# Patient Record
Sex: FEMALE | Race: BLACK OR AFRICAN AMERICAN | NOT HISPANIC OR LATINO | Employment: FULL TIME | URBAN - METROPOLITAN AREA
[De-identification: names, ages, dates, MRNs, and addresses within clinical notes are randomized per-mention and may not be internally consistent; named-entity substitution may affect disease eponyms.]

---

## 2017-09-11 ENCOUNTER — OFFICE VISIT (OUTPATIENT)
Dept: MEDICAL GROUP | Facility: PHYSICIAN GROUP | Age: 27
End: 2017-09-11
Payer: OTHER GOVERNMENT

## 2017-09-11 VITALS
BODY MASS INDEX: 21.71 KG/M2 | HEIGHT: 62 IN | HEART RATE: 74 BPM | SYSTOLIC BLOOD PRESSURE: 100 MMHG | DIASTOLIC BLOOD PRESSURE: 62 MMHG | OXYGEN SATURATION: 100 % | TEMPERATURE: 97.7 F | WEIGHT: 118 LBS

## 2017-09-11 DIAGNOSIS — G43.009 MIGRAINE WITHOUT AURA AND WITHOUT STATUS MIGRAINOSUS, NOT INTRACTABLE: ICD-10-CM

## 2017-09-11 DIAGNOSIS — G47.00 INSOMNIA, UNSPECIFIED TYPE: ICD-10-CM

## 2017-09-11 DIAGNOSIS — R11.0 NAUSEA: ICD-10-CM

## 2017-09-11 DIAGNOSIS — N64.4 PAIN OF LEFT BREAST: ICD-10-CM

## 2017-09-11 DIAGNOSIS — Z00.00 HEALTHCARE MAINTENANCE: ICD-10-CM

## 2017-09-11 DIAGNOSIS — F41.9 ANXIETY AND DEPRESSION: ICD-10-CM

## 2017-09-11 DIAGNOSIS — F32.A ANXIETY AND DEPRESSION: ICD-10-CM

## 2017-09-11 LAB
INT CON NEG: NEGATIVE
INT CON POS: POSITIVE
POC URINE PREGNANCY TEST: NEGATIVE

## 2017-09-11 PROCEDURE — 99204 OFFICE O/P NEW MOD 45 MIN: CPT | Performed by: FAMILY MEDICINE

## 2017-09-11 PROCEDURE — 81025 URINE PREGNANCY TEST: CPT | Performed by: FAMILY MEDICINE

## 2017-09-11 RX ORDER — TRAZODONE HYDROCHLORIDE 50 MG/1
50 TABLET ORAL NIGHTLY
COMMUNITY
End: 2018-01-03

## 2017-09-11 RX ORDER — LOTEPREDNOL ETABONATE 5 MG/ML
SUSPENSION/ DROPS OPHTHALMIC 4 TIMES DAILY
COMMUNITY
End: 2018-06-13

## 2017-09-11 RX ORDER — M-VIT,TX,IRON,MINS/CALC/FOLIC 27MG-0.4MG
1 TABLET ORAL DAILY
COMMUNITY
End: 2017-10-30

## 2017-09-11 RX ORDER — HYDROXYZINE 50 MG/1
50 TABLET, FILM COATED ORAL 3 TIMES DAILY PRN
Qty: 30 TAB | Refills: 0 | Status: SHIPPED | OUTPATIENT
Start: 2017-09-11 | End: 2018-06-13

## 2017-09-11 RX ORDER — ETONOGESTREL AND ETHINYL ESTRADIOL VAGINAL RING .015; .12 MG/D; MG/D
1 RING VAGINAL
COMMUNITY
End: 2017-11-29 | Stop reason: SDUPTHER

## 2017-09-11 RX ORDER — IBUPROFEN 600 MG/1
600 TABLET ORAL EVERY 6 HOURS PRN
COMMUNITY
End: 2017-09-11

## 2017-09-11 RX ORDER — SERTRALINE HYDROCHLORIDE 100 MG/1
100 TABLET, FILM COATED ORAL DAILY
COMMUNITY
End: 2018-01-03

## 2017-09-11 ASSESSMENT — PAIN SCALES - GENERAL: PAINLEVEL: NO PAIN

## 2017-09-11 ASSESSMENT — PATIENT HEALTH QUESTIONNAIRE - PHQ9: CLINICAL INTERPRETATION OF PHQ2 SCORE: 0

## 2017-09-12 NOTE — ASSESSMENT & PLAN NOTE
Patient has hx of migraine headaches generally well controlled.Not taking any medication.Infrequent headaches managed by ibuprofen or tylenol.

## 2017-09-12 NOTE — ASSESSMENT & PLAN NOTE
Patient has been experiencing nausea for the last few weeks.Deneis vomiting.She has been on Nuvaring, however missed about 4 weeks as it was not delivered on time.She did have a pregnancy test 1 week back which was negative.

## 2017-09-12 NOTE — ASSESSMENT & PLAN NOTE
Was diagnosed with anxiety and depression.Takes Zoloft 100 mg daily which seems to help somewhat.She still gets anxiety attacks where the first thing happens she starts breathing heavily.She tries to manage it by trying to relax herself but does not take any prn medication.Often times she has resorted to alcohol as well to calm herself down.

## 2017-09-12 NOTE — ASSESSMENT & PLAN NOTE
She is experiencing left breast pain for the last 6 months.She was seen by previous PCP and was reassured and advised to return for follow up breast exam but she never went back.Pain is sharp, comes in episodes but happening often.No lumps.She does not drink coffee.Has also changed bras but has not helped.Taken ibuprofen which seems to help temporarily.Deneis chest tightness, SOB.

## 2017-09-12 NOTE — ASSESSMENT & PLAN NOTE
Patient states she was diagnosed with chronic insomnia.She takes trazodone as needed to help with sleep.She tries to limit it's use.

## 2017-09-12 NOTE — PROGRESS NOTES
Abby LLANES is a 26 y.o. female here for establishing care, complaining of left breast pain.  Patient recently moved to the area from [place].    HPI:      Migraine without aura and without status migrainosus, not intractable  Patient has hx of migraine headaches generally well controlled.Not taking any medication.Infrequent headaches managed by ibuprofen or tylenol.    Anxiety and depression  Was diagnosed with anxiety and depression.Takes Zoloft 100 mg daily which seems to help somewhat.She still gets anxiety attacks where the first thing happens she starts breathing heavily.She tries to manage it by trying to relax herself but does not take any prn medication.Often times she has resorted to alcohol as well to calm herself down.    Insomnia disorder  Patient states she was diagnosed with chronic insomnia.She takes trazodone as needed to help with sleep.She tries to limit it's use.    Nausea  Patient has been experiencing nausea for the last few weeks.Deneis vomiting.She has been on Nuvaring, however missed about 4 weeks as it was not delivered on time.She did have a pregnancy test 1 week back which was negative.    Pain of left breast  She is experiencing left breast pain for the last 6 months.She was seen by previous PCP and was reassured and advised to return for follow up breast exam but she never went back.Pain is sharp, comes in episodes but happening often.No lumps.She does not drink coffee.Has also changed bras but has not helped.Taken ibuprofen which seems to help temporarily.Deneis chest tightness, SOB.    Current medicines (including changes today)  Current Outpatient Prescriptions   Medication Sig Dispense Refill   • sertraline (ZOLOFT) 100 MG Tab Take 100 mg by mouth every day.     • loteprednol etabonate (LOTEMAX) 0.5 % ophthalmic suspension Place  in both eyes 4 times a day.     • trazodone (DESYREL) 50 MG Tab Take 50 mg by mouth every evening.     • ethinyl estradiol-etonogestrel (NUVARING)  0.12-0.015 MG/24HR vaginal ring Insert 1 Each in vagina.     • therapeutic multivitamin-minerals (THERAGRAN-M) Tab Take 1 Tab by mouth every day.     • hydrOXYzine (ATARAX) 50 MG Tab Take 1 Tab by mouth 3 times a day as needed for Itching or Anxiety. 30 Tab 0     No current facility-administered medications for this visit.      She  has no past medical history on file.  She  has no past surgical history on file.  Social History   Substance Use Topics   • Smoking status: Never Smoker   • Smokeless tobacco: Never Used   • Alcohol use Yes      Comment: on the weekends     Social History     Social History Narrative   • No narrative on file     Family History   Problem Relation Age of Onset   • Cancer Maternal Aunt      Breast cancer   • Cancer Maternal Grandmother      Breast cancer     Family Status   Relation Status   • Mother Alive   • Father Alive   • Maternal Aunt Alive   • Maternal Grandmother Alive         ROS  Denies fever, chills, sweats, recent weight change  Skin: negative for rash, scaling, itching, pigmentation, hair or nail changes.  Eyes: negative for visual blurring, double vision, eye pain, floaters and discharge from eyes  Ears/Nose/Throat: negative for tinnitus, vertigo, bleeding gums, dental problem and hoarseness, frequent URI's, sinus trouble, persistent sore throat  Respiratory: negative for persistent cough, hemoptysis, dyspnea  Cardiovascular: negative for palpitations, irregular heart beat, chest pain, or peripheral edema.  Gastrointestinal: negative for poor appetite, dysphagia, nausea, heartburn or reflux, abdominal pain, constipation or diarrhea  Genitourinary: negative for nocturia, dysuria, frequency, hesitancy, incontinence  Musculoskeletal: negative for joint pain/swelling and muscle pain/ soreness  Neurologic: negative for headaches, involuntary movements or tremor,muscle weakness  Hematologic/Lymphatic/Immunologic: negative for anemia, unusual bruising, swollen glands  Endocrine:  "negative for temperature intolerance, polydipsia, polyuria, unintentional weight changes.          Objective:     Blood pressure 100/62, pulse 74, temperature 36.5 °C (97.7 °F), height 1.575 m (5' 2\"), weight 53.5 kg (118 lb), SpO2 100 %. Body mass index is 21.58 kg/m².  Physical Exam:    GEN: Alert and oriented,well appearing, no acute distress  SKIN: warm, dry to touch, no rashes or lesions in visible areas  PSYCH: mood and affect normal, judgement normal  EYES: Conjunctiva clear, lids normal,pupils equal round and reactive  ENMT: Normal external nose and ears,EACs normal appearing, TM pearly gray with normal light reflex bilaterally,nasal mucosa and turbinates normal appearing without erythema or edema, lips without lesions,good dentition,oropharynx clear  Neck : Trachea midline, no masses or swelling, no thyromegaly  LYMPHATIC : No cervical or supraclavicular lymphadenopathy  RESPIRATORY : Unlabored respiratory effort, no distress noted, clear to auscultation bilaterally, no wheeze, rhonchi, crackles  CARDIOVASCULAR: RRR, S1 S2 normal, no murmurs   Breast exam : Lumpy glandular tissue bilateral, no well defined lumps palpated, no nipple discharge, no induration, no tenderness  MUSCULOSKELETAL : Normal gait and stance, no obvious abnormalities   NEURO: No overt focal neurologic deficits,sensation intact      Assessment and Plan:   The following treatment plan was discussed    1. Migraine without aura and without status migrainosus, not intractable  Controlled.COntinue to monitor.    2. Anxiety and depression  Uncontrolled.Continue Zoloft.  Prescribed atarax to use prn for anxiety attacks  - hydrOXYzine (ATARAX) 50 MG Tab; Take 1 Tab by mouth 3 times a day as needed for Itching or Anxiety.  Dispense: 30 Tab; Refill: 0    3. Insomnia, unspecified type  Controlled.Continue Trazodone prn.    4. Nausea  - POCT PREGNANCY  negative    5. Pain of left breast  New problem.Most likely fibrocystic breast disease.Breast exam " normal.However given duration of symptoms, ultrasound ordered.  - US-BREAST LIMITED-LEFT; Future    6. Healthcare maintenance  - CBC WITH DIFFERENTIAL; Future  - COMP METABOLIC PANEL; Future  - TSH WITH REFLEX TO FT4; Future  - VITAMIN D,25 HYDROXY; Future      Followup: Return in about 3 months (around 12/11/2017).         Please note that this dictation was created using voice recognition software. I have made every reasonable attempt to correct obvious errors, but I expect that there are errors of grammar and possibly content that I did not discover before finalizing the note.

## 2017-09-29 ENCOUNTER — HOSPITAL ENCOUNTER (OUTPATIENT)
Dept: LAB | Facility: MEDICAL CENTER | Age: 27
End: 2017-09-29
Attending: FAMILY MEDICINE
Payer: OTHER GOVERNMENT

## 2017-09-29 ENCOUNTER — HOSPITAL ENCOUNTER (OUTPATIENT)
Dept: RADIOLOGY | Facility: MEDICAL CENTER | Age: 27
End: 2017-09-29
Attending: FAMILY MEDICINE
Payer: OTHER GOVERNMENT

## 2017-09-29 DIAGNOSIS — N64.4 BREAST PAIN: ICD-10-CM

## 2017-09-29 DIAGNOSIS — Z00.00 HEALTHCARE MAINTENANCE: ICD-10-CM

## 2017-09-29 DIAGNOSIS — N64.4 PAIN OF LEFT BREAST: ICD-10-CM

## 2017-09-29 LAB
25(OH)D3 SERPL-MCNC: 13 NG/ML (ref 30–100)
ALBUMIN SERPL BCP-MCNC: 3.9 G/DL (ref 3.2–4.9)
ALBUMIN/GLOB SERPL: 1.1 G/DL
ALP SERPL-CCNC: 35 U/L (ref 30–99)
ALT SERPL-CCNC: 13 U/L (ref 2–50)
ANION GAP SERPL CALC-SCNC: 9 MMOL/L (ref 0–11.9)
AST SERPL-CCNC: 15 U/L (ref 12–45)
BASOPHILS # BLD AUTO: 0.2 % (ref 0–1.8)
BASOPHILS # BLD: 0.01 K/UL (ref 0–0.12)
BILIRUB SERPL-MCNC: 0.6 MG/DL (ref 0.1–1.5)
BUN SERPL-MCNC: 8 MG/DL (ref 8–22)
CALCIUM SERPL-MCNC: 9.4 MG/DL (ref 8.5–10.5)
CHLORIDE SERPL-SCNC: 106 MMOL/L (ref 96–112)
CO2 SERPL-SCNC: 24 MMOL/L (ref 20–33)
CREAT SERPL-MCNC: 0.69 MG/DL (ref 0.5–1.4)
EOSINOPHIL # BLD AUTO: 0.03 K/UL (ref 0–0.51)
EOSINOPHIL NFR BLD: 0.6 % (ref 0–6.9)
ERYTHROCYTE [DISTWIDTH] IN BLOOD BY AUTOMATED COUNT: 43.5 FL (ref 35.9–50)
GFR SERPL CREATININE-BSD FRML MDRD: >60 ML/MIN/1.73 M 2
GLOBULIN SER CALC-MCNC: 3.4 G/DL (ref 1.9–3.5)
GLUCOSE SERPL-MCNC: 71 MG/DL (ref 65–99)
HCT VFR BLD AUTO: 40.1 % (ref 37–47)
HGB BLD-MCNC: 13.5 G/DL (ref 12–16)
IMM GRANULOCYTES # BLD AUTO: 0.01 K/UL (ref 0–0.11)
IMM GRANULOCYTES NFR BLD AUTO: 0.2 % (ref 0–0.9)
LYMPHOCYTES # BLD AUTO: 3.21 K/UL (ref 1–4.8)
LYMPHOCYTES NFR BLD: 66.7 % (ref 22–41)
MCH RBC QN AUTO: 30.5 PG (ref 27–33)
MCHC RBC AUTO-ENTMCNC: 33.7 G/DL (ref 33.6–35)
MCV RBC AUTO: 90.7 FL (ref 81.4–97.8)
MONOCYTES # BLD AUTO: 0.41 K/UL (ref 0–0.85)
MONOCYTES NFR BLD AUTO: 8.5 % (ref 0–13.4)
NEUTROPHILS # BLD AUTO: 1.14 K/UL (ref 2–7.15)
NEUTROPHILS NFR BLD: 23.8 % (ref 44–72)
NRBC # BLD AUTO: 0 K/UL
NRBC BLD AUTO-RTO: 0 /100 WBC
PLATELET # BLD AUTO: 248 K/UL (ref 164–446)
PMV BLD AUTO: 10.3 FL (ref 9–12.9)
POTASSIUM SERPL-SCNC: 3.9 MMOL/L (ref 3.6–5.5)
PROT SERPL-MCNC: 7.3 G/DL (ref 6–8.2)
RBC # BLD AUTO: 4.42 M/UL (ref 4.2–5.4)
SODIUM SERPL-SCNC: 139 MMOL/L (ref 135–145)
TSH SERPL DL<=0.005 MIU/L-ACNC: 1.14 UIU/ML (ref 0.3–3.7)
WBC # BLD AUTO: 4.8 K/UL (ref 4.8–10.8)

## 2017-09-29 PROCEDURE — 82306 VITAMIN D 25 HYDROXY: CPT

## 2017-09-29 PROCEDURE — 85025 COMPLETE CBC W/AUTO DIFF WBC: CPT

## 2017-09-29 PROCEDURE — 36415 COLL VENOUS BLD VENIPUNCTURE: CPT

## 2017-09-29 PROCEDURE — 76642 ULTRASOUND BREAST LIMITED: CPT | Mod: LT

## 2017-09-29 PROCEDURE — 84443 ASSAY THYROID STIM HORMONE: CPT

## 2017-09-29 PROCEDURE — 80053 COMPREHEN METABOLIC PANEL: CPT

## 2017-10-03 DIAGNOSIS — D70.9 NEUTROPENIA, UNSPECIFIED TYPE (HCC): ICD-10-CM

## 2017-10-05 ENCOUNTER — TELEPHONE (OUTPATIENT)
Dept: MEDICAL GROUP | Facility: PHYSICIAN GROUP | Age: 27
End: 2017-10-05

## 2017-10-06 NOTE — TELEPHONE ENCOUNTER
----- Message from Albert Garcia M.D. sent at 10/3/2017  4:46 PM PDT -----  Please notify patient of her lab results :    No major abnormalities.    Vitamin D is low.Needs to take Vitamin D 5000 units daily.    One of her blood cell counts (neutrophils - that fight infections in our body) is mildly low.This may be transient and does not point to any specific abnormality or disease.We will however need to monitor it. There will recheck her blood counts in 4 weeks. I will place order and it should be done when she is in general good health (not during a illness like common cold, flu, fever etc.)    Albert Garcia M.D.

## 2017-10-30 ENCOUNTER — HOSPITAL ENCOUNTER (OUTPATIENT)
Facility: MEDICAL CENTER | Age: 27
End: 2017-10-30
Attending: FAMILY MEDICINE
Payer: OTHER GOVERNMENT

## 2017-10-30 ENCOUNTER — OFFICE VISIT (OUTPATIENT)
Dept: MEDICAL GROUP | Facility: PHYSICIAN GROUP | Age: 27
End: 2017-10-30
Payer: OTHER GOVERNMENT

## 2017-10-30 VITALS
BODY MASS INDEX: 22.26 KG/M2 | DIASTOLIC BLOOD PRESSURE: 68 MMHG | WEIGHT: 121 LBS | SYSTOLIC BLOOD PRESSURE: 96 MMHG | HEIGHT: 62 IN | TEMPERATURE: 97 F | HEART RATE: 72 BPM | OXYGEN SATURATION: 97 %

## 2017-10-30 DIAGNOSIS — R35.0 INCREASED URINARY FREQUENCY: ICD-10-CM

## 2017-10-30 DIAGNOSIS — Z23 NEED FOR VACCINATION: ICD-10-CM

## 2017-10-30 PROBLEM — N34.3 DYSURIA-FREQUENCY SYNDROME: Status: ACTIVE | Noted: 2017-10-30

## 2017-10-30 LAB
APPEARANCE UR: CLEAR
BILIRUB UR STRIP-MCNC: NORMAL MG/DL
CANDIDA DNA VAG QL PROBE+SIG AMP: NEGATIVE
COLOR UR AUTO: YELLOW
G VAGINALIS DNA VAG QL PROBE+SIG AMP: POSITIVE
GLUCOSE UR STRIP.AUTO-MCNC: NORMAL MG/DL
KETONES UR STRIP.AUTO-MCNC: NORMAL MG/DL
LEUKOCYTE ESTERASE UR QL STRIP.AUTO: NORMAL
NITRITE UR QL STRIP.AUTO: NORMAL
PH UR STRIP.AUTO: 7 [PH] (ref 5–8)
PROT UR QL STRIP: NORMAL MG/DL
RBC UR QL AUTO: NORMAL
SP GR UR STRIP.AUTO: 1.03
T VAGINALIS DNA VAG QL PROBE+SIG AMP: NEGATIVE
UROBILINOGEN UR STRIP-MCNC: NORMAL MG/DL

## 2017-10-30 PROCEDURE — 87591 N.GONORRHOEAE DNA AMP PROB: CPT

## 2017-10-30 PROCEDURE — 87660 TRICHOMONAS VAGIN DIR PROBE: CPT

## 2017-10-30 PROCEDURE — 81002 URINALYSIS NONAUTO W/O SCOPE: CPT | Performed by: FAMILY MEDICINE

## 2017-10-30 PROCEDURE — 87510 GARDNER VAG DNA DIR PROBE: CPT

## 2017-10-30 PROCEDURE — 87086 URINE CULTURE/COLONY COUNT: CPT

## 2017-10-30 PROCEDURE — 90471 IMMUNIZATION ADMIN: CPT | Performed by: FAMILY MEDICINE

## 2017-10-30 PROCEDURE — 90686 IIV4 VACC NO PRSV 0.5 ML IM: CPT | Performed by: FAMILY MEDICINE

## 2017-10-30 PROCEDURE — 99214 OFFICE O/P EST MOD 30 MIN: CPT | Performed by: FAMILY MEDICINE

## 2017-10-30 PROCEDURE — 87491 CHLMYD TRACH DNA AMP PROBE: CPT

## 2017-10-30 PROCEDURE — 87480 CANDIDA DNA DIR PROBE: CPT

## 2017-10-30 ASSESSMENT — PAIN SCALES - GENERAL: PAINLEVEL: 2=MINIMAL-SLIGHT

## 2017-10-30 NOTE — PROGRESS NOTES
"Subjective:   Abby LLANES is a 26 y.o. female here today for Urinary frequency     HPI :    New complaint  Urinary frequency X 1 week  She does have good urine flow, however there are times when she feels like she needs to urinate but does not have much urine volume production. During this times it also burns when she urinates. She also reports some lower abdominal pain. Denies nausea, vomiting, back pain, fever, chills. She also admits to not drinking water because she does not like the taste of it. Reports some increase in vaginal discharge, whitish in color, no itching. She is currently sexually active. Denies history of sexually-transmitted infections. She reports having had a Pap done about a year back when she was at [facility] which is normal. We do not have records. Not taking any over-the-counter medications.      Current medicines (including changes today)  Current Outpatient Prescriptions   Medication Sig Dispense Refill   • loteprednol etabonate (LOTEMAX) 0.5 % ophthalmic suspension Place  in both eyes 4 times a day.     • ethinyl estradiol-etonogestrel (NUVARING) 0.12-0.015 MG/24HR vaginal ring Insert 1 Each in vagina.     • hydrOXYzine (ATARAX) 50 MG Tab Take 1 Tab by mouth 3 times a day as needed for Itching or Anxiety. 30 Tab 0   • sertraline (ZOLOFT) 100 MG Tab Take 100 mg by mouth every day.     • trazodone (DESYREL) 50 MG Tab Take 50 mg by mouth every evening.       No current facility-administered medications for this visit.      She  has a past medical history of Anxiety and depression and Migraine.    ROS   No chest pain, no shortness of breath, no abdominal pain  See history of present illness     Objective:     Blood pressure (!) 96/68, pulse 72, temperature 36.1 °C (97 °F), height 1.575 m (5' 2\"), weight 54.9 kg (121 lb), SpO2 97 %. Body mass index is 22.13 kg/m².     PHYSICAL EXAM     GEN: Alert and oriented,well appearing, no acute distress  SKIN: warm, dry to touch, no rashes or " lesions in visible areas  PSYCH: mood and affect normal, judgement normal  RESPIRATORY : Unlabored respiratory effort, no distress noted, clear to auscultation bilaterally, no wheeze, rhonchi, crackles  CARDIOVASCULAR: RRR, S1 S2 normal, no murmurs, no edema of the extremities  GI: Soft, Non tender, No rebound or guarding, no hepatosplenomegaly,suprapubic tenderness +, no CVA tenderness   MUSCULOSKELETAL : Normal gait and stance, no obvious abnormalities   NEURO: No overt focal neurologic deficits,sensation intact        Assessment and Plan:   The following treatment plan was discussed    1. Increased urinary frequency  New problem  UA in clinic : leuks, nitrite, blood neg  Will send for Urine cx.Also collected wet prep to R/O vaginitis and GC/chlamydia  Advised to drink 8-10 glasses of water daily.  Will follow-up results.  - POCT Urinalysis  - URINE CULTURE(NEW); Future  - VAGINAL PATHOGENS DNA PANEL; Future  - CHLAMYDIA/GC PCR URINE OR SWAB; Future      Followup: Return if symptoms worsen or fail to improve.         Please note that this dictation was created using voice recognition software. I have made every reasonable attempt to correct obvious errors, but I expect that there are errors of grammar and possibly content that I did not discover before finalizing the note.

## 2017-10-31 LAB
C TRACH DNA SPEC QL NAA+PROBE: NEGATIVE
N GONORRHOEA DNA SPEC QL NAA+PROBE: NEGATIVE
SPECIMEN SOURCE: NORMAL

## 2017-11-01 LAB
BACTERIA UR CULT: ABNORMAL
SIGNIFICANT IND 70042: ABNORMAL
SITE SITE: ABNORMAL
SOURCE SOURCE: ABNORMAL

## 2017-11-03 ENCOUNTER — TELEPHONE (OUTPATIENT)
Dept: MEDICAL GROUP | Facility: PHYSICIAN GROUP | Age: 27
End: 2017-11-03

## 2017-11-05 RX ORDER — METRONIDAZOLE 500 MG/1
500 TABLET ORAL 2 TIMES DAILY
Qty: 14 TAB | Refills: 0 | Status: SHIPPED | OUTPATIENT
Start: 2017-11-05 | End: 2017-11-12

## 2017-11-29 ENCOUNTER — HOSPITAL ENCOUNTER (OUTPATIENT)
Dept: LAB | Facility: MEDICAL CENTER | Age: 27
End: 2017-11-29
Attending: NURSE PRACTITIONER
Payer: OTHER GOVERNMENT

## 2017-11-29 ENCOUNTER — OFFICE VISIT (OUTPATIENT)
Dept: MEDICAL GROUP | Facility: PHYSICIAN GROUP | Age: 27
End: 2017-11-29
Payer: OTHER GOVERNMENT

## 2017-11-29 VITALS
TEMPERATURE: 97.4 F | HEIGHT: 62 IN | BODY MASS INDEX: 22.08 KG/M2 | SYSTOLIC BLOOD PRESSURE: 108 MMHG | WEIGHT: 120 LBS | HEART RATE: 74 BPM | OXYGEN SATURATION: 96 % | DIASTOLIC BLOOD PRESSURE: 64 MMHG | RESPIRATION RATE: 12 BRPM

## 2017-11-29 DIAGNOSIS — R20.0 BILATERAL NUMBNESS AND TINGLING OF ARMS AND LEGS: ICD-10-CM

## 2017-11-29 DIAGNOSIS — R20.2 BILATERAL NUMBNESS AND TINGLING OF ARMS AND LEGS: ICD-10-CM

## 2017-11-29 DIAGNOSIS — M79.18 INTERCOSTAL MUSCLE PAIN: ICD-10-CM

## 2017-11-29 LAB
ERYTHROCYTE [SEDIMENTATION RATE] IN BLOOD BY WESTERGREN METHOD: 7 MM/HOUR (ref 0–20)
VIT B12 SERPL-MCNC: 333 PG/ML (ref 211–911)

## 2017-11-29 PROCEDURE — 82607 VITAMIN B-12: CPT

## 2017-11-29 PROCEDURE — 84425 ASSAY OF VITAMIN B-1: CPT

## 2017-11-29 PROCEDURE — 86038 ANTINUCLEAR ANTIBODIES: CPT

## 2017-11-29 PROCEDURE — 36415 COLL VENOUS BLD VENIPUNCTURE: CPT

## 2017-11-29 PROCEDURE — 85652 RBC SED RATE AUTOMATED: CPT

## 2017-11-29 PROCEDURE — 99214 OFFICE O/P EST MOD 30 MIN: CPT | Performed by: NURSE PRACTITIONER

## 2017-11-29 PROCEDURE — 86235 NUCLEAR ANTIGEN ANTIBODY: CPT

## 2017-11-29 PROCEDURE — 86225 DNA ANTIBODY NATIVE: CPT

## 2017-11-29 RX ORDER — NAPROXEN 375 MG/1
TABLET ORAL
Refills: 0 | COMMUNITY
Start: 2017-09-02 | End: 2017-11-29

## 2017-11-29 RX ORDER — ETONOGESTREL AND ETHINYL ESTRADIOL VAGINAL RING .015; .12 MG/D; MG/D
RING VAGINAL
Qty: 3 EACH | Refills: 3 | Status: SHIPPED | OUTPATIENT
Start: 2017-11-29 | End: 2018-06-13

## 2017-11-29 RX ORDER — TIZANIDINE 4 MG/1
4 TABLET ORAL EVERY 12 HOURS PRN
Qty: 30 TAB | Refills: 0 | Status: SHIPPED | OUTPATIENT
Start: 2017-11-29 | End: 2017-11-29 | Stop reason: SDUPTHER

## 2017-11-29 RX ORDER — ONDANSETRON 4 MG/1
TABLET, ORALLY DISINTEGRATING ORAL
Refills: 0 | COMMUNITY
Start: 2017-09-02 | End: 2017-11-29

## 2017-11-29 RX ORDER — METHOCARBAMOL 500 MG/1
500 TABLET, FILM COATED ORAL
COMMUNITY
Start: 2016-06-20 | End: 2017-11-29

## 2017-11-29 RX ORDER — NITROFURANTOIN 25; 75 MG/1; MG/1
CAPSULE ORAL
Refills: 0 | COMMUNITY
Start: 2017-09-02 | End: 2017-11-29

## 2017-11-29 RX ORDER — IBUPROFEN 600 MG/1
600 TABLET ORAL
COMMUNITY
Start: 2016-06-20 | End: 2017-11-29

## 2017-11-29 RX ORDER — FAMOTIDINE 20 MG/1
20 TABLET, FILM COATED ORAL 2 TIMES DAILY
Qty: 10 TAB | Refills: 0 | Status: SHIPPED | OUTPATIENT
Start: 2017-11-29 | End: 2018-06-13

## 2017-11-29 RX ORDER — TRAMADOL HYDROCHLORIDE 50 MG/1
50 TABLET ORAL
COMMUNITY
Start: 2016-06-20 | End: 2018-01-03

## 2017-11-29 RX ORDER — NAPROXEN 500 MG/1
500 TABLET ORAL 2 TIMES DAILY WITH MEALS
Qty: 10 TAB | Refills: 0 | Status: SHIPPED | OUTPATIENT
Start: 2017-11-29 | End: 2018-06-13

## 2017-11-29 NOTE — PROGRESS NOTES
Subjective:     Chief Complaint   Patient presents with   • Tingling     bilateral hands and feet x 2 weeks   • LUQ Pain     x 2 weeks   • Medication Refill     birth control to be sent to mail order pharmacy       HPI  Abby LLANES is a 26 y.o. Female, patient of ENRIQUE Lora, here today for LUQ pain and paresthesias. To note, she does have history of anxiety depression currently managed with sertraline    LUQ pain  New problem over the past two weeks. Reports pain in LUQ of abdomen just under breast. Breathing in deeply or pushing or pulling can aggravate the pain. No treatments tried.    Tingling of hands and feet  This is a new problem with tingling of all extremities. Started two weeks ago. It comes and goes occasionally throughout the day, occurring sporadically lasting only about a minute each time. If she is holding something, such as talking on the phone, it can bring it on. Laying on her side can bring it on as well. No weakness of legs or arms. The hands are aching, worsened with making fist. No vision changes, dizziness, or headache. Denies any joint pain. +fatigue over the past few weeks. No recent viral illness. This has never happened in the past. Denies any known history of autoimmune disorder or multiple sclerosis. She does not use a computer at work, but does a lot of lifting, grabbing, and pushing.  Just had labs drawn in September that revealed normal blood count. Vitamin D was low at 13.       Diagnoses of Bilateral numbness and tingling of arms and legs and Intercostal muscle pain were pertinent to this visit.    Allergies: Patient has no known allergies.  Current medicines (including changes today)  Current Outpatient Prescriptions   Medication Sig Dispense Refill   • tramadol (ULTRAM) 50 MG Tab Take 50 mg by mouth.     • ethinyl estradiol-etonogestrel (NUVARING) 0.12-0.015 MG/24HR vaginal ring Insert into vagina and leave in place for 3 weeks. Remove on week 4. 3 Each 3   •  "naproxen (NAPROSYN) 500 MG Tab Take 1 Tab by mouth 2 times a day, with meals. 10 Tab 0   • famotidine (PEPCID) 20 MG Tab Take 1 Tab by mouth 2 times a day. 10 Tab 0   • sertraline (ZOLOFT) 100 MG Tab Take 100 mg by mouth every day.     • trazodone (DESYREL) 50 MG Tab Take 50 mg by mouth every evening.     • tizanidine (ZANAFLEX) 4 MG Tab TAKE 1 TABLET BY MOUTH EVERY 12 HOURS AS NEEDED FOR MUSCLE STIFFNESS 60 Tab 0   • loteprednol etabonate (LOTEMAX) 0.5 % ophthalmic suspension Place  in both eyes 4 times a day.     • hydrOXYzine (ATARAX) 50 MG Tab Take 1 Tab by mouth 3 times a day as needed for Itching or Anxiety. 30 Tab 0     No current facility-administered medications for this visit.        She  has a past medical history of Anxiety and depression and Migraine.      ROS  As stated in HPI and additionally  Constitutional: +fatigue. +insomnia. No F/C, night sweats, fatigue, weight gain or loss  Head/Neck: No headache, dizziness, neck pain, or carmen enlargement  Eyes: No change in vision  Lungs: No cough, sob, dyspnea  Cardiac: No chest pain, palpitations, syncope   Neuro: No change in gait, dizziness, or weakness       Objective:     Blood pressure 108/64, pulse 74, temperature 36.3 °C (97.4 °F), resp. rate 12, height 1.575 m (5' 2\"), weight 54.4 kg (120 lb), SpO2 96 %. Body mass index is 21.95 kg/m².  Physical Exam:  General: Alert, oriented, in no acute distress.  Eye contact is good, speech goal directed, affect calm  Neuro: CNs grossly intact. Gait steady.  equal and strong. Strength all 4 extremities 5/5. Light touch sensation decreased left forearm, pin prick increased RUE   HEENT: conjunctiva non-injected, sclera non-icteric, EOMs intact. PERRL. No lid edema or eye drainage.   Gross hearing intact.  Neck with no visible deformity, edema, or erythema. No cervical lymphadenopathy. +TTP of bilateral upper trapezius and rear deltoid muscles.  No tenderness or spasm of paraspinous muscles. No spinous process " tenderness. No trigger point at occiput. FAROM without pain through forward flexion, extension, left and right rotation, or ear to shoulder. Right ear to shoulder aggravates tingling in LLE, and shoulder abduction aggravates tingling in bilateral hands.  equal and strong bilat. FAROM of bilat shoulder and elbow. BUE strength 5/5. CN XI intact.   Lungs: unlabored. clear to auscultation bilaterally with good excursion.  CV: regular rate and rhythm. No murmurs  Ext: no edema, normal color and temperature.   Skin: No rashes or lesions in visible areas      Assessment and Plan:   Assessment/Plan:  1. Bilateral numbness and tingling of arms and legs  New problem. Acute. No red flag symptoms of any family history that would indicate need for further workup beyond initial screening for autoimmune study, inflammatory marker, and vitamin deficiency. Monitor for results. Discussed that symptoms could be secondary to muscle tension and stiffness as range of motion of shoulders and neck aggravate symptoms. Discussed that 5 days of naproxen and a muscle relaxer may help to alleviate her symptoms as well. Encouraged light stretching, warm Epsom salt baths, and massage to upper trapezius and neck muscles.  - VITAMIN B12; Future  - VITAMIN B1; Future  - WESTERGREN SED RATE; Future  - MARYAM ANTIBODY WITH REFLEX; Future    2. Intercostal muscle pain  Recommended naproxen 500 mg twice daily with food for 5 days. Also prescribed Pepcid to take twice daily with food as ibuprofen has caused her stomach upset in the past.       Follow up:  Return in about 2 weeks (around 12/13/2017), or if not improving .    Educated in proper administration of medication(s) ordered today including safety, possible SE, risks, benefits, rationale and alternatives to therapy.   Supportive care, differential diagnoses, and indications for immediate follow-up discussed with patient.    Pathogenesis of diagnosis discussed including typical length and natural  progression.    Instructed to return to clinic or nearest emergency department for any change in condition, further concerns, or worsening of symptoms.  Patient states understanding of the plan of care and discharge instructions.      Please note that this dictation was created using voice recognition software. I have made every reasonable attempt to correct obvious errors, but I expect that there are errors of grammar and possibly content that I did not discover before finalizing the note.    Followup: Return in about 2 weeks (around 12/13/2017), or if not improving . sooner should new symptoms or problems arise.

## 2017-12-01 LAB — NUCLEAR IGG SER QL IA: NORMAL

## 2017-12-02 LAB — VIT B1 BLD-MCNC: 116 NMOL/L (ref 70–180)

## 2018-01-03 ENCOUNTER — OFFICE VISIT (OUTPATIENT)
Dept: MEDICAL GROUP | Facility: PHYSICIAN GROUP | Age: 28
End: 2018-01-03
Payer: OTHER GOVERNMENT

## 2018-01-03 VITALS
HEART RATE: 75 BPM | SYSTOLIC BLOOD PRESSURE: 104 MMHG | BODY MASS INDEX: 22.08 KG/M2 | WEIGHT: 120 LBS | OXYGEN SATURATION: 97 % | DIASTOLIC BLOOD PRESSURE: 62 MMHG | HEIGHT: 62 IN | TEMPERATURE: 97.5 F

## 2018-01-03 DIAGNOSIS — J02.9 ACUTE VIRAL PHARYNGITIS: ICD-10-CM

## 2018-01-03 PROCEDURE — 99213 OFFICE O/P EST LOW 20 MIN: CPT | Performed by: FAMILY MEDICINE

## 2018-01-03 RX ORDER — FLUTICASONE PROPIONATE 50 MCG
1 SPRAY, SUSPENSION (ML) NASAL DAILY
Qty: 16 G | Refills: 1 | Status: SHIPPED | OUTPATIENT
Start: 2018-01-03 | End: 2018-06-13

## 2018-01-03 ASSESSMENT — PAIN SCALES - GENERAL: PAINLEVEL: 7=MODERATE-SEVERE PAIN

## 2018-01-04 NOTE — PROGRESS NOTES
"Subjective:   Abby LLANES is a 27 y.o. female here today for sore throat    HPI :     Sore throat for 2 days associated with right ear ache and nasal stuffiness.No fever, chills, SOB.  associated with mild dry cough.  Not taking any OTC medications.      Current medicines (including changes today)  Current Outpatient Prescriptions   Medication Sig Dispense Refill   • fluticasone (FLONASE) 50 MCG/ACT nasal spray Spray 1 Spray in nose every day. 16 g 1   • ethinyl estradiol-etonogestrel (NUVARING) 0.12-0.015 MG/24HR vaginal ring Insert into vagina and leave in place for 3 weeks. Remove on week 4. 3 Each 3   • famotidine (PEPCID) 20 MG Tab Take 1 Tab by mouth 2 times a day. 10 Tab 0   • loteprednol etabonate (LOTEMAX) 0.5 % ophthalmic suspension Place  in both eyes 4 times a day.     • hydrOXYzine (ATARAX) 50 MG Tab Take 1 Tab by mouth 3 times a day as needed for Itching or Anxiety. 30 Tab 0   • naproxen (NAPROSYN) 500 MG Tab Take 1 Tab by mouth 2 times a day, with meals. 10 Tab 0   • tizanidine (ZANAFLEX) 4 MG Tab TAKE 1 TABLET BY MOUTH EVERY 12 HOURS AS NEEDED FOR MUSCLE STIFFNESS 60 Tab 0     No current facility-administered medications for this visit.      She  has a past medical history of Anxiety and depression and Migraine.    ROS   No chest pain, no shortness of breath, no abdominal pain       Objective:     Blood pressure 104/62, pulse 75, temperature 36.4 °C (97.5 °F), height 1.575 m (5' 2\"), weight 54.4 kg (120 lb), SpO2 97 %. Body mass index is 21.95 kg/m².     PHYSICAL EXAM     GEN: Alert and oriented,well appearing, no acute distress  SKIN: warm, dry to touch, no rashes or lesions in visible areas  PSYCH: mood and affect normal, judgement normal  EYES: Conjunctiva clear, lids normal,pupils equal round and reactive  ENMT: Normal external nose and ears,EACs normal appearing, TM pearly gray with normal light reflex bilaterally,nasal mucosa and turbinates normal appearing without erythema or edema, lips " without lesions,good dentition,oropharynx mild erythematous, left tonsil slightly prominent, no exudates  Neck : Trachea midline, no masses or swelling, no thyromegaly  LYMPHATIC : No cervical or supraclavicular lymphadenopathy  RESPIRATORY : Unlabored respiratory effort, no distress noted, clear to auscultation bilaterally, no wheeze, rhonchi, crackles  CARDIOVASCULAR: RRR, S1 S2 normal, no murmurs  MUSCULOSKELETAL : Normal gait and stance, no obvious abnormalities   NEURO: No overt focal neurologic deficits,sensation intact        Assessment and Plan:   The following treatment plan was discussed    1. Acute viral pharyngitis  New problem.centor score 0.  Viral etiology.No need for abx.  Supportive t/t, saline gargle, tylenol prn, cepacol  Flonase nasal spray      Followup: Return if symptoms worsen or fail to improve.         Please note that this dictation was created using voice recognition software. I have made every reasonable attempt to correct obvious errors, but I expect that there are errors of grammar and possibly content that I did not discover before finalizing the note.

## 2018-02-11 ENCOUNTER — HOSPITAL ENCOUNTER (OUTPATIENT)
Facility: MEDICAL CENTER | Age: 28
End: 2018-02-11
Attending: PHYSICIAN ASSISTANT
Payer: OTHER GOVERNMENT

## 2018-02-11 ENCOUNTER — OFFICE VISIT (OUTPATIENT)
Dept: URGENT CARE | Facility: PHYSICIAN GROUP | Age: 28
End: 2018-02-11
Payer: OTHER GOVERNMENT

## 2018-02-11 VITALS
RESPIRATION RATE: 14 BRPM | HEART RATE: 67 BPM | WEIGHT: 122.58 LBS | BODY MASS INDEX: 22.42 KG/M2 | OXYGEN SATURATION: 100 % | SYSTOLIC BLOOD PRESSURE: 126 MMHG | TEMPERATURE: 98.5 F | DIASTOLIC BLOOD PRESSURE: 66 MMHG

## 2018-02-11 DIAGNOSIS — R10.30 LOWER ABDOMINAL PAIN: ICD-10-CM

## 2018-02-11 DIAGNOSIS — R11.0 NAUSEA: ICD-10-CM

## 2018-02-11 LAB
APPEARANCE UR: CLEAR
BILIRUB UR STRIP-MCNC: NEGATIVE MG/DL
COLOR UR AUTO: YELLOW
GLUCOSE UR STRIP.AUTO-MCNC: NEGATIVE MG/DL
INT CON NEG: NORMAL
INT CON POS: NORMAL
KETONES UR STRIP.AUTO-MCNC: NEGATIVE MG/DL
LEUKOCYTE ESTERASE UR QL STRIP.AUTO: NORMAL
NITRITE UR QL STRIP.AUTO: NEGATIVE
PH UR STRIP.AUTO: 6 [PH] (ref 5–8)
POC URINE PREGNANCY TEST: NEGATIVE
PROT UR QL STRIP: NEGATIVE MG/DL
RBC UR QL AUTO: NEGATIVE
SP GR UR STRIP.AUTO: 1.01
UROBILINOGEN UR STRIP-MCNC: NEGATIVE MG/DL

## 2018-02-11 PROCEDURE — 87077 CULTURE AEROBIC IDENTIFY: CPT

## 2018-02-11 PROCEDURE — 87086 URINE CULTURE/COLONY COUNT: CPT

## 2018-02-11 PROCEDURE — 81025 URINE PREGNANCY TEST: CPT | Performed by: PHYSICIAN ASSISTANT

## 2018-02-11 PROCEDURE — 99214 OFFICE O/P EST MOD 30 MIN: CPT | Performed by: PHYSICIAN ASSISTANT

## 2018-02-11 PROCEDURE — 81002 URINALYSIS NONAUTO W/O SCOPE: CPT | Performed by: PHYSICIAN ASSISTANT

## 2018-02-11 RX ORDER — ONDANSETRON 4 MG/1
4 TABLET, ORALLY DISINTEGRATING ORAL EVERY 6 HOURS PRN
Qty: 10 TAB | Refills: 0 | Status: SHIPPED | OUTPATIENT
Start: 2018-02-11 | End: 2018-06-13

## 2018-02-11 RX ORDER — ONDANSETRON 4 MG/1
4 TABLET, ORALLY DISINTEGRATING ORAL ONCE
Status: COMPLETED | OUTPATIENT
Start: 2018-02-11 | End: 2018-02-11

## 2018-02-11 RX ADMIN — ONDANSETRON 4 MG: 4 TABLET, ORALLY DISINTEGRATING ORAL at 15:41

## 2018-02-11 ASSESSMENT — ENCOUNTER SYMPTOMS: ABDOMINAL PAIN: 1

## 2018-02-11 NOTE — PROGRESS NOTES
Subjective:      Abby LLANES is a 27 y.o. female who presents with No chief complaint on file.            HPI  No chief complaint on file.      HPI:  Abby LLANES is a 27 y.o. F who presents with male  with abd pain lower x 1 day.  Started yesterday and progressivley worsen.  Worsening nausea.  Also with HA.  No sore throat.  Low back pain midline. Has not tried any medications.  Patient denies HA, SOB, chest pain, palpitations, fever, chills, or diarrhea.      Past Medical History:   Diagnosis Date   • Anxiety and depression    • Migraine        No past surgical history on file.    Family History   Problem Relation Age of Onset   • Cancer Maternal Aunt      Breast cancer   • Cancer Maternal Grandmother      Breast cancer   • Arthritis Maternal Grandmother      No pertinent family history.    Social History     Social History   • Marital status:      Spouse name: N/A   • Number of children: N/A   • Years of education: N/A     Occupational History   • Not on file.     Social History Main Topics   • Smoking status: Never Smoker   • Smokeless tobacco: Never Used   • Alcohol use Yes      Comment: on the weekends   • Drug use: No   • Sexual activity: Yes     Partners: Male     Other Topics Concern   • Not on file     Social History Narrative   • No narrative on file         Current Outpatient Prescriptions:   •  fluticasone, 1 Spray, Nasal, DAILY  •  ethinyl estradiol-etonogestrel, Insert into vagina and leave in place for 3 weeks. Remove on week 4., Taking  •  naproxen, 500 mg, Oral, BID WITH MEALS, PRN  •  famotidine, 20 mg, Oral, BID, Taking  •  tizanidine, TAKE 1 TABLET BY MOUTH EVERY 12 HOURS AS NEEDED FOR MUSCLE STIFFNESS, PRN  •  loteprednol etabonate, Place  in both eyes 4 times a day., Taking  •  hydrOXYzine HCl, 50 mg, Oral, TID PRN, Taking    No Known Allergies     Review of Systems   Gastrointestinal: Positive for abdominal pain.   Genitourinary: Negative.           Objective:     BP  126/66   Pulse 67   Temp 36.9 °C (98.5 °F)   Resp 14   Wt 55.6 kg (122 lb 9.2 oz)   SpO2 100%   BMI 22.42 kg/m²      Physical Exam       Nursing note and vitals reviewed.    Constitutional:  Appropriately groomed, pleasant affect, and in no acute distress.    Head: normocephalic and atraumatic.    Eye:   PERRLA, EOM's full, sclera white, no scleral icterus, conjunctiva not erythematous, and medial canthus without exudate bilaterally.    Ears:  Hearing grossly intact to voice.    Throat:  Oropharynx not erythematous, with no enlargement of the palatine tonsils bilaterally with no exudates.    Posterior oropharynx with no post nasal drainage present.  Soft palate rises symmetrically bilaterally and uvula midline.  Neck supple, with mild proximal anterior cervical chain lymphadenopathy that is soft and mobile to palpation.  Thyroid non-palpable.  No supraclavicular lymphadenopathy.    Lungs:  Lungs with normal respiratory excursion and effort.    Heart:  RRR, without murmurs, rubs, or gallops.  Carotid arteries without bruits bilaterally.  Radial and dorsalis pedis pulses 2+ bilaterally    Abdomen:  Flat.without striations, ecchymosis, or lesions.  Bowel sounds present all 4Qs.  Normotympanic to percussion all 4Qs, hyperactive.  Generalized lower abdominal TTP.  No masses to palpation.  No hepatosplenomegaly, no TTP at McBurney's point, negative Francis's sign, no rebound tenderness, and no guarding.  No CVA tenderness bilaterally.    MSK:  Gait and station wnl, non antalgic.    Derm:  No rashes or lesions with good turgor pressure.     Psychiatric:  Normal judgement, mood and affect.      Assessment/Plan:     1. Abdominal pain, unspecified abdominal location  POCT Urinalysis    POCT PREGNANCY    ondansetron (ZOFRAN ODT) dispertab 4 mg   2. Nausea  ondansetron (ZOFRAN ODT) dispertab 4 mg      Patient presents with abdominal pain since yesterday with unclear etiology.  Lower abdominal pain with no guarding.  UA  positive for leuk esterase, no blood.  Sent urine for culture.  Post zofran patient had improvement in nausea. Low suspicion for influenza.  Suspect viral syndrome.  Advised pushing fluids.  Bowel reset diet. Will call patient with urine culture results. Did advise ER if symptoms worsen. Again no evidence of right lower quadrant tenderness to palpation, appendicitis. Low suspicion for ovarian cysts as patient is on estrogen birth control. Evidently, she did have a history of ovarian cysts on the right side when she was 17 and has not had a recurrence of this.    Patient was in agreement with this treatment plan and seemed to understand without barriers. All questions were encouraged and answered.  Reviewed signs and symptoms of when to seek emergency medical care.     Please note that this dictation was created using voice recognition software.  I have made every reasonable attempt to correct obvious errors, but I expect there are errors of roxanna and possibly content that I did not discover before finalizing the note.

## 2018-02-11 NOTE — PATIENT INSTRUCTIONS
Try a bowel reset diet:  Clear liquids for 24 hours  Full liquids for next 24 hrs  BRAT diet after that for 2-3 days  B Banana  R Rice  A Applesauce  T Toast

## 2018-02-12 DIAGNOSIS — R11.0 NAUSEA: ICD-10-CM

## 2018-02-12 DIAGNOSIS — R10.30 LOWER ABDOMINAL PAIN: ICD-10-CM

## 2018-02-12 LAB
AMBIGUOUS DTTM AMBI4: NORMAL
SIGNIFICANT IND 70042: NORMAL
SITE SITE: NORMAL
SOURCE SOURCE: NORMAL

## 2018-02-15 ENCOUNTER — TELEPHONE (OUTPATIENT)
Dept: URGENT CARE | Facility: PHYSICIAN GROUP | Age: 28
End: 2018-02-15

## 2018-02-15 DIAGNOSIS — N30.01 ACUTE CYSTITIS WITH HEMATURIA: ICD-10-CM

## 2018-02-15 LAB
BACTERIA UR CULT: ABNORMAL
BACTERIA UR CULT: ABNORMAL
SIGNIFICANT IND 70042: ABNORMAL
SITE SITE: ABNORMAL
SOURCE SOURCE: ABNORMAL

## 2018-02-15 RX ORDER — CEPHALEXIN 500 MG/1
500 CAPSULE ORAL 2 TIMES DAILY
Qty: 14 CAP | Refills: 0 | Status: SHIPPED | OUTPATIENT
Start: 2018-02-15 | End: 2018-02-22

## 2018-02-15 NOTE — TELEPHONE ENCOUNTER
Call patient left message. Patient did not self identify voicemail. Informed her of positive lab that she requires antibiotics. Like to inform her that she has urinary tract infection and antibody except as sent to the pharmacy.

## 2018-02-15 NOTE — TELEPHONE ENCOUNTER
Patient return phone call. Still having lower abdominal pain but improved. Still having slight nausea. No increased urinary frequency, urgency, or dysuria. Patient states she normally has his symptoms with urinary tract infection. Discussed urine culture results and advised monitored over the next 24-48 hours. Start metabolic is urinary tract symptoms develop.  All questions encouraged and answered.

## 2018-06-13 ENCOUNTER — OFFICE VISIT (OUTPATIENT)
Dept: MEDICAL GROUP | Facility: PHYSICIAN GROUP | Age: 28
End: 2018-06-13
Payer: OTHER GOVERNMENT

## 2018-06-13 VITALS
TEMPERATURE: 97.9 F | OXYGEN SATURATION: 100 % | DIASTOLIC BLOOD PRESSURE: 68 MMHG | HEART RATE: 85 BPM | HEIGHT: 62 IN | SYSTOLIC BLOOD PRESSURE: 108 MMHG | BODY MASS INDEX: 23 KG/M2 | WEIGHT: 125 LBS

## 2018-06-13 DIAGNOSIS — Z32.01 POSITIVE PREGNANCY TEST: ICD-10-CM

## 2018-06-13 DIAGNOSIS — N92.6 MISSED PERIOD: ICD-10-CM

## 2018-06-13 LAB
INT CON NEG: NEGATIVE
INT CON POS: POSITIVE
POC URINE PREGNANCY TEST: POSITIVE

## 2018-06-13 PROCEDURE — 99213 OFFICE O/P EST LOW 20 MIN: CPT | Performed by: FAMILY MEDICINE

## 2018-06-13 PROCEDURE — 81025 URINE PREGNANCY TEST: CPT | Performed by: FAMILY MEDICINE

## 2018-06-13 RX ORDER — FAMOTIDINE 20 MG
1 TABLET ORAL DAILY
Qty: 90 TAB | Refills: 2 | Status: SHIPPED | OUTPATIENT
Start: 2018-06-13 | End: 2020-03-13

## 2018-06-13 ASSESSMENT — PAIN SCALES - GENERAL: PAINLEVEL: NO PAIN

## 2018-06-14 NOTE — PROGRESS NOTES
"Subjective:   Abby LLANES is a 27 y.o. female here today for positive pregnancy test    HPI :     Home pregnancy test 1 week ago was positive.Patient was on Nuvaring for contraception but she did not replace the ring after April.Her last period was on 5/2/18.States that her cycles are always regular and she missed a period and that is when she did the pregnancy test.Denies morning sickness, abdominal pain, vaginal bleeding, discharge.She takes Benadryl for allergies.      Current medicines (including changes today)  Current Outpatient Prescriptions   Medication Sig Dispense Refill   • Prenatal Vit-Fe Fumarate-FA (PRENATAL COMPLETE) 14-0.4 MG Tab Take 1 Tab by mouth every day. 90 Tab 2     No current facility-administered medications for this visit.      She  has a past medical history of Anxiety and depression and Migraine.    ROS   No chest pain, no shortness of breath, no abdominal pain       Objective:     Blood pressure 108/68, pulse 85, temperature 36.6 °C (97.9 °F), height 1.575 m (5' 2\"), weight 56.7 kg (125 lb), last menstrual period 05/02/2018, SpO2 100 %, not currently breastfeeding. Body mass index is 22.86 kg/m².     PHYSICAL EXAM     GEN: Alert and oriented,well appearing, no acute distress  SKIN: warm, dry to touch, no rashes or lesions in visible areas  PSYCH: mood and affect normal, judgement normal  EYES: Conjunctiva clear, lids normal,pupils equal round and reactive  ENMT: Normal external nose and ears,EACs normal appearing, TM pearly gray with normal light reflex bilaterally,nasal mucosa and turbinates normal appearing without erythema or edema, lips without lesions,good dentition,oropharynx clear  Neck : Trachea midline, no masses or swelling, no thyromegaly  LYMPHATIC : No cervical or supraclavicular lymphadenopathy  RESPIRATORY : Unlabored respiratory effort, no distress noted, clear to auscultation bilaterally, no wheeze, rhonchi, crackles  CARDIOVASCULAR: RRR, S1 S2 normal, no murmurs , " gallop , no carotid bruit, no edema of the extremities, pedal pulses B/L 2+  GI: Soft, Non tender, No rebound or guarding, no hepatosplenomegaly, no masses  MUSCULOSKELETAL : Normal gait and stance, no obvious abnormalities   NEURO: No overt focal neurologic deficits,sensation intact        Assessment and Plan:   The following treatment plan was discussed    1. Missed period  2. Positive pregnancy test  Pregnancy test in clinic today positive  Ordered 1st trimester OB ultrasound  Referral placed to OB/Gyn for management of pregnancy  Advised to start prenatal vitamins daily.Discussed pregnancy precautions.Advised not to take any medications except Tylenol and Benadryl as needed.No smoking or drinking alcohol.  - POCT Pregnancy      Followup: Return if symptoms worsen or fail to improve.         Please note that this dictation was created using voice recognition software. I have made every reasonable attempt to correct obvious errors, but I expect that there are errors of grammar and possibly content that I did not discover before finalizing the note.

## 2018-06-29 ENCOUNTER — HOSPITAL ENCOUNTER (OUTPATIENT)
Dept: RADIOLOGY | Facility: MEDICAL CENTER | Age: 28
End: 2018-06-29
Attending: FAMILY MEDICINE
Payer: OTHER GOVERNMENT

## 2018-06-29 DIAGNOSIS — Z32.01 POSITIVE PREGNANCY TEST: ICD-10-CM

## 2018-06-29 PROCEDURE — 76801 OB US < 14 WKS SINGLE FETUS: CPT

## 2018-07-01 DIAGNOSIS — Z3A.09 9 WEEKS GESTATION OF PREGNANCY: ICD-10-CM

## 2018-07-01 DIAGNOSIS — N83.12 CORPUS LUTEUM CYST OF LEFT OVARY: ICD-10-CM

## 2018-07-20 ENCOUNTER — HOSPITAL ENCOUNTER (OUTPATIENT)
Dept: RADIOLOGY | Facility: MEDICAL CENTER | Age: 28
End: 2018-07-20
Attending: FAMILY MEDICINE
Payer: OTHER GOVERNMENT

## 2018-07-20 DIAGNOSIS — Z3A.09 9 WEEKS GESTATION OF PREGNANCY: ICD-10-CM

## 2018-07-20 DIAGNOSIS — N83.12 CORPUS LUTEUM CYST OF LEFT OVARY: ICD-10-CM

## 2018-07-20 PROCEDURE — 76801 OB US < 14 WKS SINGLE FETUS: CPT

## 2018-12-06 ENCOUNTER — APPOINTMENT (OUTPATIENT)
Dept: PEDIATRICS | Facility: CLINIC | Age: 28
End: 2018-12-06
Payer: MEDICAID

## 2018-12-13 ENCOUNTER — APPOINTMENT (OUTPATIENT)
Dept: PEDIATRICS | Facility: CLINIC | Age: 28
End: 2018-12-13
Payer: MEDICAID

## 2019-01-19 ENCOUNTER — NON-PROVIDER VISIT (OUTPATIENT)
Dept: URGENT CARE | Facility: PHYSICIAN GROUP | Age: 29
End: 2019-01-19

## 2019-01-19 DIAGNOSIS — Z02.1 PRE-EMPLOYMENT DRUG SCREENING: ICD-10-CM

## 2019-01-19 LAB
AMP AMPHETAMINE: NORMAL
COC COCAINE: NORMAL
INT CON NEG: NEGATIVE
INT CON POS: POSITIVE
MET METHAMPHETAMINES: NORMAL
OPI OPIATES: NORMAL
PCP PHENCYCLIDINE: NORMAL
POC DRUG COMMENT 753798-OCCUPATIONAL HEALTH: NORMAL
THC: NORMAL

## 2019-01-19 PROCEDURE — 80305 DRUG TEST PRSMV DIR OPT OBS: CPT | Performed by: PHYSICIAN ASSISTANT

## 2019-04-09 ENCOUNTER — OFFICE VISIT (OUTPATIENT)
Dept: URGENT CARE | Facility: CLINIC | Age: 29
End: 2019-04-09
Payer: COMMERCIAL

## 2019-04-09 ENCOUNTER — HOSPITAL ENCOUNTER (OUTPATIENT)
Facility: MEDICAL CENTER | Age: 29
End: 2019-04-09
Attending: PHYSICIAN ASSISTANT
Payer: COMMERCIAL

## 2019-04-09 VITALS
HEART RATE: 82 BPM | BODY MASS INDEX: 23 KG/M2 | SYSTOLIC BLOOD PRESSURE: 122 MMHG | DIASTOLIC BLOOD PRESSURE: 60 MMHG | TEMPERATURE: 97.4 F | OXYGEN SATURATION: 95 % | HEIGHT: 62 IN | WEIGHT: 125 LBS | RESPIRATION RATE: 14 BRPM

## 2019-04-09 DIAGNOSIS — L03.319 CELLULITIS AND ABSCESS OF TRUNK: ICD-10-CM

## 2019-04-09 DIAGNOSIS — L02.219 CELLULITIS AND ABSCESS OF TRUNK: ICD-10-CM

## 2019-04-09 PROCEDURE — 10061 I&D ABSCESS COMP/MULTIPLE: CPT | Performed by: PHYSICIAN ASSISTANT

## 2019-04-09 PROCEDURE — 87186 SC STD MICRODIL/AGAR DIL: CPT

## 2019-04-09 PROCEDURE — 87070 CULTURE OTHR SPECIMN AEROBIC: CPT

## 2019-04-09 PROCEDURE — 87077 CULTURE AEROBIC IDENTIFY: CPT

## 2019-04-09 PROCEDURE — 99214 OFFICE O/P EST MOD 30 MIN: CPT | Mod: 25 | Performed by: PHYSICIAN ASSISTANT

## 2019-04-09 RX ORDER — SULFAMETHOXAZOLE AND TRIMETHOPRIM 800; 160 MG/1; MG/1
1 TABLET ORAL 2 TIMES DAILY
Qty: 14 TAB | Refills: 0 | Status: SHIPPED | OUTPATIENT
Start: 2019-04-09 | End: 2019-05-08

## 2019-04-09 ASSESSMENT — ENCOUNTER SYMPTOMS
VOMITING: 0
CHILLS: 0
ROS SKIN COMMENTS: ABSCESS RIGHT AXILLA
NAUSEA: 0
ABDOMINAL PAIN: 0
COUGH: 0
FEVER: 0
DIARRHEA: 0
SWOLLEN GLANDS: 0
RESPIRATORY NEGATIVE: 1
CARDIOVASCULAR NEGATIVE: 1

## 2019-04-09 ASSESSMENT — PAIN SCALES - GENERAL: PAINLEVEL: 6=MODERATE PAIN

## 2019-04-09 NOTE — PROGRESS NOTES
"Subjective:      Abby Call is a 28 y.o. female who presents with Other (right armpit has a three bumps, swollen, and very painful x 2 days )            Cyst   This is a new problem. The current episode started in the past 7 days. The problem occurs constantly. The problem has been gradually worsening. Pertinent negatives include no abdominal pain, chest pain, chills, coughing, fever, nausea, swollen glands or vomiting. Nothing aggravates the symptoms. She has tried acetaminophen for the symptoms. The treatment provided no relief.       PMH:  has a past medical history of Anxiety and depression and Migraine.  MEDS:   Current Outpatient Prescriptions:   •  sulfamethoxazole-trimethoprim (BACTRIM DS) 800-160 MG tablet, Take 1 Tab by mouth 2 times a day., Disp: 14 Tab, Rfl: 0  •  Prenatal Vit-Fe Fumarate-FA (PRENATAL COMPLETE) 14-0.4 MG Tab, Take 1 Tab by mouth every day., Disp: 90 Tab, Rfl: 2  ALLERGIES: No Known Allergies  SURGHX: No past surgical history on file.  SOCHX:  reports that she has never smoked. She has never used smokeless tobacco. She reports that she drinks alcohol. She reports that she does not use drugs.  FH: family history includes Arthritis in her maternal grandmother; Cancer in her maternal aunt and maternal grandmother.      Review of Systems   Constitutional: Negative for chills and fever.   HENT: Negative.    Respiratory: Negative.  Negative for cough.    Cardiovascular: Negative.  Negative for chest pain.   Gastrointestinal: Negative for abdominal pain, diarrhea, nausea and vomiting.   Skin:        Abscess right axilla       Medications, Allergies, and current problem list reviewed today in Epic     Objective:     /60   Pulse 82   Temp 36.3 °C (97.4 °F)   Resp 14   Ht 1.575 m (5' 2\")   Wt 56.7 kg (125 lb)   SpO2 95%   BMI 22.86 kg/m²      Physical Exam   Constitutional: She is oriented to person, place, and time. She appears well-developed and well-nourished. No distress. "   HENT:   Head: Normocephalic and atraumatic.   Eyes: Conjunctivae and EOM are normal.   Neck: Normal range of motion. Neck supple.   Cardiovascular: Normal rate, regular rhythm and normal heart sounds.    Pulmonary/Chest: Effort normal and breath sounds normal. No respiratory distress. She has no wheezes.   Lymphadenopathy:     She has axillary adenopathy.   Neurological: She is alert and oriented to person, place, and time.   Skin: Skin is warm and dry. She is not diaphoretic.   Grape sized erythematous, fluctuant, painful abscess in the right axilla.  Surrounding erythema and tenderness.  Regional lymphadenopathy noted.   Psychiatric: She has a normal mood and affect. Her behavior is normal. Judgment and thought content normal.   Nursing note and vitals reviewed.              Assessment/Plan:     1. Cellulitis and abscess of trunk  sulfamethoxazole-trimethoprim (BACTRIM DS) 800-160 MG tablet    CULTURE WOUND W/O GRAM STAIN     Procedure: Incision and Drainage  -Risks, benefits, and alternatives discussed. Risks including infection, bleeding, nerve damage, and poor cosmetic outcome  -Sterile technique throughout  -Local anesthesia with 2% lidocaine   -Incision with #11 blade into fluctuant area with purulent material expressed  -Culture obtained and packaged for lab  -Cavity probed and several loculations bluntly taken down with hemostat  -Irrigated copiously with NS  -Minimal bleeding with good hemostasis achieved  -The patient tolerated the procedure well    Wound check 2 days  OTC meds and conservative measures as discussed    Return to clinic or go to ED if symptoms worsen or persist. Indications for ED discussed at length. Patient voices understanding. Follow-up with your primary care provider in 3-5 days. Red flags discussed. All side effects of medication discussed including allergic response, GI upset, tendon injury, etc.    Please note that this dictation was created using voice recognition software. I  have made every reasonable attempt to correct obvious errors, but I expect that there are errors of grammar and possibly content that I did not discover before finalizing the note.

## 2019-04-11 LAB
BACTERIA WND AEROBE CULT: ABNORMAL
BACTERIA WND AEROBE CULT: ABNORMAL
SIGNIFICANT IND 70042: ABNORMAL
SITE SITE: ABNORMAL
SOURCE SOURCE: ABNORMAL

## 2019-05-08 ENCOUNTER — OFFICE VISIT (OUTPATIENT)
Dept: INTERNAL MEDICINE | Facility: MEDICAL CENTER | Age: 29
End: 2019-05-08
Payer: COMMERCIAL

## 2019-05-08 VITALS
HEART RATE: 82 BPM | SYSTOLIC BLOOD PRESSURE: 124 MMHG | BODY MASS INDEX: 25.73 KG/M2 | HEIGHT: 62 IN | OXYGEN SATURATION: 96 % | TEMPERATURE: 96.8 F | RESPIRATION RATE: 20 BRPM | WEIGHT: 139.8 LBS | DIASTOLIC BLOOD PRESSURE: 74 MMHG

## 2019-05-08 DIAGNOSIS — F32.A ANXIETY AND DEPRESSION: ICD-10-CM

## 2019-05-08 DIAGNOSIS — F41.9 ANXIETY AND DEPRESSION: ICD-10-CM

## 2019-05-08 DIAGNOSIS — M25.552 HIP PAIN, LEFT: ICD-10-CM

## 2019-05-08 DIAGNOSIS — D70.9 NEUTROPENIA, UNSPECIFIED TYPE (HCC): ICD-10-CM

## 2019-05-08 DIAGNOSIS — Z00.00 HEALTHCARE MAINTENANCE: ICD-10-CM

## 2019-05-08 DIAGNOSIS — Z91.09 ENVIRONMENTAL ALLERGIES: ICD-10-CM

## 2019-05-08 PROBLEM — N64.4 PAIN OF LEFT BREAST: Status: RESOLVED | Noted: 2017-09-11 | Resolved: 2019-05-08

## 2019-05-08 PROBLEM — N34.3 DYSURIA-FREQUENCY SYNDROME: Status: RESOLVED | Noted: 2017-10-30 | Resolved: 2019-05-08

## 2019-05-08 PROBLEM — Z32.01 POSITIVE PREGNANCY TEST: Status: RESOLVED | Noted: 2018-06-13 | Resolved: 2019-05-08

## 2019-05-08 PROBLEM — R11.0 NAUSEA: Status: RESOLVED | Noted: 2017-09-11 | Resolved: 2019-05-08

## 2019-05-08 PROBLEM — R35.0 INCREASED URINARY FREQUENCY: Status: RESOLVED | Noted: 2017-10-30 | Resolved: 2019-05-08

## 2019-05-08 PROCEDURE — 99204 OFFICE O/P NEW MOD 45 MIN: CPT | Mod: GC | Performed by: INTERNAL MEDICINE

## 2019-05-08 ASSESSMENT — ENCOUNTER SYMPTOMS
DEPRESSION: 0
SINUS PAIN: 0
SORE THROAT: 1
VOMITING: 0
POLYDIPSIA: 0
SHORTNESS OF BREATH: 0
DIZZINESS: 0
MYALGIAS: 1
COUGH: 0
CHILLS: 0
FEVER: 0
DOUBLE VISION: 0
PALPITATIONS: 0
HEADACHES: 1
FALLS: 0
BLURRED VISION: 0
INSOMNIA: 1
NAUSEA: 1

## 2019-05-08 ASSESSMENT — LIFESTYLE VARIABLES: SUBSTANCE_ABUSE: 0

## 2019-05-08 ASSESSMENT — PAIN SCALES - GENERAL: PAINLEVEL: 2=MINIMAL-SLIGHT

## 2019-05-08 NOTE — PROGRESS NOTES
New Patient to Establish    Reason to establish: Acute Illness    CC: Hip pain    HPI: The patient is a 28 year old female presenting with hip pain.     Hip pain, left: Fractured hip 2016 when she was in the . She stayed off of it for about a year. She had a baby 3 months ago and throughout pregnancy she noticed thather hip was still in pain. The patient states that after pregnancy it has been getting worse. Going up stairs and running/walking fast exacerbates the pain. Tylenol make the pain better. The only medications she is on right now is OTC tylenol and pre-zina.     Anxiety and depression: The patient has a history of anxiety and depression and was taking antidepressants up until January of last year (sertraline). She stopped taking them when she found out she was pregnant.     Environmental allergies: She has been waking up feeling congested and gets headaches in the middle of the day due to congestion. She has been having a clear discharge when blowing her nose. No fevers/chills.     Healthcare maintenance:   Tdap: Had it when she went to the  in .   Pap smear: Last year 2018.     Patient Active Problem List    Diagnosis Date Noted   • Hip pain, left 2019   • Environmental allergies 2019   • Neutropenia (HCC) 10/03/2017   • Migraine without aura and without status migrainosus, not intractable 2017   • Anxiety and depression 2017   • Insomnia disorder 2017       Past Medical History:   Diagnosis Date   • Anxiety and depression    • Migraine        Current Outpatient Prescriptions   Medication Sig Dispense Refill   • Prenatal Vit-Fe Fumarate-FA (PRENATAL COMPLETE) 14-0.4 MG Tab Take 1 Tab by mouth every day. 90 Tab 2     No current facility-administered medications for this visit.        Allergies as of 2019   • (No Known Allergies)       Social History     Social History   • Marital status:      Spouse name: N/A   • Number of children:  N/A   • Years of education: N/A     Occupational History   • Not on file.     Social History Main Topics   • Smoking status: Never Smoker   • Smokeless tobacco: Never Used   • Alcohol use Yes      Comment: on the weekends   • Drug use: No   • Sexual activity: Yes     Partners: Male     Other Topics Concern   • Not on file     Social History Narrative   • No narrative on file       Family History   Problem Relation Age of Onset   • Cancer Maternal Aunt         Breast cancer   • Cancer Maternal Grandmother         Breast cancer   • Arthritis Maternal Grandmother        Past Surgical History:   Procedure Laterality Date   • PRIMARY C SECTION  01/31/2019       ROS: As per HPI. Additional pertinent symptoms as noted below.  Review of Systems   Constitutional: Negative for chills and fever.   HENT: Positive for sore throat (Wakes up with sore throat in morning along with some congestion from allergies). Negative for congestion and sinus pain.    Eyes: Negative for blurred vision and double vision.   Respiratory: Negative for cough and shortness of breath.    Cardiovascular: Negative for chest pain and palpitations.   Gastrointestinal: Positive for nausea (Sometimes feels nauseous when she doesn't drink enough water). Negative for vomiting.   Genitourinary: Negative for dysuria, frequency and urgency.   Musculoskeletal: Positive for myalgias (Right shoulder pain from reast feeding/holding baby). Negative for falls.   Skin: Negative for itching and rash.   Neurological: Positive for headaches (Tylenol for headaches. ). Negative for dizziness.   Endo/Heme/Allergies: Positive for environmental allergies. Negative for polydipsia.   Psychiatric/Behavioral: Negative for depression (Denies post-partum depression), substance abuse and suicidal ideas. The patient has insomnia.      /74 (BP Location: Left arm, Patient Position: Sitting, BP Cuff Size: Adult)   Pulse 82   Temp 36 °C (96.8 °F) (Temporal)   Resp 20   Ht 1.571  "m (5' 1.85\")   Wt 63.4 kg (139 lb 12.8 oz)   LMP 2018 (Exact Date)   SpO2 96%   Breastfeeding? Yes   BMI 25.69 kg/m²     Physical Exam  General:  Alert and oriented, No apparent distress.    Eyes: Pupils equal and reactive. No scleral icterus.    Throat: Clear no erythema or exudates noted.    Neck: Supple. No lymphadenopathy noted. Thyroid not enlarged.    Lungs: Clear to auscultation and percussion bilaterally.    Cardiovascular: Regular rate and rhythm. No murmurs, rubs or gallops.    Abdomen:  Benign. No rebound or guarding noted. There is a well-healing  scar.     Extremities: No clubbing, cyanosis, edema.    Hip: Pain on flexion/extension of left hip. Pain on external rotation of left hip. Pain on palpation of the anterior left hip. There was no tenderness to palpation over right hip, flexion/extension, or external rotation. Gait was normal.       Skin: Clear. No rash or suspicious skin lesions noted.    Assessment and Plan    Hip pain, left  History of hip fracture  -New, worsening  -Pain is likely musculoskeletal given physical exam findings.  However given history of hip fracture and recent pregnancy, will like to check to ensure there is no repeat fracture and that old fracture had healed properly.   -Follow-up hip x-ray  -Follow-up vitamin D  - continue Tylenol as needed for pain  -Refer to physical therapy  -Patient to follow-up in 10 weeks to see if pain is improved    Anxiety and depression  -Chronic, stable  -Patient is no longer on antidepressants.  She does not want to see mental health at this time.  -Denies any postpartum depression.  Denies any suicidal/homicidal thoughts.  -Follow-up vitamin D levels  -Follow-up TSH with reflex to free T4    Environmental allergies  -New  -Patient has been waking up with congestion rhinorrhea that is clear and exacerbated by outdoors.  -Patient is breast-feeding at this time  -Instructed patient that over-the-counter Claritin without Sudafed " would be best suited for her at this time given that she is breast-feeding.     Neutropenia  -Chronic  -The patient on previous labs from 2017 showed that she is neutropenic. Patient has not had follow-up labs since.  She denies any recent illnesses.  -Follow-up CBC with differential    Healthcare maintenance:   Tdap: Had it when she went to the  in 2015. Asked patient to bring in records  Pap smear: Last year 9/2018. Records pending  - Follow-up CBC, CMP, TSH with reflex to free T4, Vitamin D    Followup: Return in about 10 weeks (around 7/17/2019) for Long, Hip pain follow-up.    Signed by: Timothy Baptiste M.D.

## 2019-05-08 NOTE — PATIENT INSTRUCTIONS
Hip Exercises  Ask your health care provider which exercises are safe for you. Do exercises exactly as told by your health care provider and adjust them as directed. It is normal to feel mild stretching, pulling, tightness, or discomfort as you do these exercises, but you should stop right away if you feel sudden pain or your pain gets worse. Do not begin these exercises until told by your health care provider.  STRETCHING AND RANGE OF MOTION EXERCISES   These exercises warm up your muscles and joints and improve the movement and flexibility of your hip. These exercises also help to relieve pain, numbness, and tingling.  Exercise A: Hamstrings, Supine   1. Lie on your back.  2. Loop a belt or towel over the ball of your left / right foot. The ball of your foot is on the walking surface, right under your toes.  3. Straighten your left / right knee and slowly pull on the belt to raise your leg.  ¨ Do not let your left / right knee bend while you do this.  ¨ Keep your other leg flat on the floor.  ¨ Raise the left / right leg until you feel a gentle stretch behind your left / right knee or thigh.  4. Hold this position for __________ seconds.  5. Slowly return your leg to the starting position.  Repeat __________ times. Complete this stretch __________ times a day.  Exercise B: Hip Rotators   1. Lie on your back on a firm surface.  2. Hold your left / right knee with your left / right hand. Hold your ankle with your other hand.  3. Gently pull your left / right knee and rotate your lower leg toward your other shoulder.  ¨ Pull until you feel a stretch in your buttocks.  ¨ Keep your hips and shoulders firmly planted while you do this stretch.  4. Hold this position for __________ seconds.  Repeat __________ times. Complete this stretch __________ times a day.  Exercise C: V-Sit (Hamstrings and Adductors)   1. Sit on the floor with your legs extended in a large “V” shape. Keep your knees straight during this  exercise.  2. Start with your head and chest upright, then bend at your waist to reach for your left foot (position A). You should feel a stretch in your right inner thigh.  3. Hold this position for __________ seconds. Then slowly return to the upright position.  4. Bend at your waist to reach forward (position B). You should feel a stretch behind both of your thighs and knees.  5. Hold this position for __________ seconds. Then slowly return to the upright position.  6. Bend at your waist to reach for your right foot (position C). You should feel a stretch in your left inner thigh.  7. Hold this position for __________ seconds. Then slowly return to the upright position.  Repeat __________ times. Complete this stretch __________ times a day.  Exercise D: Lunge (Hip Flexors)   1. Place your left / right knee on the floor and bend your other knee so that is directly over your ankle. You should be half-kneeling.  2. Keep good posture with your head over your shoulders.  3. Tighten your buttocks to point your tailbone downward. This helps your back to keep from arching too much.  4. You should feel a gentle stretch in the front of your left / right thigh and hip. If you do not feel any resistance, slightly slide your other foot forward and then slowly lunge forward so your knee once again lines up over your ankle.  5. Make sure your tailbone continues to point downward.  6. Hold this position for __________ seconds.  Repeat __________ times. Complete this stretch __________ times a day.  STRENGTHENING EXERCISES   These exercises build strength and endurance in your hip. Endurance is the ability to use your muscles for a long time, even after they get tired.  Exercise E: Bridge (Hip Extensors)   1. Lie on your back on a firm surface with your knees bent and your feet flat on the floor.  2. Tighten your buttocks muscles and lift your bottom off the floor until the trunk of your body is level with your thighs.  ¨ Do not  arch your back.  ¨ You should feel the muscles working in your buttocks and the back of your thighs. If you do not feel these muscles, slide your feet 1-2 inches (2.5-5 cm) farther away from your buttocks.  3. Hold this position for __________ seconds.  4. Slowly lower your hips to the starting position.  5. Let your muscles relax completely between repetitions.  6. If this exercise is too easy, try doing it with your arms crossed over your chest.  Repeat __________ times. Complete this exercise __________ times a day.  Exercise F: Straight Leg Raises - Hip Abductors   1. Lie on your side with your left / right leg in the top position. Lie so your head, shoulder, knee, and hip line up with each other. You may bend your bottom knee to help you balance.  2. Roll your hips slightly forward, so your hips are stacked directly over each other and your left / right knee is facing forward.  3. Leading with your heel, lift your top leg 4-6 inches (10-15 cm). You should feel the muscles in your outer hip lifting.  ¨ Do not let your foot drift forward.  ¨ Do not let your knee roll toward the ceiling.  4. Hold this position for __________ seconds.  5. Slowly return to the starting position.  6. Let your muscles relax completely between repetitions.  Repeat __________ times. Complete this exercise __________ times a day.  Exercise G: Straight Leg Raises - Hip Adductors   1. Lie on your side with your left / right leg in the bottom position. Lie so your head, shoulder, knee, and hip line up. You may place your upper foot in front to help you balance.  2. Roll your hips slightly forward, so your hips are stacked directly over each other and your left / right knee is facing forward.  3. Tense the muscles in your inner thigh and lift your bottom leg 4-6 inches (10-15 cm).  4. Hold this position for __________ seconds.  5. Slowly return to the starting position.  6. Let your muscles relax completely between repetitions.  Repeat  "__________ times. Complete this exercise __________ times a day.  Exercise H: Straight Leg Raises - Quadriceps   1. Lie on your back with your left / right leg extended and your other knee bent.  2. Tense the muscles in the front of your left / right thigh. When you do this, you should see your kneecap slide up or see increased dimpling just above your knee.  3. Tighten these muscles even more and raise your leg 4-6 inches (10-15 cm) off the floor.  4. Hold this position for __________ seconds.  5. Keep these muscles tense as you lower your leg.  6. Relax the muscles slowly and completely between repetitions.  Repeat __________ times. Complete this exercise __________ times a day.  Exercise I: Hip Abductors, Standing  1. Tie one end of a rubber exercise band or tubing to a secure surface, such as a table or pole.  2. Loop the other end of the band or tubing around your left / right ankle.  3. Keeping your ankle with the band or tubing directly opposite of the secured end, step away until there is tension in the tubing or band. Hold onto a chair as needed for balance.  4. Lift your left / right leg out to your side. While you do this:  ¨ Keep your back upright.  ¨ Keep your shoulders over your hips.  ¨ Keep your toes pointing forward.  ¨ Make sure to use your hip muscles to lift your leg. Do not \"throw\" your leg or tip your body to lift your leg.  5. Hold this position for __________ seconds.  6. Slowly return to the starting position.  Repeat __________ times. Complete this exercise __________ times a day.  Exercise J: Squats (Quadriceps)  1.  a door frame so your feet and knees are in line with the frame. You may place your hands on the frame for balance.  2. Slowly bend your knees and lower your hips like you are going to sit in a chair.  ¨ Keep your lower legs in a straight-up-and-down position.  ¨ Do not let your hips go lower than your knees.  ¨ Do not bend your knees lower than told by your health care " provider.  ¨ If your hip pain increases, do not bend as low.  3. Hold this position for ___________ seconds.  4. Slowly push with your legs to return to standing. Do not use your hands to pull yourself to standing.  Repeat __________ times. Complete this exercise __________ times a day.  This information is not intended to replace advice given to you by your health care provider. Make sure you discuss any questions you have with your health care provider.  Document Released: 01/05/2007 Document Revised: 09/11/2017 Document Reviewed: 12/12/2016  Elsevier Interactive Patient Education © 2017 [employer]

## 2019-05-13 ENCOUNTER — HOSPITAL ENCOUNTER (OUTPATIENT)
Dept: RADIOLOGY | Facility: MEDICAL CENTER | Age: 29
End: 2019-05-13
Attending: STUDENT IN AN ORGANIZED HEALTH CARE EDUCATION/TRAINING PROGRAM
Payer: COMMERCIAL

## 2019-05-13 DIAGNOSIS — M25.552 HIP PAIN, LEFT: ICD-10-CM

## 2019-05-13 PROCEDURE — 73521 X-RAY EXAM HIPS BI 2 VIEWS: CPT

## 2019-05-14 ENCOUNTER — HOSPITAL ENCOUNTER (OUTPATIENT)
Dept: LAB | Facility: MEDICAL CENTER | Age: 29
End: 2019-05-14
Attending: STUDENT IN AN ORGANIZED HEALTH CARE EDUCATION/TRAINING PROGRAM
Payer: COMMERCIAL

## 2019-05-14 DIAGNOSIS — D70.9 NEUTROPENIA, UNSPECIFIED TYPE (HCC): ICD-10-CM

## 2019-05-14 DIAGNOSIS — F32.A ANXIETY AND DEPRESSION: ICD-10-CM

## 2019-05-14 DIAGNOSIS — F41.9 ANXIETY AND DEPRESSION: ICD-10-CM

## 2019-05-14 DIAGNOSIS — Z00.00 HEALTHCARE MAINTENANCE: ICD-10-CM

## 2019-05-14 LAB
25(OH)D3 SERPL-MCNC: 18 NG/ML (ref 30–100)
ALBUMIN SERPL BCP-MCNC: 4.5 G/DL (ref 3.2–4.9)
ALBUMIN/GLOB SERPL: 1.4 G/DL
ALP SERPL-CCNC: 78 U/L (ref 30–99)
ALT SERPL-CCNC: 15 U/L (ref 2–50)
ANION GAP SERPL CALC-SCNC: 9 MMOL/L (ref 0–11.9)
AST SERPL-CCNC: 17 U/L (ref 12–45)
BASOPHILS # BLD AUTO: 0.3 % (ref 0–1.8)
BASOPHILS # BLD: 0.02 K/UL (ref 0–0.12)
BILIRUB SERPL-MCNC: 0.5 MG/DL (ref 0.1–1.5)
BUN SERPL-MCNC: 12 MG/DL (ref 8–22)
CALCIUM SERPL-MCNC: 9.7 MG/DL (ref 8.5–10.5)
CHLORIDE SERPL-SCNC: 106 MMOL/L (ref 96–112)
CO2 SERPL-SCNC: 24 MMOL/L (ref 20–33)
CREAT SERPL-MCNC: 0.89 MG/DL (ref 0.5–1.4)
EOSINOPHIL # BLD AUTO: 0.1 K/UL (ref 0–0.51)
EOSINOPHIL NFR BLD: 1.7 % (ref 0–6.9)
ERYTHROCYTE [DISTWIDTH] IN BLOOD BY AUTOMATED COUNT: 43.8 FL (ref 35.9–50)
GLOBULIN SER CALC-MCNC: 3.2 G/DL (ref 1.9–3.5)
GLUCOSE SERPL-MCNC: 74 MG/DL (ref 65–99)
HCT VFR BLD AUTO: 44.1 % (ref 37–47)
HGB BLD-MCNC: 14.7 G/DL (ref 12–16)
IMM GRANULOCYTES # BLD AUTO: 0.01 K/UL (ref 0–0.11)
IMM GRANULOCYTES NFR BLD AUTO: 0.2 % (ref 0–0.9)
LYMPHOCYTES # BLD AUTO: 2.98 K/UL (ref 1–4.8)
LYMPHOCYTES NFR BLD: 51.7 % (ref 22–41)
MCH RBC QN AUTO: 30.6 PG (ref 27–33)
MCHC RBC AUTO-ENTMCNC: 33.3 G/DL (ref 33.6–35)
MCV RBC AUTO: 91.7 FL (ref 81.4–97.8)
MONOCYTES # BLD AUTO: 0.44 K/UL (ref 0–0.85)
MONOCYTES NFR BLD AUTO: 7.6 % (ref 0–13.4)
NEUTROPHILS # BLD AUTO: 2.21 K/UL (ref 2–7.15)
NEUTROPHILS NFR BLD: 38.5 % (ref 44–72)
NRBC # BLD AUTO: 0 K/UL
NRBC BLD-RTO: 0 /100 WBC
PLATELET # BLD AUTO: 249 K/UL (ref 164–446)
PMV BLD AUTO: 10.6 FL (ref 9–12.9)
POTASSIUM SERPL-SCNC: 4 MMOL/L (ref 3.6–5.5)
PROT SERPL-MCNC: 7.7 G/DL (ref 6–8.2)
RBC # BLD AUTO: 4.81 M/UL (ref 4.2–5.4)
SODIUM SERPL-SCNC: 139 MMOL/L (ref 135–145)
TSH SERPL DL<=0.005 MIU/L-ACNC: 1.01 UIU/ML (ref 0.38–5.33)
WBC # BLD AUTO: 5.8 K/UL (ref 4.8–10.8)

## 2019-05-14 PROCEDURE — 84443 ASSAY THYROID STIM HORMONE: CPT

## 2019-05-14 PROCEDURE — 36415 COLL VENOUS BLD VENIPUNCTURE: CPT

## 2019-05-14 PROCEDURE — 80053 COMPREHEN METABOLIC PANEL: CPT

## 2019-05-14 PROCEDURE — 82306 VITAMIN D 25 HYDROXY: CPT

## 2019-05-14 PROCEDURE — 85025 COMPLETE CBC W/AUTO DIFF WBC: CPT

## 2019-07-22 ENCOUNTER — OFFICE VISIT (OUTPATIENT)
Dept: URGENT CARE | Facility: CLINIC | Age: 29
End: 2019-07-22
Payer: COMMERCIAL

## 2019-07-22 ENCOUNTER — HOSPITAL ENCOUNTER (OUTPATIENT)
Facility: MEDICAL CENTER | Age: 29
End: 2019-07-22
Attending: PHYSICIAN ASSISTANT
Payer: COMMERCIAL

## 2019-07-22 VITALS
WEIGHT: 133.8 LBS | BODY MASS INDEX: 25.26 KG/M2 | HEIGHT: 61 IN | TEMPERATURE: 97 F | OXYGEN SATURATION: 96 % | RESPIRATION RATE: 14 BRPM | SYSTOLIC BLOOD PRESSURE: 100 MMHG | DIASTOLIC BLOOD PRESSURE: 72 MMHG | HEART RATE: 85 BPM

## 2019-07-22 DIAGNOSIS — L08.9 SKIN INFECTION: ICD-10-CM

## 2019-07-22 PROCEDURE — 99214 OFFICE O/P EST MOD 30 MIN: CPT | Performed by: PHYSICIAN ASSISTANT

## 2019-07-22 PROCEDURE — 87077 CULTURE AEROBIC IDENTIFY: CPT

## 2019-07-22 PROCEDURE — 87070 CULTURE OTHR SPECIMN AEROBIC: CPT

## 2019-07-22 PROCEDURE — 87186 SC STD MICRODIL/AGAR DIL: CPT

## 2019-07-22 RX ORDER — SULFAMETHOXAZOLE AND TRIMETHOPRIM 800; 160 MG/1; MG/1
1 TABLET ORAL 2 TIMES DAILY
Qty: 14 TAB | Refills: 0 | Status: SHIPPED | OUTPATIENT
Start: 2019-07-22 | End: 2019-07-29

## 2019-07-22 ASSESSMENT — ENCOUNTER SYMPTOMS
EYE REDNESS: 0
NAUSEA: 0
EYE DISCHARGE: 0
FEVER: 0
VOMITING: 0
SHORTNESS OF BREATH: 0
SORE THROAT: 0
HEADACHES: 0
COUGH: 0
ABDOMINAL PAIN: 0

## 2019-07-25 ENCOUNTER — TELEPHONE (OUTPATIENT)
Dept: URGENT CARE | Facility: CLINIC | Age: 29
End: 2019-07-25

## 2019-07-26 ENCOUNTER — PATIENT MESSAGE (OUTPATIENT)
Dept: URGENT CARE | Facility: CLINIC | Age: 29
End: 2019-07-26

## 2019-08-21 ENCOUNTER — NON-PROVIDER VISIT (OUTPATIENT)
Dept: CARDIOLOGY | Facility: MEDICAL CENTER | Age: 29
End: 2019-08-21
Payer: COMMERCIAL

## 2019-08-21 DIAGNOSIS — I49.3 PVC'S (PREMATURE VENTRICULAR CONTRACTIONS): ICD-10-CM

## 2019-08-21 DIAGNOSIS — R00.2 PALPITATIONS: ICD-10-CM

## 2019-08-26 ENCOUNTER — TELEPHONE (OUTPATIENT)
Dept: CARDIOLOGY | Facility: MEDICAL CENTER | Age: 29
End: 2019-08-26

## 2019-08-26 DIAGNOSIS — R00.2 PALPITATIONS: ICD-10-CM

## 2019-08-26 PROCEDURE — 93224 XTRNL ECG REC UP TO 48 HRS: CPT | Performed by: INTERNAL MEDICINE

## 2019-08-26 NOTE — TELEPHONE ENCOUNTER
Need holter order   Received: Today   Message Contents   Juliette Morales, Med Ass't  JOSE Mcmillan!     When you get a chance I need orders for this outside referral for a holter. 24 hours for palpitations coming from [facility] going to [facility].     Thanks     holter ordered

## 2019-11-17 ENCOUNTER — OFFICE VISIT (OUTPATIENT)
Dept: URGENT CARE | Facility: CLINIC | Age: 29
End: 2019-11-17
Payer: COMMERCIAL

## 2019-11-17 ENCOUNTER — APPOINTMENT (OUTPATIENT)
Dept: RADIOLOGY | Facility: IMAGING CENTER | Age: 29
End: 2019-11-17
Attending: PHYSICIAN ASSISTANT
Payer: COMMERCIAL

## 2019-11-17 VITALS
BODY MASS INDEX: 24.4 KG/M2 | HEART RATE: 76 BPM | OXYGEN SATURATION: 98 % | WEIGHT: 132.6 LBS | RESPIRATION RATE: 14 BRPM | TEMPERATURE: 99.6 F | DIASTOLIC BLOOD PRESSURE: 62 MMHG | HEIGHT: 62 IN | SYSTOLIC BLOOD PRESSURE: 122 MMHG

## 2019-11-17 DIAGNOSIS — J06.9 URI WITH COUGH AND CONGESTION: Primary | ICD-10-CM

## 2019-11-17 DIAGNOSIS — J06.9 URI WITH COUGH AND CONGESTION: ICD-10-CM

## 2019-11-17 LAB
FLUAV+FLUBV AG SPEC QL IA: NEGATIVE
INT CON NEG: NORMAL
INT CON POS: NORMAL

## 2019-11-17 PROCEDURE — 99214 OFFICE O/P EST MOD 30 MIN: CPT | Performed by: PHYSICIAN ASSISTANT

## 2019-11-17 PROCEDURE — 71046 X-RAY EXAM CHEST 2 VIEWS: CPT | Mod: TC | Performed by: FAMILY MEDICINE

## 2019-11-17 PROCEDURE — 87804 INFLUENZA ASSAY W/OPTIC: CPT | Performed by: PHYSICIAN ASSISTANT

## 2019-11-17 RX ORDER — DEXTROMETHORPHAN HYDROBROMIDE AND PROMETHAZINE HYDROCHLORIDE 15; 6.25 MG/5ML; MG/5ML
5 SYRUP ORAL EVERY 4 HOURS PRN
Qty: 120 ML | Refills: 0 | Status: SHIPPED | OUTPATIENT
Start: 2019-11-17 | End: 2019-11-17 | Stop reason: SDUPTHER

## 2019-11-17 RX ORDER — DEXTROMETHORPHAN HYDROBROMIDE AND PROMETHAZINE HYDROCHLORIDE 15; 6.25 MG/5ML; MG/5ML
5 SYRUP ORAL EVERY 4 HOURS PRN
Qty: 120 ML | Refills: 0 | Status: SHIPPED | OUTPATIENT
Start: 2019-11-17 | End: 2020-03-13

## 2019-11-17 RX ORDER — PREDNISONE 20 MG/1
TABLET ORAL
Qty: 23 TAB | Refills: 0 | Status: SHIPPED | OUTPATIENT
Start: 2019-11-17 | End: 2020-03-13

## 2019-11-17 RX ORDER — PREDNISONE 20 MG/1
TABLET ORAL
Qty: 23 TAB | Refills: 0 | Status: SHIPPED | OUTPATIENT
Start: 2019-11-17 | End: 2019-11-17 | Stop reason: SDUPTHER

## 2019-11-17 RX ORDER — DOXYCYCLINE HYCLATE 100 MG
100 TABLET ORAL 2 TIMES DAILY
Qty: 14 TAB | Refills: 0 | Status: SHIPPED | OUTPATIENT
Start: 2019-11-17 | End: 2019-11-17 | Stop reason: SDUPTHER

## 2019-11-17 RX ORDER — IPRATROPIUM BROMIDE AND ALBUTEROL SULFATE 2.5; .5 MG/3ML; MG/3ML
3 SOLUTION RESPIRATORY (INHALATION) ONCE
Status: COMPLETED | OUTPATIENT
Start: 2019-11-17 | End: 2019-11-17

## 2019-11-17 RX ORDER — DOXYCYCLINE HYCLATE 100 MG
100 TABLET ORAL 2 TIMES DAILY
Qty: 14 TAB | Refills: 0 | Status: SHIPPED | OUTPATIENT
Start: 2019-11-17 | End: 2019-11-24

## 2019-11-17 RX ADMIN — IPRATROPIUM BROMIDE AND ALBUTEROL SULFATE 3 ML: 2.5; .5 SOLUTION RESPIRATORY (INHALATION) at 19:22

## 2019-11-17 NOTE — LETTER
November 17, 2019       Patient: Abby Call   YOB: 1990   Date of Visit: 11/17/2019         To Whom It May Concern:    It is my medical opinion that Abby Call may be excused from work for the dates of 11/18/19-11/20/19.      If you have any questions or concerns, please don't hesitate to call [phone]          Sincerely,          Justin Colón P.A.-C.  Electronically Signed

## 2019-11-18 NOTE — PROGRESS NOTES
Subjective:      Pt is a 28 y.o. female who presents with Congestion (x 1 week, headche, sore throat, coughing, seen in ER 2 weeks ago for same symptoms and given an inhaler + medications condition was not improved)            HPI  This is a new problem. PT presents to  clinic today complaining of sore throat, fevers, chills, body aches, watery eyes, pressure in ears, cough, fatigue, runny nose. PT denies CP, SOB, NVD, abdominal pain, joint pain. PT states these symptoms began around 7 days ago. PT states the pain is a 7/10, aching in nature and worse at night.  Pt has not taken any RX medications for this condition. The pt's medication list, problem list, and allergies have been evaluated and reviewed during today's visit.        PMH:  Past Medical History:   Diagnosis Date   • Anxiety and depression    • Migraine        PSH:  Past Surgical History:   Procedure Laterality Date   • PRIMARY C SECTION  01/31/2019       Fam Hx:  family history includes Arthritis in her maternal grandmother; Cancer in her maternal aunt and maternal grandmother.  Family Status   Relation Name Status   • Mo  Alive   • Fa  Alive   • MAunt  Alive   • MGMo  Alive       Soc HX:  Social History     Socioeconomic History   • Marital status:      Spouse name: Not on file   • Number of children: Not on file   • Years of education: Not on file   • Highest education level: Not on file   Occupational History   • Not on file   Social Needs   • Financial resource strain: Not on file   • Food insecurity:     Worry: Not on file     Inability: Not on file   • Transportation needs:     Medical: Not on file     Non-medical: Not on file   Tobacco Use   • Smoking status: Never Smoker   • Smokeless tobacco: Never Used   Substance and Sexual Activity   • Alcohol use: Yes     Comment: on the weekends   • Drug use: No   • Sexual activity: Yes     Partners: Male   Lifestyle   • Physical activity:     Days per week: Not on file     Minutes per session:  Not on file   • Stress: Not on file   Relationships   • Social connections:     Talks on phone: Not on file     Gets together: Not on file     Attends [employer] service: Not on file     Active member of club or organization: Not on file     Attends meetings of clubs or organizations: Not on file     Relationship status: Not on file   • Intimate partner violence:     Fear of current or ex partner: Not on file     Emotionally abused: Not on file     Physically abused: Not on file     Forced sexual activity: Not on file   Other Topics Concern   • Not on file   Social History Narrative   • Not on file         Medications:    Current Outpatient Medications:   •  Prenatal Vit-Fe Fumarate-FA (PRENATAL COMPLETE) 14-0.4 MG Tab, Take 1 Tab by mouth every day., Disp: 90 Tab, Rfl: 2    Current Facility-Administered Medications:   •  ipratropium-albuterol (DUONEB) nebulizer solution, 3 mL, Nebulization, Once, RICARDO HernandezAARACELI      Allergies:  Patient has no known allergies.    ROS  Constitutional: Positive for chills, fevers, body aches and malaise/fatigue.   HENT: Positive for congestion and sore throat. Negative for ear pain.    Eyes: Negative for blurred vision, double vision and photophobia.   Respiratory: Positive for cough and sputum production. Negative for hemoptysis, shortness of breath and wheezing.    Cardiovascular: Negative for chest pain and palpitations.   Gastrointestinal: Negative for nausea, vomiting, abdominal pain, diarrhea and constipation.   Genitourinary: Negative for dysuria and flank pain.   Musculoskeletal: Negative for falls and myalgias.   Skin: Negative for itching and rash.   Neurological: Positive for headaches. Negative for dizziness and tingling.   Endo/Heme/Allergies: Does not bruise/bleed easily.   Psychiatric/Behavioral: Negative for depression. The patient is not nervous/anxious.               Objective:     /62 (Patient Position: Sitting)   Pulse 76   Temp 37.6 °C (99.6 °F)  "(Temporal)   Resp 14   Ht 1.575 m (5' 2\")   Wt 60.1 kg (132 lb 9.6 oz)   SpO2 98%   BMI 24.25 kg/m²      Physical Exam      Physical Exam   Constitutional: PT is oriented to person, place, and time. PT appears well-developed and well-nourished. No distress.   HENT:   Head: Normocephalic and atraumatic.   Right Ear: Hearing, tympanic membrane, external ear and ear canal normal.   Left Ear: Hearing, tympanic membrane, external ear and ear canal normal.   Nose: Mucosal edema, rhinorrhea and sinus tenderness present. Right sinus exhibits frontal sinus tenderness. Left sinus exhibits frontal sinus tenderness.   Mouth/Throat: Uvula is midline. Mucous membranes are pale. Posterior oropharyngeal edema and posterior oropharyngeal erythema present. No oropharyngeal exudate.   Eyes: Conjunctivae normal and EOM are normal. Pupils are equal, round, and reactive to light. Right eye exhibits no discharge. Left eye exhibits no discharge.   Neck: Normal range of motion. Neck supple. No thyromegaly present.   Cardiovascular: Normal rate, regular rhythm, normal heart sounds and intact distal pulses.  Exam reveals no gallop and no friction rub.    No murmur heard.  Pulmonary/Chest: Effort normal. No respiratory distress. PT has wheezes. PT has no rales. PT exhibits tenderness.   Abdominal: Soft. Bowel sounds are normal. PT exhibits no distension and no mass. There is no tenderness. There is no rebound and no guarding.   Musculoskeletal: Normal range of motion. PT exhibits no edema and no tenderness.   Lymphadenopathy:     PT has no cervical adenopathy.   Neurological: Pt is alert and oriented to person, place, and time. Pt has normal reflexes. No cranial nerve deficit.   Skin: Skin is warm and dry. No rash noted. No erythema.   Psychiatric: PT has a normal mood and affect. Pt behavior is normal. Judgment and thought content normal.      RADS:  DX-CHEST-2 VIEWS   Order: [id number]   Status:  Final result   Visible to patient:  No " (Not Released)   Next appt:  None   Dx:  URI with cough and congestion   Details     Reading Physician Reading Date Result Priority   Everton Gordon M.D. 11/17/2019 Urgent [facility]      Narrative & Impression        11/17/2019 6:12 PM     HISTORY/REASON FOR EXAM:  Shortness of breath     TECHNIQUE/EXAM DESCRIPTION:  PA and lateral views of the chest.     COMPARISON:  None     FINDINGS:     The heart is not enlarged. Symmetric nodular opacities projecting over the lung bases likely represent nipple shadows. No focal consolidation, pleural effusion or pneumothorax is identified.  Costophrenic angles are clear.           IMPRESSION:     No acute cardiopulmonary process is identified.            Last Resulted: 11/17/19  6:23 PM               Assessment/Plan:       1. Bronchitis    2. URI with cough and congestion    - DX-CHEST-2 VIEWS; Future  - POCT Influenza A/B-->NEG  - ipratropium-albuterol (DUONEB) nebulizer solution    Doxycycline  Prednisone  Advair  Promethazine DM  Rest, fluids encouraged.  OTC decongestant for congestion/cough  Note given for work.  AVS with medical info given.  Pt was in full understanding and agreement with the plan.  Differential diagnosis, natural history, supportive care, and indications for immediate follow-up discussed. All questions answered. Patient agrees with the plan of care.  Follow-up as needed if symptoms worsen or fail to improve to PCP, Urgent care or Emergency Room.

## 2020-01-08 ENCOUNTER — OFFICE VISIT (OUTPATIENT)
Dept: URGENT CARE | Facility: CLINIC | Age: 30
End: 2020-01-08
Payer: COMMERCIAL

## 2020-01-08 ENCOUNTER — APPOINTMENT (OUTPATIENT)
Dept: RADIOLOGY | Facility: IMAGING CENTER | Age: 30
End: 2020-01-08
Attending: PHYSICIAN ASSISTANT
Payer: COMMERCIAL

## 2020-01-08 VITALS
DIASTOLIC BLOOD PRESSURE: 68 MMHG | BODY MASS INDEX: 20.43 KG/M2 | RESPIRATION RATE: 16 BRPM | HEIGHT: 62 IN | SYSTOLIC BLOOD PRESSURE: 104 MMHG | OXYGEN SATURATION: 100 % | WEIGHT: 111 LBS | HEART RATE: 85 BPM | TEMPERATURE: 98.3 F

## 2020-01-08 DIAGNOSIS — S49.92XA INJURY OF LEFT SHOULDER, INITIAL ENCOUNTER: ICD-10-CM

## 2020-01-08 DIAGNOSIS — M25.512 ARTHRALGIA OF LEFT ACROMIOCLAVICULAR JOINT: ICD-10-CM

## 2020-01-08 PROCEDURE — 99214 OFFICE O/P EST MOD 30 MIN: CPT | Performed by: PHYSICIAN ASSISTANT

## 2020-01-08 PROCEDURE — 73050 X-RAY EXAM OF SHOULDERS: CPT | Mod: TC | Performed by: FAMILY MEDICINE

## 2020-01-08 PROCEDURE — 73030 X-RAY EXAM OF SHOULDER: CPT | Mod: TC,LT | Performed by: FAMILY MEDICINE

## 2020-01-08 RX ORDER — CYCLOBENZAPRINE HCL 5 MG
5-10 TABLET ORAL 3 TIMES DAILY PRN
Qty: 21 TAB | Refills: 0 | Status: SHIPPED | OUTPATIENT
Start: 2020-01-08 | End: 2020-03-13

## 2020-01-08 RX ORDER — KETOROLAC TROMETHAMINE 30 MG/ML
30 INJECTION, SOLUTION INTRAMUSCULAR; INTRAVENOUS ONCE
Status: COMPLETED | OUTPATIENT
Start: 2020-01-08 | End: 2020-01-08

## 2020-01-08 RX ADMIN — KETOROLAC TROMETHAMINE 30 MG: 30 INJECTION, SOLUTION INTRAMUSCULAR; INTRAVENOUS at 18:08

## 2020-01-08 ASSESSMENT — ENCOUNTER SYMPTOMS
BACK PAIN: 1
MYALGIAS: 0
TINGLING: 0
SENSORY CHANGE: 0
NUMBNESS: 0
MUSCLE WEAKNESS: 0
NECK PAIN: 1
FALLS: 0
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
## 2020-03-13 ENCOUNTER — OFFICE VISIT (OUTPATIENT)
Dept: MEDICAL GROUP | Facility: MEDICAL CENTER | Age: 30
End: 2020-03-13
Attending: PHYSICIAN ASSISTANT
Payer: COMMERCIAL

## 2020-03-13 VITALS
TEMPERATURE: 97.7 F | SYSTOLIC BLOOD PRESSURE: 100 MMHG | RESPIRATION RATE: 16 BRPM | HEIGHT: 62 IN | DIASTOLIC BLOOD PRESSURE: 62 MMHG | WEIGHT: 105.6 LBS | BODY MASS INDEX: 19.43 KG/M2 | HEART RATE: 80 BPM | OXYGEN SATURATION: 100 %

## 2020-03-13 DIAGNOSIS — F32.A ANXIETY AND DEPRESSION: ICD-10-CM

## 2020-03-13 DIAGNOSIS — Z91.09 ENVIRONMENTAL ALLERGIES: ICD-10-CM

## 2020-03-13 DIAGNOSIS — Z30.015 ENCOUNTER FOR INITIAL PRESCRIPTION OF VAGINAL RING HORMONAL CONTRACEPTIVE: ICD-10-CM

## 2020-03-13 DIAGNOSIS — Z00.00 HEALTH CARE MAINTENANCE: ICD-10-CM

## 2020-03-13 DIAGNOSIS — F41.9 ANXIETY AND DEPRESSION: ICD-10-CM

## 2020-03-13 DIAGNOSIS — E55.9 VITAMIN D DEFICIENCY: ICD-10-CM

## 2020-03-13 PROCEDURE — 99204 OFFICE O/P NEW MOD 45 MIN: CPT | Performed by: PHYSICIAN ASSISTANT

## 2020-03-13 PROCEDURE — 99213 OFFICE O/P EST LOW 20 MIN: CPT | Performed by: PHYSICIAN ASSISTANT

## 2020-03-13 RX ORDER — SERTRALINE HYDROCHLORIDE 25 MG/1
TABLET, FILM COATED ORAL
COMMUNITY
Start: 2019-07-26 | End: 2020-08-09

## 2020-03-13 RX ORDER — FAMOTIDINE 20 MG
TABLET ORAL
COMMUNITY
Start: 2018-06-13 | End: 2020-03-13

## 2020-03-13 RX ORDER — ETONOGESTREL/ETHINYL ESTRADIOL .12-.015MG
RING, VAGINAL VAGINAL
Qty: 1 EACH | Refills: 6
Start: 2020-03-13 | End: 2020-03-13 | Stop reason: SDUPTHER

## 2020-03-13 RX ORDER — FLUTICASONE PROPIONATE 50 MCG
2 SPRAY, SUSPENSION (ML) NASAL DAILY
Qty: 16 G | Refills: 3
Start: 2020-03-13 | End: 2020-03-13 | Stop reason: SDUPTHER

## 2020-03-13 RX ORDER — FLUTICASONE PROPIONATE 50 MCG
1 SPRAY, SUSPENSION (ML) NASAL DAILY
Qty: 16 G | Refills: 2 | Status: CANCELLED
Start: 2020-03-13

## 2020-03-13 SDOH — HEALTH STABILITY: MENTAL HEALTH: HOW MANY STANDARD DRINKS CONTAINING ALCOHOL DO YOU HAVE ON A TYPICAL DAY?: 1 OR 2

## 2020-03-13 ASSESSMENT — PATIENT HEALTH QUESTIONNAIRE - PHQ9
1. LITTLE INTEREST OR PLEASURE IN DOING THINGS: NOT AT ALL
4. FEELING TIRED OR HAVING LITTLE ENERGY: NOT AT ALL
5. POOR APPETITE OR OVEREATING: NOT AT ALL
7. TROUBLE CONCENTRATING ON THINGS, SUCH AS READING THE NEWSPAPER OR WATCHING TELEVISION: NOT AT ALL
3. TROUBLE FALLING OR STAYING ASLEEP OR SLEEPING TOO MUCH: NOT AT ALL
8. MOVING OR SPEAKING SO SLOWLY THAT OTHER PEOPLE COULD HAVE NOTICED. OR THE OPPOSITE, BEING SO FIGETY OR RESTLESS THAT YOU HAVE BEEN MOVING AROUND A LOT MORE THAN USUAL: NOT AT ALL
9. THOUGHTS THAT YOU WOULD BE BETTER OFF DEAD, OR OF HURTING YOURSELF: NOT AT ALL
2. FEELING DOWN, DEPRESSED, IRRITABLE, OR HOPELESS: NOT AT ALL
6. FEELING BAD ABOUT YOURSELF - OR THAT YOU ARE A FAILURE OR HAVE LET YOURSELF OR YOUR FAMILY DOWN: NOT AL ALL
SUM OF ALL RESPONSES TO PHQ QUESTIONS 1-9: 0
SUM OF ALL RESPONSES TO PHQ9 QUESTIONS 1 AND 2: 0

## 2020-03-13 ASSESSMENT — FIBROSIS 4 INDEX: FIB4 SCORE: 0.53

## 2020-03-13 NOTE — ASSESSMENT & PLAN NOTE
"She reports her anxiety and depression began in 2017. She was previously put on Zoloft by her last PCP. She discontinued the medication on her own when she was going through a divorce and found out that she was pregnant. She states that Zoloft has really helped her in the past. She reports that her anxiety is usually situational. Specifically when she stresses out. She has moments of \"not being able to breath and a fast heart beat\". That \"everything gets out of control and then she spirals\". She reports that this typically lasts a few minutes. She will usually play \"calming music\" and that seems to help. She has attended behavioral therapy in the past, but has had a problem talking about her feelings. She is interested in trying this out again. Denies any symptoms of dizziness, light headedness, feeling of going to pass out or visual changes.     "

## 2020-03-13 NOTE — PROGRESS NOTES
"Subjective:     CC:  Diagnoses of Anxiety and depression, Environmental allergies, Vitamin D deficiency, Health care maintenance, and Encounter for initial prescription of vaginal ring hormonal contraceptive were pertinent to this visit.    HISTORY OF THE PRESENT ILLNESS: Patient is a 29 y.o. female. This pleasant patient is here today to establish care and discuss:     Anxiety and depression  She reports her anxiety and depression began in 2017. She was previously put on Zoloft by her last PCP. She discontinued the medication on her own when she was going through a divorce and found out that she was pregnant. She states that Zoloft has really helped her in the past. She reports that her anxiety is usually situational. Specifically when she stresses out. She has moments of \"not being able to breath and a fast heart beat\". That \"everything gets out of control and then she spirals\". She reports that this typically lasts a few minutes. She will usually play \"calming music\" and that seems to help. She has attended behavioral therapy in the past, but has had a problem talking about her feelings. She is interested in trying this out again. Denies any symptoms of dizziness, light headedness, feeling of going to pass out or visual changes.       Environmental allergies  She reports \"plugged and itchy\" bilateral ears that began early this week. Reports associated symptoms of an dry, itchy throat, worse when lying down and a mild cough.  Denies fever/chills, N/V, congestion, sputum production or difficulty breathing.   She has tried cough drops and pseudafed which has not seemed to help.   She does report a hx of seasonal allergies in which she usually takes flonase for, but reports that she has not been taking it. Here today for further evaluation.     Encounter for birth control  Reports that she was previously on Nuvaring prior to getting pregnant. Reports that it helped regulate her cycle and that she was adherent to " using the ring over OCP's. She would like to start up on birth control again. Reports that she really liked Nuvaring and felt it worked best for her. She denies any side effects or complications with this previously.      LMP: Feb 27th.       Allergies: Patient has no known allergies.    Current Outpatient Medications Ordered in Epic   Medication Sig Dispense Refill   • ethinyl estradiol-etonogestrel (NUVARING) 0.12-0.015 MG/24HR vaginal ring Inserted 1 ring into vagina and wear continuously for 3 weeks; remove for 1 week; repeat with new ring 1 Each 6   • fluticasone (FLONASE) 50 MCG/ACT nasal spray Spray 2 Sprays in nose every day. 16 g 3   • sertraline (ZOLOFT) 25 MG tablet SERTRALINE HCL 25 MG TABS     • fluticasone-salmeterol (ADVAIR) 250-50 MCG/DOSE AEROSOL POWDER, BREATH ACTIVATED Inhale 1 Puff by mouth every 12 hours. (Patient not taking: Reported on 3/13/2020) 1 Inhaler 0     No current Epic-ordered facility-administered medications on file.        Past Medical History:   Diagnosis Date   • Anxiety and depression    • Migraine        Past Surgical History:   Procedure Laterality Date   • PRIMARY C SECTION  01/31/2019       Social History     Tobacco Use   • Smoking status: Never Smoker   • Smokeless tobacco: Never Used   Substance Use Topics   • Alcohol use: Yes     Alcohol/week: 4.2 oz     Types: 7 Glasses of wine per week     Drinks per session: 1 or 2     Comment: on the weekends   • Drug use: No       Social History     Social History Narrative   • Not on file       Family History   Problem Relation Age of Onset   • Cancer Father    • Cancer Maternal Aunt         Breast cancer   • Cancer Maternal Grandmother         Breast cancer   • Arthritis Maternal Grandmother    • Cancer Paternal Grandfather         Prostate CA       Health Maintenance: Will discuss at follow up appointment.     ROS:   Gen: no fevers/chills, + weight loss  Eyes: no changes in vision  ENT: + dry/itchy throat , no hearing loss, no  "bloody nose  Pulm: + sob - during anxiety, + mild cough - due to itchy throat  CV: no chest pain, no palpitations, + tachycardia - during anxiety  GI: no nausea/vomiting, no diarrhea  : no dysuria  MSk: no myalgias  Skin: no rash  Neuro: no headaches, no numbness/tingling  Heme/Lymph: no easy bruising      Objective:     Exam: /62   Pulse 80   Temp 36.5 °C (97.7 °F) (Temporal)   Resp 16   Ht 1.575 m (5' 2\")   Wt 47.9 kg (105 lb 9.6 oz)   SpO2 100%  Body mass index is 19.31 kg/m².    General: Normal appearing. No distress.  HEENT: Normocephalic. Lids without ptosis, pupils equal and reactive to light accommodation, ears normal shape and contour, canals are mildly erythematous bilaterally, tympanic membranes are benign, nasal mucosa and turbinates are mildly erythematous, oropharynx is mildly erythematous, No edema or exudates.   Neck: Supple. Thyroid is not enlarged.  Pulmonary: Clear to ausculation.  Normal effort. No rales, ronchi, or wheezing.  Cardiovascular: Regular rate and rhythm without murmur. Radial pulses are intact and equal bilaterally.  Neurologic: Grossly nonfocal  Lymph: No cervical or supraclavicular lymph nodes are palpable  Skin: Warm and dry.  No obvious lesions.  Musculoskeletal: Normal gait. No extremity cyanosis, clubbing, or edema.  Psych: Normal mood and affect. Alert and oriented x3. Judgment and insight is normal.    Labs: CBC w/ diff, TSH with reflex to T4, Vitamin D deficiency     Assessment & Plan:   29 y.o. female with the following -    1. Anxiety and depression  This is a chronic condition. She has been on previous treatment with Zoloft by her last PCP which has been beneficial for her. She discontinued the medication when she was going through a divorce and later found out that she was pregnant. Typically her anxiety is situational based. Symptoms such as SOB and tachycardia occur for a \"few minutes\" before resolution. She reports associated uncontrollable/unmanageable " "stress that makes her feel like she is \"spiraling\". I believe this patient would benefit from the option of seeing a Psychiatrist for CBT  and possible readministration of an anxiolytic medication. She has agreed to this plan. In addition to her symptom of tachycardia, she has also experienced symptoms of weight loss. Therefore, we will go ahead and order labs and evaluate for any possible thyroid hormone dysfunction.  -    F/U after psych appointment and completion of labs.   - TSH WITH REFLEX TO FT4; Future  - CBC w/ diff     2. Environmental allergies  She has a history of previous seasonal allergies. She has been taking sudafed and cough drops which has not been beneficial. Due to her symptomology and findings on exam we will:     - Discontinue sudafed use   - Start Flonase 2 puffs in each nostril daily.   - Educated on use of medication.  - Re-evaluate at next follow up.     3. Vitamin D deficiency  Labs previously drawn on 5/2019 showed a vitamin d level of 18. She has not been taking any form of supplementation. Labs have been ordered for re-evaluation.   - VITAMIN D,25 HYDROXY; Future      Return once lab work is complete and after appointment with psych. F/u sooner if any concerns..    "

## 2020-03-13 NOTE — ASSESSMENT & PLAN NOTE
Reports that she was previously on Nuvaring prior to getting pregnant. Reports that it helped regulate her cycle and that she was adherent to using the ring over OCP's. She would like to start up on birth control again. Reports that she really liked Nuvaring and felt it worked best for her. She denies any side effects or complications with this previously.      LMP: Feb 27th.

## 2020-03-13 NOTE — LETTER
[facility]  Mine Rodgers P.A.-C.  [address] [place] [address]  Fax: [phone]   Authorization for Release/Disclosure of   Protected Health Information   Name: ABBY LLANES : 1990 SSN: [id number]   Address: [address] [place] [address] [place] [address] Phone:    [phone] (home)    I authorize the entity listed below to release/disclose the PHI below to:   [facility]/Mine Rodgers P.A.-C. and Mine Rodgers P.A.-C.   Provider or Entity Name:    [facility] - Dr. Marleny Flores   Address   City, [facility], [facility]   Phone:      Fax:     Reason for request: continuity of care   Information to be released:    [  ] LAST COLONOSCOPY,  including any PATH REPORT and follow-up  [  ] LAST FIT/COLOGUARD RESULT [  ] LAST DEXA  [  ] LAST MAMMOGRAM  [  ] LAST PAP  [  ] LAST LABS [  ] RETINA EXAM REPORT  [  ] IMMUNIZATION RECORDS  [ x  ] Release all info      [  ] Check here and initial the line next to each item to release ALL health information INCLUDING  _____ Care and treatment for drug and / or alcohol abuse  _____ HIV testing, infection status, or AIDS  _____ Genetic Testing    DATES OF SERVICE OR TIME PERIOD TO BE DISCLOSED: _____________  I understand and acknowledge that:  * This Authorization may be revoked at any time by you in writing, except if your health information has already been used or disclosed.  * Your health information that will be used or disclosed as a result of you signing this authorization could be re-disclosed by the recipient. If this occurs, your re-disclosed health information may no longer be protected by State or Federal laws.  * You may refuse to sign this Authorization. Your refusal will not affect your ability to obtain treatment.  * This Authorization becomes effective upon signing and will  on (date) __________.      If no date is indicated, this Authorization will  one (1) year from the signature date.    Name: Abby Llanes    Signature:   Date:     3/13/2020       PLEASE FAX REQUESTED RECORDS BACK TO: [phone]

## 2020-03-16 RX ORDER — FLUTICASONE PROPIONATE 50 MCG
2 SPRAY, SUSPENSION (ML) NASAL DAILY
Qty: 16 G | Refills: 0 | Status: SHIPPED | OUTPATIENT
Start: 2020-03-16 | End: 2020-08-09

## 2020-03-16 RX ORDER — ETONOGESTREL/ETHINYL ESTRADIOL .12-.015MG
RING, VAGINAL VAGINAL
Qty: 1 EACH | Refills: 0 | Status: SHIPPED | OUTPATIENT
Start: 2020-03-16 | End: 2020-08-09

## 2020-03-16 NOTE — TELEPHONE ENCOUNTER
I have called in the medication to the pharmacy.  For some reason the prescriptions did not go through electronically.  Please let the patient know that they should be ready for pickup today.  I apologize for the inconvenience.    Thanks,  Mine Rodgers PA-C

## 2020-04-24 ENCOUNTER — OFFICE VISIT (OUTPATIENT)
Dept: MEDICAL GROUP | Facility: MEDICAL CENTER | Age: 30
End: 2020-04-24
Attending: PHYSICIAN ASSISTANT
Payer: COMMERCIAL

## 2020-04-24 ENCOUNTER — HOSPITAL ENCOUNTER (OUTPATIENT)
Dept: LAB | Facility: MEDICAL CENTER | Age: 30
End: 2020-04-24
Attending: PHYSICIAN ASSISTANT
Payer: COMMERCIAL

## 2020-04-24 ENCOUNTER — HOSPITAL ENCOUNTER (OUTPATIENT)
Dept: RADIOLOGY | Facility: MEDICAL CENTER | Age: 30
End: 2020-04-24
Attending: PHYSICIAN ASSISTANT
Payer: COMMERCIAL

## 2020-04-24 VITALS
SYSTOLIC BLOOD PRESSURE: 101 MMHG | HEART RATE: 68 BPM | TEMPERATURE: 98.2 F | BODY MASS INDEX: 19.08 KG/M2 | DIASTOLIC BLOOD PRESSURE: 60 MMHG | RESPIRATION RATE: 16 BRPM | OXYGEN SATURATION: 97 % | HEIGHT: 62 IN | WEIGHT: 103.7 LBS

## 2020-04-24 DIAGNOSIS — F41.9 ANXIETY AND DEPRESSION: ICD-10-CM

## 2020-04-24 DIAGNOSIS — Z91.09 ENVIRONMENTAL ALLERGIES: ICD-10-CM

## 2020-04-24 DIAGNOSIS — M25.532 LEFT WRIST PAIN: ICD-10-CM

## 2020-04-24 DIAGNOSIS — E55.9 VITAMIN D DEFICIENCY: ICD-10-CM

## 2020-04-24 DIAGNOSIS — M79.602 LEFT ARM PAIN: ICD-10-CM

## 2020-04-24 DIAGNOSIS — R63.4 UNINTENTIONAL WEIGHT LOSS: ICD-10-CM

## 2020-04-24 DIAGNOSIS — R63.4 WEIGHT LOSS: ICD-10-CM

## 2020-04-24 DIAGNOSIS — F32.A ANXIETY AND DEPRESSION: ICD-10-CM

## 2020-04-24 LAB
APPEARANCE UR: ABNORMAL
BACTERIA #/AREA URNS HPF: ABNORMAL /HPF
BILIRUB UR QL STRIP.AUTO: NEGATIVE
COLOR UR: YELLOW
CRP SERPL HS-MCNC: 3.7 MG/L (ref 0–7.5)
EPI CELLS #/AREA URNS HPF: ABNORMAL /HPF
ERYTHROCYTE [SEDIMENTATION RATE] IN BLOOD BY WESTERGREN METHOD: 1 MM/HOUR (ref 0–20)
GLUCOSE UR STRIP.AUTO-MCNC: NEGATIVE MG/DL
HCV AB SER QL: NORMAL
HYALINE CASTS #/AREA URNS LPF: ABNORMAL /LPF
KETONES UR STRIP.AUTO-MCNC: ABNORMAL MG/DL
LEUKOCYTE ESTERASE UR QL STRIP.AUTO: ABNORMAL
MICRO URNS: ABNORMAL
MUCOUS THREADS #/AREA URNS HPF: ABNORMAL /HPF
NITRITE UR QL STRIP.AUTO: NEGATIVE
PH UR STRIP.AUTO: 6.5 [PH] (ref 5–8)
PROT UR QL STRIP: NEGATIVE MG/DL
RBC # URNS HPF: ABNORMAL /HPF
RBC UR QL AUTO: NEGATIVE
SP GR UR STRIP.AUTO: 1.04
UROBILINOGEN UR STRIP.AUTO-MCNC: 1 MG/DL
WBC #/AREA URNS HPF: ABNORMAL /HPF

## 2020-04-24 PROCEDURE — 81001 URINALYSIS AUTO W/SCOPE: CPT

## 2020-04-24 PROCEDURE — 36415 COLL VENOUS BLD VENIPUNCTURE: CPT

## 2020-04-24 PROCEDURE — 86803 HEPATITIS C AB TEST: CPT

## 2020-04-24 PROCEDURE — 99213 OFFICE O/P EST LOW 20 MIN: CPT | Performed by: PHYSICIAN ASSISTANT

## 2020-04-24 PROCEDURE — 99214 OFFICE O/P EST MOD 30 MIN: CPT | Performed by: PHYSICIAN ASSISTANT

## 2020-04-24 PROCEDURE — 73110 X-RAY EXAM OF WRIST: CPT | Mod: LT

## 2020-04-24 PROCEDURE — 85652 RBC SED RATE AUTOMATED: CPT

## 2020-04-24 PROCEDURE — 83036 HEMOGLOBIN GLYCOSYLATED A1C: CPT

## 2020-04-24 PROCEDURE — 86141 C-REACTIVE PROTEIN HS: CPT

## 2020-04-24 RX ORDER — CYCLOBENZAPRINE HCL 5 MG
5 TABLET ORAL 3 TIMES DAILY PRN
Qty: 30 TAB | Refills: 0 | Status: SHIPPED | OUTPATIENT
Start: 2020-04-24 | End: 2020-08-09

## 2020-04-24 ASSESSMENT — ENCOUNTER SYMPTOMS
TINGLING: 0
CONSTIPATION: 0
FEVER: 0
CHILLS: 0
SHORTNESS OF BREATH: 0
DIZZINESS: 0
DIARRHEA: 0
BLOOD IN STOOL: 0
COUGH: 0
CLAUDICATION: 0
WEAKNESS: 0
WHEEZING: 0
NAUSEA: 0
HEADACHES: 0
PALPITATIONS: 0
WEIGHT LOSS: 1
VOMITING: 0

## 2020-04-24 ASSESSMENT — FIBROSIS 4 INDEX: FIB4 SCORE: 0.53

## 2020-04-24 NOTE — ASSESSMENT & PLAN NOTE
"Abby reports that 2 weeks ago, she woke up with left wrist pain that has continued to worsen. She reports mild swelling and bruising of the wrist that has since mostly resolved. The pain is located on the lateral dorsal side of the wrist. She describes the pain as a constant ache. The pain is made worse with lifting objects, when using the hand or when direct pressure is applied over the lateral wrist. She reports that over the past few days she has noticed a dull, numbness to the upper arm and shoulder and  reports associated numbness in all five digits. Reports that rubbing the arm \"makes it more sensitive\". She has been taking 500 mg of Tylenol once daily. She denies any known trauma, loss of sensation or motor function. 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 Therefore, she is unaware if she injured herself while busy on the job.     "

## 2020-04-24 NOTE — RESULT ENCOUNTER NOTE
4V left wrist XR's reviewed by me.   No bony or osseous abnormalities. No acute fracture or signs of soft tissue swelling noted.   Unremarkable films.

## 2020-04-24 NOTE — PROGRESS NOTES
"Chief Complaint   Patient presents with   • Arm Pain     Left wrist pain    • Weight Loss   • Follow-Up       Subjective:     HPI:   Abby Call is a 29 y.o. female here to discuss the evaluation and management of:    Left wrist pain  Abby reports that 2 weeks ago, she woke up with left wrist pain that has continued to worsen. She reports mild swelling and bruising of the wrist that has since mostly resolved. The pain is located on the lateral dorsal side of the wrist. She describes the pain as a constant ache. The pain is made worse with lifting objects, when using the hand or when direct pressure is applied over the lateral wrist. She reports that over the past few days she has noticed a dull, numbness to the upper arm and shoulder and  reports associated numbness in all five digits. Reports that rubbing the arm \"makes it more sensitive\". She has been taking 500 mg of Tylenol once daily. She denies any known trauma, loss of sensation or motor function. 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 Therefore, she is unaware if she injured herself while busy on the job.       Weight loss  Abby reports that she has continued to lose weight despite implementing protein shakes. She states that in November of last year she was weighing somewhere in the mid 140's and in just 2 short months lost 20 pounds. She reports that despite her efforts to gain weight, she has continued to lose weight. She states that \"it hasn't been a big number, but I am still losing weight\". She reports that when she is eating she is typically in a hypo caloric state.       ROS  Review of Systems   Constitutional: Positive for weight loss. Negative for chills, fever and malaise/fatigue.   Respiratory: Negative for cough, shortness of breath and wheezing.    Cardiovascular: Negative for chest pain, palpitations, claudication and leg swelling.   Gastrointestinal: Negative for blood in stool, constipation, " diarrhea, melena, nausea and vomiting.   Musculoskeletal: Positive for joint pain.   Neurological: Negative for dizziness, tingling, weakness and headaches.       No Known Allergies    Current medicines (including changes today)  Current Outpatient Medications   Medication Sig Dispense Refill   • cyclobenzaprine (FLEXERIL) 5 MG tablet Take 1 Tab by mouth 3 times a day as needed for Moderate Pain. 30 Tab 0   • ethinyl estradiol-etonogestrel (NUVARING) 0.12-0.015 MG/24HR vaginal ring Inserted 1 ring into vagina and wear continuously for 3 weeks; remove for 1 week; repeat with new ring 1 Each 0   • fluticasone (FLONASE) 50 MCG/ACT nasal spray Spray 2 Sprays in nose every day. 16 g 0   • sertraline (ZOLOFT) 25 MG tablet SERTRALINE HCL 25 MG TABS     • fluticasone-salmeterol (ADVAIR) 250-50 MCG/DOSE AEROSOL POWDER, BREATH ACTIVATED Inhale 1 Puff by mouth every 12 hours. (Patient not taking: Reported on 3/13/2020) 1 Inhaler 0     No current facility-administered medications for this visit.        Social History     Tobacco Use   • Smoking status: Never Smoker   • Smokeless tobacco: Never Used   Substance Use Topics   • Alcohol use: Yes     Alcohol/week: 4.2 oz     Types: 7 Glasses of wine per week     Drinks per session: 1 or 2     Comment: on the weekends   • Drug use: No       Patient Active Problem List    Diagnosis Date Noted   • Left wrist pain 04/24/2020   • Weight loss 04/24/2020   • Hip pain, left 05/08/2019   • Environmental allergies 05/08/2019   • Neutropenia (HCC) 10/03/2017   • Migraine without aura and without status migrainosus, not intractable 09/11/2017   • Anxiety and depression 09/11/2017   • Insomnia disorder 09/11/2017   • Encounter for birth control 09/11/2017       Family History   Problem Relation Age of Onset   • Cancer Father    • Cancer Maternal Aunt         Breast cancer   • Cancer Maternal Grandmother         Breast cancer   • Arthritis Maternal Grandmother    • Cancer Paternal Grandfather      "    Prostate CA        Objective:     /60 (BP Location: Right arm, Patient Position: Sitting, BP Cuff Size: Adult)   Pulse 68   Temp 36.8 °C (98.2 °F) (Temporal)   Resp 16   Ht 1.575 m (5' 2\")   Wt 47 kg (103 lb 11.2 oz)   SpO2 97%  Body mass index is 18.97 kg/m².    Physical Exam:  Physical Exam   Constitutional: She is oriented to person, place, and time and well-developed, well-nourished, and in no distress.   HENT:   Head: Normocephalic.   Right Ear: External ear normal.   Left Ear: External ear normal.   Eyes: Pupils are equal, round, and reactive to light.   Cardiovascular: Normal rate, regular rhythm and normal heart sounds.   Pulmonary/Chest: Effort normal and breath sounds normal.   Musculoskeletal: Normal range of motion.      Left shoulder: She exhibits normal range of motion, no tenderness, no swelling, no effusion and normal strength.      Left elbow: She exhibits normal range of motion, no swelling and no effusion. No tenderness found.      Comments: Mild effusion over the left ulnar styloid. No ecchymosis or erythema. Bony tenderness over the left ulnar styloid and base of the 4th MC.   She exhibits normal ROM. Pain elicited with wrist extension, radial deviation and supination of the forearm.    Neurological: She is alert and oriented to person, place, and time. She has normal sensation and normal strength. Gait normal.   Difficulty/pain with thumb opposition. Neurovascularly intact.    Skin: Skin is warm and dry.   Vitals reviewed.      Assessment and Plan:     The following treatment plan was discussed:    1. Left wrist pain  - This is an acute condition that has worsened over the past 2 weeks.   - Focal bony tenderness, mild swelling and pain with ROM of the left wrist was elicited on exam concerning for acute fracture.   - DX-WRIST-COMPLETE 3+ LEFT; Future  - Left wrist brace for mobilization.   - Rest, Ice, Compression and Elevation.   - Tylenol or IBU PRN.   - Cyclobenzaprine " (FLEXERIL) 5 MG tablet; Take 1 Tab by mouth before bedtime  Dispense: 30 Tab; Refill: 0  - I will notify patient via MyChart with XR results.   - If pain persists, worsens or new symptoms arise, report to ED.  - Patient understands and agrees to the plan.       3. Unintentional weight loss  - This is a chronic condition.   - Weight loss has continued despite efforts to gain weight.   - She typically is in a hypo caloric state. Therefore, I have encouraged her to start logging her daily caloric intake and output in an effort to ensure that her caloric intake is higher than the amount of calories she burns in a day.   - Bring log to next follow up.  - Complete labs prior to f/u appointment.   - Comp Metabolic Panel; Future  - HEMOGLOBIN A1C; Future  - URINALYSIS; Future  - HEP C VIRUS ANTIBODY; Future  - Sed Rate; Future  - CRP HIGH SENSITIVE (CARDIAC); Future       Any change or worsening of signs or symptoms, patient encouraged to follow-up or report to emergency room for further evaluation. Patient verbalizes understanding and agrees.    Follow-Up: Return in about 4 weeks (around 5/22/2020) for F/u labs.      PLEASE NOTE: This dictation was created using voice recognition software. I have made every reasonable attempt to correct obvious errors, but I expect that there are errors of grammar and possibly content that I did not discover before finalizing the note.

## 2020-04-24 NOTE — ASSESSMENT & PLAN NOTE
"Abby reports that she has continued to lose weight despite implementing protein shakes. She states that in November of last year she was weighing somewhere in the mid 140's and in just 2 short months lost 20 pounds. She reports that despite her efforts to gain weight, she has continued to lose weight. She states that \"it hasn't been a big number, but I am still losing weight\". She reports that when she is eating she is typically in a hypo caloric state.   "

## 2020-04-24 NOTE — PATIENT INSTRUCTIONS
Rest   Ice   Compression  Elevation      mg 3-4 times daily (do not exceed 3,200 mg/day).  Tylenol 325-650 mg 3-4 daily (do not exceed 4,000 mg/day).  Take 1 muscle relaxant tab by mouth before bed time.     Keep a log of calories consumed and calories burned.

## 2020-04-26 LAB
EST. AVERAGE GLUCOSE BLD GHB EST-MCNC: 117 MG/DL
HBA1C MFR BLD: 5.7 % (ref 0–5.6)

## 2020-04-28 ENCOUNTER — PATIENT MESSAGE (OUTPATIENT)
Dept: MEDICAL GROUP | Facility: MEDICAL CENTER | Age: 30
End: 2020-04-28

## 2020-04-28 DIAGNOSIS — N30.01 ACUTE CYSTITIS WITH HEMATURIA: ICD-10-CM

## 2020-04-28 NOTE — LETTER
[facility]  [address] [facility] [address]  [phone]     April 28, 2020    Patient: Abby Call   YOB: 1990   Date of Visit: 4/24/2020       To Whom It May Concern:    Abby Call was seen and treated in our department on 4/24/2020.  She has been cleared to attend work,however, it is imperative that Abby has work restrictions put in place for the next 2 weeks that include no lifting or carrying heavy objects/boxes.    If you have any questions or concerns please call the number listed above.    Sincerely,     Mine Rodgers P.A.-C.

## 2020-04-29 RX ORDER — SULFAMETHOXAZOLE AND TRIMETHOPRIM 800; 160 MG/1; MG/1
1 TABLET ORAL 2 TIMES DAILY
Qty: 6 TAB | Refills: 0 | Status: SHIPPED | OUTPATIENT
Start: 2020-04-29 | End: 2020-08-09

## 2020-05-28 ENCOUNTER — APPOINTMENT (OUTPATIENT)
Dept: URGENT CARE | Facility: PHYSICIAN GROUP | Age: 30
End: 2020-05-28
Payer: OTHER MISCELLANEOUS

## 2020-05-28 ENCOUNTER — HOSPITAL ENCOUNTER (OUTPATIENT)
Dept: RADIOLOGY | Facility: MEDICAL CENTER | Age: 30
End: 2020-05-28
Attending: NURSE PRACTITIONER
Payer: OTHER MISCELLANEOUS

## 2020-05-28 ENCOUNTER — OCCUPATIONAL MEDICINE (OUTPATIENT)
Dept: URGENT CARE | Facility: PHYSICIAN GROUP | Age: 30
End: 2020-05-28
Payer: OTHER MISCELLANEOUS

## 2020-05-28 VITALS
OXYGEN SATURATION: 100 % | HEART RATE: 66 BPM | WEIGHT: 101 LBS | BODY MASS INDEX: 18.58 KG/M2 | RESPIRATION RATE: 16 BRPM | SYSTOLIC BLOOD PRESSURE: 100 MMHG | DIASTOLIC BLOOD PRESSURE: 58 MMHG | TEMPERATURE: 97 F | HEIGHT: 62 IN

## 2020-05-28 DIAGNOSIS — S66.912A STRAIN OF LEFT WRIST, INITIAL ENCOUNTER: ICD-10-CM

## 2020-05-28 DIAGNOSIS — M25.532 PAIN, WRIST, LEFT: ICD-10-CM

## 2020-05-28 PROCEDURE — 99213 OFFICE O/P EST LOW 20 MIN: CPT | Performed by: NURSE PRACTITIONER

## 2020-05-28 ASSESSMENT — ENCOUNTER SYMPTOMS
CHILLS: 0
NAUSEA: 0
WEAKNESS: 0
FALLS: 0
SENSORY CHANGE: 0
TREMORS: 0
FEVER: 0
MYALGIAS: 0
FOCAL WEAKNESS: 0
TINGLING: 0
VOMITING: 0

## 2020-05-28 ASSESSMENT — FIBROSIS 4 INDEX: FIB4 SCORE: 0.53

## 2020-05-28 NOTE — LETTER
"EMPLOYEE’S CLAIM FOR COMPENSATION/ REPORT OF INITIAL TREATMENT  FORM C-4    EMPLOYEE’S CLAIM - PROVIDE ALL INFORMATION REQUESTED   First Name  Abby Last Name  Jakub Birthdate                    1990                Sex  female Claim Number   Home Address  [address] Age  29 y.o. Height  1.575 m (5' 2\") Weight  45.8 kg (101 lb) [facility]     [facility] Zip  [address] Telephone  [phone] (home)    Mailing Address  [address] [facility] Zip  [address] Primary Language Spoken  English    Insurer   Third Party   [facility] Workcomp   Employee's Occupation (Job Title) When Injury or Occupational Disease Occurred      Employer's Name  [employer]  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 (Be specific an answer in detail. Use additional sheet if necessary)  Hit wrist on metal piece of door exiting vehicle   If you believe that you have an occupational disease, when did you first have knowledge of the disability and it relationship to your employment?  N/A Witnesses to the Accident  N/A      Nature of Injury or Occupational Disease  Workers' Compensation  Part(s) of Body Injured or Affected  Wrist (L) and Hand (L), N/A, N/A    I certify that the above is true and correct to the best of my knowledge and that I have provided this information in order to obtain the benefits of [place] " [place]  I hereby authorize any physician, chiropractor, surgeon, practitioner, or other person, any hospital, including [facility] hospital, any medical service organization, any insurance company, or other institution or organization to release to each other, any medical or other information, including benefits paid or payable, pertinent to this injury or disease, except information relative to diagnosis, treatment and/or counseling for AIDS, psychological conditions, alcohol or controlled substances, for which I must give specific authorization.  A Photostat of this authorization shall be as valid as the original.     Date   Place   Employee’s Signature   THIS REPORT MUST BE COMPLETED AND MAILED WITHIN 3 WORKING DAYS OF TREATMENT   Place  [facility]  Name of Facility  [facility]   Date  5/28/2020 Diagnosis  (M25.532) Pain, wrist, left  (S66.912A) Strain of left wrist, initial encounter Is there evidence the injured employee was under the influence of alcohol and/or another controlled substance at the time of accident?   Hour  2:37 PM Description of Injury or Disease  Diagnoses of Pain, wrist, left and Strain of left wrist, initial encounter were pertinent to this visit. No   Treatment  She will follow current work restrictions, rest, apply ice for 20 minutes three times daily, and elevated prn pain. Pt instructed to use Tylenol 500 mg every four hours or Ibuprofen 600 mg every six hours as needed for relief of pain.    Have you advised the patient to remain off work five days or more? No   X-Ray Findings    Comments:x ray not performed    If Yes   From Date  To Date      From information given by the employee, together with medical evidence, can you directly connect this injury or occupational disease as job incurred?  Yes If No Full Duty No Modified Duty  Yes   Is additional medical care by a physician indicated?  Yes If  "Modified Duty, Specify any Limitations / Restrictions  Please see D-39   Do you know of any previous injury or disease contributing to this condition or occupational disease?                            Yes  Comments:She was seen by PCP for left wrist pain on 4/24/2020.    Date  5/28/2020 Print Doctor’s Name KAREN Castellanos I certify the employer’s copy of  this form was mailed on:   Address  [address] Insurer’s Use Only     [facility] Zip  [address]    Provider’s Tax ID Number  [id number] Telephone  Dept: [phone]            e-Signature: Dr. Sonu Khoury,   Medical Director Degree  MD        ORIGINAL-TREATING PHYSICIAN OR CHIROPRACTOR    PAGE 2-INSURER/TPA    PAGE 3-EMPLOYER    PAGE 4-EMPLOYEE             Form C-4 (rev.10/07)              BRIEF DESCRIPTION OF RIGHTS AND BENEFITS  (Pursuant to [place] 616C.050)    Notice of Injury or Occupational Disease (Incident Report Form C-1): If an injury or occupational disease (OD) arises out of and in the course of employment, you must provide written notice to your employer as soon as practicable, but no later than 7 days after the accident or OD. Your employer shall maintain a sufficient supply of the required forms.    Claim for Compensation (Form C-4): If medical treatment is sought, the form C-4 is available at the place of initial treatment. A completed \"Claim for Compensation\" (Form C-4) must be filed within 90 days after an accident or OD. The treating physician or chiropractor must, within 3 working days after treatment, complete and mail to the employer, the employer's insurer and third-party , the Claim for Compensation.    Medical Treatment: If you require medical treatment for your on-the-job injury or OD, you may be required to select a physician or chiropractor from a list provided by your workers’ compensation insurer, if it has contracted with an Organization for Managed Care (MCO) or Preferred Provider " Organization (PPO) or providers of health care. If your employer has not entered into a contract with an MCO or PPO, you may select a physician or chiropractor from the Panel of Physicians and Chiropractors. Any medical costs related to your industrial injury or OD will be paid by your insurer.    Temporary Total Disability (TTD): If your doctor has certified that you are unable to work for a period of at least 5 consecutive days, or 5 cumulative days in a 20-day period, or places restrictions on you that your employer does not accommodate, you may be entitled to TTD compensation.    Temporary Partial Disability (TPD): If the wage you receive upon reemployment is less than the compensation for TTD to which you are entitled, the insurer may be required to pay you TPD compensation to make up the difference. TPD can only be paid for a maximum of 24 months.    Permanent Partial Disability (PPD): When your medical condition is stable and there is an indication of a PPD as a result of your injury or OD, within 30 days, your insurer must arrange for an evaluation by a rating physician or chiropractor to determine the degree of your PPD. The amount of your PPD award depends on the date of injury, the results of the PPD evaluation and your age and wage.    Permanent Total Disability (PTD): If you are medically certified by a treating physician or chiropractor as permanently and totally disabled and have been granted a PTD status by your insurer, you are entitled to receive monthly benefits not to exceed 66 2/3% of your average monthly wage. The amount of your PTD payments is subject to reduction if you previously received a PPD award.    Vocational Rehabilitation Services: You may be eligible for vocational rehabilitation services if you are unable to return to the job due to a permanent physical impairment or permanent restrictions as a result of your injury or occupational disease.    Transportation and Per Charley  Reimbursement: You may be eligible for travel expenses and per bartolome associated with medical treatment.    Reopening: You may be able to reopen your claim if your condition worsens after claim closure.    Appeal Process: If you disagree with a written determination issued by the insurer or the insurer does not respond to your request, you may appeal to the Department of Administration, , by following the instructions contained in your determination letter. You must appeal the determination within 70 days from the date of the determination letter at [address], [address], [place], [place] [address], or [address], [place], [place] [address]. If you disagree with the  decision, you may appeal to the Department of Administration, . You must file your appeal within 30 days from the date of the  decision letter at [address], [address], [place], [place] [address], or [address] [facility] [address], [place], [place] [address]. If you disagree with a decision of an , you may file a petition for judicial review with the District Court. You must do so within 30 days of the Appeal Officer’s decision. You may be represented by an  at your own expense or you may contact the [facility] for possible representation.    [place]  for Injured Workers (NAIW): If you disagree with a  decision, you may request that NAIW represent you without charge at an  Hearing. For information regarding denial of benefits, you may contact the [facility] at: [address], [address], [place] [address], [phone]) [phone], or [address] [facility] [address] [address], [phone]    To File a Complaint with the Division: If you wish to file a complaint with the  of the Division of Industrial Relations (DIR),  please contact the Workers’ Compensation Section, [address] [place] [address]  [place], [address], [place], [place] [address], telephone ([phone]) [phone], or [address], [address], [place], [place] [address], telephone [phone]) [phone].    For assistance with Workers’ Compensation Issues: You may contact the Office of the Governor Consumer Health Assistance, [address] [place] [address], [address], [place], [place] [address], Toll Free 1-278.885.9837, Web site: http://govcha.[facility].nv., E-mail pierre@[email or web address].                   __________________________________________________________________                                                     _________        Employee Name / Signature                                                                                                                                              Date                                                                                                                                                                                                     D-2 (rev. 06/18)

## 2020-05-28 NOTE — LETTER
[facility] [address] [address]  Phone:  [phone] - Fax:  [phone]   [facility] Progress Report and Disability Certification  Date of Service: 5/28/2020   No Show:  No  Date / Time of Next Visit: 6/4/2020   Claim Information   Patient Name: Abby Call  Claim Number:     Employer: [employer]  Date of Injury: 5/27/2020     Insurer / TPA: [employer]  ID / SSN:     Occupation:   Diagnosis: Diagnoses of Pain, wrist, left and Strain of left wrist, initial encounter were pertinent to this visit.    Medical Information   Related to Industrial Injury? Yes    Subjective Complaints:  Pt presents for evaluation of a new work comp issue. DOI: 5/27/2020. She was getting out of her work truck (she is a  for [employer]) and as she turned, her left wrist slammed into a metal piece of the door frame. She immediately had 10/10 pain following this injury. 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 Her pain is slightly better today with a rating of 7/10. She has not used any medication for the relief of her symptoms. She denies any swelling. She does have a bruise on her anterior wrist and decreased ROM due to pain. She denies any other employment. She has been experiencing recent left wrist pain that she believes is due to constant lifting at her job. She was seen by PCP on 4/24/2020 where an x ray was performed. She was told that her wrist was sprained and given a wrist brace and work restrictions. She states she had limited relief with use of brace. X ray performed on 4/24 was negative for fracture.      Objective Findings:    General: Tenderness and signs of injury present. No swelling or deformity.      Left forearm: She exhibits tenderness. She exhibits no swelling, no edema and no deformity.        Arms:  Comments: Small bruise to left anterior  wrist. Negative for swelling or edema. Positive for pain to palpation and with left wrist extension.  Bilateral strength 5/5     Pre-Existing Condition(s): Left wrist pain 4/24/2020 related to repetitive movements. Negative x ray.    Assessment:   Initial Visit    Status: Additional Care Required  Permanent Disability:No    Plan:      Diagnostics: Other (see comment)    Comments:  Possible x ray or PT at follow up appointment if no improvement.     Disability Information   Status: Released to Restricted Duty    From:  5/28/2020  Through: 6/4/2020 Restrictions are: Temporary   Physical Restrictions   Sitting:    Standing:    Stooping:    Bending:      Squatting:    Walking:    Climbing:    Pushing:  < or = to 1 hr/day  Comments:no pushing boxes or material >10 lbs   Pulling:  < or = to 1 hr/day  Comments:no pulling boxes >10 lbs Other:    Reaching Above Shoulder (L):   Reaching Above Shoulder (R):       Reaching Below Shoulder (L):    Reaching Below Shoulder (R):      Not to exceed Weight Limits   Carrying(hrs):   Comments:< =8 hours  Weight Limit(lb): < or = to 10 pounds Lifting(hrs):   Comments:< =8 hours Weight  Limit(lb): < or = to 10 pounds   Comments: New fitted left wrist brace given. She was instructed to rest, ice, compress and elevate for relief. Pt instructed to use Tylenol 500 mg every four hours or Ibuprofen 600 mg every six hours as needed for relief of pain. Follow up in 7 days in clinic or sooner for worsening s/s.       Repetitive Actions   Hands: i.e. Fine Manipulations from Grasping:   Comments:< or = 8 hours per day   Feet: i.e. Operating Foot Controls:     Driving / Operate Machinery:     Physician Name: KAREN Castellanos Physician Signature:   e-Signature: Dr. Sonu Khoury, Medical Director   Clinic Name / Location: [facility] [address] [address] Clinic Phone Number: Dept: [phone]   Appointment Time: 2:35 Pm Visit Start Time: 2:37 PM      Check-In Time:  2:34 Pm Visit Discharge Time:  4:17 PM   Original-Treating Physician or Chiropractor    Page 2-Insurer/TPA    Page 3-Employer    Page 4-Employee

## 2020-05-28 NOTE — LETTER
[facility] [address] [address]  Phone:  [phone] - Fax:  [phone]   [facility] Progress Report and Disability Certification  Date of Service: 5/28/2020   No Show:  No  Date / Time of Next Visit: 6/4/2020   Claim Information   Patient Name: Abby Call  Claim Number:     Employer: [employer]  Date of Injury: 5/27/2020     Insurer / TPA: [employer]  ID / SSN:     Occupation:   Diagnosis: Diagnoses of Pain, wrist, left and Strain of left wrist, initial encounter were pertinent to this visit.    Medical Information   Related to Industrial Injury? Yes    Subjective Complaints:  Pt presents for evaluation of a new work comp issue. DOI: 5/27/2020. She was getting out of her work truck (she is a  for [employer]) and as she turned, her left wrist slammed into a metal piece of the door frame. She immediately had 10/10 pain following this injury. 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 Her pain is slightly better today with a rating of 7/10. She has not used any medication for the relief of her symptoms. She denies any swelling. She does have a bruise on her anterior wrist and decreased ROM due to pain. She denies any other employment. She has been experiencing recent left wrist pain that she believes is due to constant lifting at her job. She was seen by PCP on 4/24/2020 where an x ray was performed. She was told that her wrist was sprained and given a wrist brace and work restrictions. She states she had limited relief with use of brace. X ray performed on 4/24 was negative for fracture.      Objective Findings:    General: Tenderness and signs of injury present. No swelling or deformity.      Left forearm: She exhibits tenderness. She exhibits no swelling, no edema and no deformity.        Arms:  Comments: Small bruise to left anterior  wrist. Negative for swelling or edema. Positive for pain to palpation and with left wrist extension.  Bilateral strength 5/5     Pre-Existing Condition(s): Left wrist pain 4/24/2020 related to repetitive movements. Negative x ray.    Assessment:   Initial Visit    Status: Additional Care Required  Permanent Disability:No    Plan:      Diagnostics: Other (see comment)    Comments:  Possible x ray or PT at follow up appointment if no improvement.     Disability Information   Status: Released to Restricted Duty    From:  5/28/2020  Through: 6/4/2020 Restrictions are: Temporary   Physical Restrictions   Sitting:    Standing:    Stooping:    Bending:      Squatting:    Walking:    Climbing:    Pushing:  < or = to 1 hr/day  Comments:no pushing boxes or material >10 lbs   Pulling:  < or = to 1 hr/day  Comments:no pulling boxes >10 lbs Other:    Reaching Above Shoulder (L):   Reaching Above Shoulder (R):       Reaching Below Shoulder (L):    Reaching Below Shoulder (R):      Not to exceed Weight Limits   Carrying(hrs):   Comments:< =8 hours  Weight Limit(lb): < or = to 10 pounds Lifting(hrs):   Comments:< =8 hours Weight  Limit(lb): < or = to 10 pounds   Comments: New fitted left wrist brace given. She was instructed to rest, ice, compress and elevate for relief. Pt instructed to use Tylenol 500 mg every four hours or Ibuprofen 600 mg every six hours as needed for relief of pain. Follow up in 7 days in clinic or sooner for worsening s/s.       Repetitive Actions   Hands: i.e. Fine Manipulations from Grasping:   Comments:< or = 8 hours per day   Feet: i.e. Operating Foot Controls:     Driving / Operate Machinery:     Physician Name: KAREN Castellanos Physician Signature:   e-Signature: Dr. Sonu Khoury, Medical Director   Clinic Name / Location: [facility] [address] [address] Clinic Phone Number: Dept: [phone]   Appointment Time: 2:35 Pm Visit Start Time: 2:37 PM      Check-In Time:  2:34 Pm Visit Discharge Time:     Original-Treating Physician or Chiropractor    Page 2-Insurer/TPA    Page 3-Employer    Page 4-Employee

## 2020-05-28 NOTE — PROGRESS NOTES
Subjective:     Abby Call is a 29 y.o. female who presents for Work-Related Injury (Lt wrist injury, swelling/bruising x2days )      HPI  Pt presents for evaluation of a new work comp issue. DOI: 5/27/2020. She was getting out of her work truck (she is a  for [employer]) and as she turned, her left wrist slammed into a metal piece of the door frame. She immediately had 10/10 pain following this injury. 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 Her pain is slightly better today with a rating of 7/10. She has not used any medication for the relief of her symptoms. She denies any swelling. She does have a bruise on her anterior wrist and decreased ROM due to pain. She denies any other employment. She has been experiencing recent left wrist pain that she believes is due to constant lifting at her job. She was seen by PCP on 4/24/2020 where an x ray was performed. She was told that her wrist was sprained and given a wrist brace and work restrictions. She states she had limited relief with use of brace. X ray performed on 4/24 was negative for fracture.     Review of Systems   Constitutional: Negative for chills and fever.   Gastrointestinal: Negative for nausea and vomiting.   Musculoskeletal: Negative for falls and myalgias.   Neurological: Negative for tingling, tremors, sensory change, focal weakness and weakness.       PMH:   Past Medical History:   Diagnosis Date   • Anxiety and depression    • Migraine      ALLERGIES: No Known Allergies  SURGHX:   Past Surgical History:   Procedure Laterality Date   • PRIMARY C SECTION  01/31/2019     SOCHX:   Social History     Socioeconomic History   • Marital status: Single     Spouse name: Not on file   • Number of children: Not on file   • Years of education: Not on file   • Highest education level: Not on file   Occupational History   • Not on file   Social Needs   • Financial resource strain: Not on file  "  • Food insecurity     Worry: Not on file     Inability: Not on file   • Transportation needs     Medical: Not on file     Non-medical: Not on file   Tobacco Use   • Smoking status: Never Smoker   • Smokeless tobacco: Never Used   Substance and Sexual Activity   • Alcohol use: Yes     Alcohol/week: 4.2 oz     Types: 7 Glasses of wine per week     Drinks per session: 1 or 2     Comment: on the weekends   • Drug use: No   • Sexual activity: Not Currently     Partners: Male   Lifestyle   • Physical activity     Days per week: Not on file     Minutes per session: Not on file   • Stress: Not on file   Relationships   • Social connections     Talks on phone: Not on file     Gets together: Not on file     Attends [employer]ism service: Not on file     Active member of club or organization: Not on file     Attends meetings of clubs or organizations: Not on file     Relationship status: Not on file   • Intimate partner violence     Fear of current or ex partner: Not on file     Emotionally abused: Not on file     Physically abused: Not on file     Forced sexual activity: Not on file   Other Topics Concern   • Not on file   Social History Narrative   • Not on file     FH:   Family History   Problem Relation Age of Onset   • Cancer Father    • Cancer Maternal Aunt         Breast cancer   • Cancer Maternal Grandmother         Breast cancer   • Arthritis Maternal Grandmother    • Cancer Paternal Grandfather         Prostate CA         Objective:   /58   Pulse 66   Temp 36.1 °C (97 °F) (Temporal)   Resp 16   Ht 1.575 m (5' 2\")   Wt 45.8 kg (101 lb)   SpO2 100%   Breastfeeding No   BMI 18.47 kg/m²     Physical Exam  Vitals signs and nursing note reviewed.   Constitutional:       General: She is not in acute distress.     Appearance: Normal appearance. She is not ill-appearing.   HENT:      Head: Normocephalic and atraumatic.      Right Ear: External ear normal.      Left Ear: External ear normal.      Nose: No " congestion or rhinorrhea.      Mouth/Throat:      Mouth: Mucous membranes are moist.   Eyes:      Extraocular Movements: Extraocular movements intact.      Pupils: Pupils are equal, round, and reactive to light.   Neck:      Musculoskeletal: Normal range of motion and neck supple.   Cardiovascular:      Rate and Rhythm: Normal rate and regular rhythm.      Pulses: Normal pulses.      Heart sounds: Normal heart sounds.   Pulmonary:      Effort: Pulmonary effort is normal.      Breath sounds: Normal breath sounds.   Abdominal:      General: Abdomen is flat. Bowel sounds are normal.      Palpations: Abdomen is soft.      Tenderness: There is no right CVA tenderness or left CVA tenderness.   Musculoskeletal: Normal range of motion.         General: Tenderness and signs of injury present. No swelling or deformity.      Left forearm: She exhibits tenderness. She exhibits no swelling, no edema and no deformity.        Arms:       Right lower leg: No edema.      Left lower leg: No edema.      Comments: Small bruise to left anterior wrist. Negative for swelling or edema. Positive for pain to palpation and with left wrist extension.    Skin:     General: Skin is warm and dry.      Capillary Refill: Capillary refill takes less than 2 seconds.   Neurological:      General: No focal deficit present.      Mental Status: She is alert and oriented to person, place, and time. Mental status is at baseline.   Psychiatric:         Mood and Affect: Mood normal.         Behavior: Behavior normal.         Thought Content: Thought content normal.         Judgment: Judgment normal.         Assessment/Plan:   Assessment      1. Pain, wrist, left  DX-WRIST-COMPLETE 3+ LEFT   2. Strain of left wrist, initial encounter     We discussed differential diagnoses including sprain, strain, tendinopathy or bruising.   Please see work comp paperwork for restrictions. New left wrist brace given in office as previous brace was too large. She was  instructed to rest, ice, compress and elevate left arm for comfort. Pt instructed to use Tylenol 500 mg every four hours or Ibuprofen 600 mg every six hours as needed for relief of pain. She will follow up in clinic for evaluation in 7 days or sooner for worsening s/s.

## 2020-06-03 ENCOUNTER — OCCUPATIONAL MEDICINE (OUTPATIENT)
Dept: URGENT CARE | Facility: PHYSICIAN GROUP | Age: 30
End: 2020-06-03
Payer: OTHER MISCELLANEOUS

## 2020-06-03 VITALS
BODY MASS INDEX: 18.51 KG/M2 | OXYGEN SATURATION: 99 % | DIASTOLIC BLOOD PRESSURE: 62 MMHG | HEART RATE: 72 BPM | SYSTOLIC BLOOD PRESSURE: 100 MMHG | RESPIRATION RATE: 20 BRPM | TEMPERATURE: 98.5 F | HEIGHT: 62 IN | WEIGHT: 100.6 LBS

## 2020-06-03 DIAGNOSIS — M25.532 PAIN, WRIST, LEFT: ICD-10-CM

## 2020-06-03 DIAGNOSIS — S66.912D STRAIN OF LEFT WRIST, SUBSEQUENT ENCOUNTER: ICD-10-CM

## 2020-06-03 PROCEDURE — 99213 OFFICE O/P EST LOW 20 MIN: CPT | Performed by: NURSE PRACTITIONER

## 2020-06-03 ASSESSMENT — FIBROSIS 4 INDEX: FIB4 SCORE: 0.53

## 2020-06-03 ASSESSMENT — ENCOUNTER SYMPTOMS
WEAKNESS: 0
SENSORY CHANGE: 0
TINGLING: 0

## 2020-06-03 ASSESSMENT — PAIN SCALES - GENERAL: PAINLEVEL: 4=SLIGHT-MODERATE PAIN

## 2020-06-03 NOTE — LETTER
[facility] [address] [address]  Phone:  [phone] - Fax:  [phone]   [facility] Progress Report and Disability Certification  Date of Service: 6/3/2020   No Show:  No  Date / Time of Next Visit: 6/10/2020   Claim Information   Patient Name: Abby Call  Claim Number:     Employer: [employer]  Date of Injury: 5/27/2020     Insurer / TPA: [employer]  ID / SSN:     Occupation:   Diagnosis: Diagnoses of Strain of left wrist, subsequent encounter and Pain, wrist, left were pertinent to this visit.    Medical Information   Related to Industrial Injury? Yes    Subjective Complaints:  HPI  Pt presents for evaluation of a new work comp issue. DOI: 5/27/2020. She was getting out of her work truck (she is a  for [employer]) and as she turned, her left wrist slammed into a metal piece of the door frame. She immediately had 10/10 pain following this injury. 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 Her pain is slightly better today with a rating of 7/10. She has not used any medication for the relief of her symptoms. She denies any swelling. She does have a bruise on her anterior wrist and decreased ROM due to pain. She denies any other employment. She has been experiencing recent left wrist pain that she believes is due to constant lifting at her job. She was seen by PCP on 4/24/2020 where an x ray was performed. She was told that her wrist was sprained and given a wrist brace and work restrictions. She states she had limited relief with use of brace. X ray performed on 4/24 was negative for fracture.     FUV #2 06/03/2020: Patient states that her pain is exactly the same.  Is currently working 8-hour shifts with a 10 pound weight restriction.  Is using her wrist brace at all times during work but does take it off at home.  Is unable to take  ibuprofen due to gastrointestinal upset as well as Tylenol but is been making her vomit over the last week.  Patient states that her bruises completely healed but she feels that there is a constant ache in her left wrist.  Denies any numbness or tingling.    Review of Systems   Musculoskeletal: Positive for joint pain.   Neurological: Negative for tingling, sensory change and weakness.      Objective Findings: Physical Exam  Vitals signs reviewed.   Constitutional:       Appearance: Normal appearance.   Cardiovascular:      Rate and Rhythm: Normal rate and regular rhythm.      Heart sounds: Normal heart sounds.   Pulmonary:      Effort: Pulmonary effort is normal.      Breath sounds: Normal breath sounds.   Musculoskeletal: Normal range of motion.         General: Tenderness present. No swelling or deformity.      Comments: Left wrist:  - TTP distal ulna  - no pain with ROM   Skin:     General: Skin is warm.      Capillary Refill: Capillary refill takes less than 2 seconds.   Neurological:      Mental Status: She is alert and oriented to person, place, and time.   Psychiatric:         Mood and Affect: Mood normal.         Behavior: Behavior normal.         Thought Content: Thought content normal.         Judgment: Judgment normal.        Pre-Existing Condition(s):     Assessment:   Condition Same    Status: Additional Care Required  Permanent Disability:No    Plan:   Comments:Diclofenac gel, ice and wearing wrist brace as much as possible.    Diagnostics:      Comments:       Disability Information   Status: Released to Restricted Duty    From:  6/3/2020  Through: 6/10/2020 Restrictions are: Temporary   Physical Restrictions   Sitting:    Standing:    Stooping:    Bending:      Squatting:    Walking:    Climbing:    Pushing:      Pulling:    Other:    Reaching Above Shoulder (L):   Reaching Above Shoulder (R):       Reaching Below Shoulder (L):    Reaching Below Shoulder (R):      Not to exceed Weight Limits      Carrying(hrs):   Weight Limit(lb):   Lifting(hrs):   Weight  Limit(lb):     Comments: Patient is to be limited to an 8-hour workday.  She is not to be lifting over 10 pounds with her left arm.    Repetitive Actions   Hands: i.e. Fine Manipulations from Grasping:     Feet: i.e. Operating Foot Controls:     Driving / Operate Machinery:     Physician Name: KAREN Madison Physician Signature:   e-Signature: Dr. Sonu Khoury, Medical Director   Clinic Name / Location: [address] [address] Clinic Phone Number: Dept: [phone]   Appointment Time: 5:30 Pm Visit Start Time: 6:05 PM   Check-In Time:  5:55 Pm Visit Discharge Time:     Original-Treating Physician or Chiropractor    Page 2-Insurer/TPA    Page 3-Employer    Page 4-Employee

## 2020-06-04 NOTE — PROGRESS NOTES
Subjective:      Abby Call is a 29 y.o. female who presents with Work-Related Injury (05/27/2020 (L) wrist )      HPI  Pt presents for evaluation of a new work comp issue. DOI: 5/27/2020. She was getting out of her work truck (she is a  for [employer]) and as she turned, her left wrist slammed into a metal piece of the door frame. She immediately had 10/10 pain following this injury. 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 Her pain is slightly better today with a rating of 7/10. She has not used any medication for the relief of her symptoms. She denies any swelling. She does have a bruise on her anterior wrist and decreased ROM due to pain. She denies any other employment. She has been experiencing recent left wrist pain that she believes is due to constant lifting at her job. She was seen by PCP on 4/24/2020 where an x ray was performed. She was told that her wrist was sprained and given a wrist brace and work restrictions. She states she had limited relief with use of brace. X ray performed on 4/24 was negative for fracture.     FUV #2 06/03/2020: Patient states that her pain is exactly the same.  Is currently working 8-hour shifts with a 10 pound weight restriction.  Is using her wrist brace at all times during work but does take it off at home.  Is unable to take ibuprofen due to gastrointestinal upset as well as Tylenol but is been making her vomit over the last week.  Patient states that her bruises completely healed but she feels that there is a constant ache in her left wrist.  Denies any numbness or tingling.     HPI    Review of Systems   Musculoskeletal: Positive for joint pain.   Neurological: Negative for tingling, sensory change and weakness.     PMH: No pertinent past medical history to this problem  MEDS: Medications were reviewed in Epic  ALLERGIES: Allergies were reviewed in Epic  SOCHX: Works as a   FH: No  "pertinent family history to this problem            Objective:     /62 (BP Location: Left arm, Patient Position: Sitting, BP Cuff Size: Adult)   Pulse 72   Temp 36.9 °C (98.5 °F) (Temporal)   Resp 20   Ht 1.575 m (5' 2\")   Wt 45.6 kg (100 lb 9.6 oz)   SpO2 99%   BMI 18.40 kg/m²      Physical Exam  Vitals signs reviewed.   Constitutional:       Appearance: Normal appearance.   Cardiovascular:      Rate and Rhythm: Normal rate and regular rhythm.      Heart sounds: Normal heart sounds.   Pulmonary:      Effort: Pulmonary effort is normal.      Breath sounds: Normal breath sounds.   Musculoskeletal: Normal range of motion.         General: Tenderness present. No swelling or deformity.      Comments: Left wrist:  - TTP distal ulna  - no pain with ROM   Skin:     General: Skin is warm.      Capillary Refill: Capillary refill takes less than 2 seconds.   Neurological:      Mental Status: She is alert and oriented to person, place, and time.   Psychiatric:         Mood and Affect: Mood normal.         Behavior: Behavior normal.         Thought Content: Thought content normal.         Judgment: Judgment normal.                 Assessment/Plan:       1. Strain of left wrist, subsequent encounter    2. Pain, wrist, left  - Diclofenac Sodium 1 % Gel; Apply one application to affected area up to three times per day as needed  Dispense: 1 Tube; Refill: 0    See D 39    FU in 7-10 days    "

## 2020-06-12 ENCOUNTER — OCCUPATIONAL MEDICINE (OUTPATIENT)
Dept: URGENT CARE | Facility: PHYSICIAN GROUP | Age: 30
End: 2020-06-12
Payer: OTHER MISCELLANEOUS

## 2020-06-12 ENCOUNTER — HOSPITAL ENCOUNTER (OUTPATIENT)
Dept: RADIOLOGY | Facility: MEDICAL CENTER | Age: 30
End: 2020-06-12
Attending: NURSE PRACTITIONER
Payer: OTHER MISCELLANEOUS

## 2020-06-12 ENCOUNTER — OFFICE VISIT (OUTPATIENT)
Dept: URGENT CARE | Facility: PHYSICIAN GROUP | Age: 30
End: 2020-06-12
Payer: COMMERCIAL

## 2020-06-12 VITALS
OXYGEN SATURATION: 97 % | HEART RATE: 88 BPM | RESPIRATION RATE: 16 BRPM | HEIGHT: 62 IN | DIASTOLIC BLOOD PRESSURE: 58 MMHG | BODY MASS INDEX: 18.4 KG/M2 | WEIGHT: 100 LBS | TEMPERATURE: 98 F | SYSTOLIC BLOOD PRESSURE: 102 MMHG

## 2020-06-12 VITALS
SYSTOLIC BLOOD PRESSURE: 100 MMHG | HEART RATE: 88 BPM | DIASTOLIC BLOOD PRESSURE: 58 MMHG | WEIGHT: 100 LBS | OXYGEN SATURATION: 97 % | RESPIRATION RATE: 16 BRPM | BODY MASS INDEX: 18.4 KG/M2 | TEMPERATURE: 98 F | HEIGHT: 62 IN

## 2020-06-12 DIAGNOSIS — S63.641A SPRAIN OF METACARPOPHALANGEAL (MCP) JOINT OF RIGHT THUMB, INITIAL ENCOUNTER: ICD-10-CM

## 2020-06-12 DIAGNOSIS — S66.912A STRAIN OF LEFT WRIST, INITIAL ENCOUNTER: ICD-10-CM

## 2020-06-12 PROCEDURE — 99214 OFFICE O/P EST MOD 30 MIN: CPT | Performed by: PHYSICIAN ASSISTANT

## 2020-06-12 PROCEDURE — 73140 X-RAY EXAM OF FINGER(S): CPT | Mod: RT

## 2020-06-12 ASSESSMENT — ENCOUNTER SYMPTOMS
ABDOMINAL PAIN: 0
SHORTNESS OF BREATH: 0
CHILLS: 0
TREMORS: 0
HEADACHES: 0
DIARRHEA: 0
VOMITING: 0
TINGLING: 0
BLURRED VISION: 0
ORTHOPNEA: 0
DOUBLE VISION: 0
SPEECH CHANGE: 0
DIZZINESS: 0
SENSORY CHANGE: 0
WEAKNESS: 0
FEVER: 0
NAUSEA: 0
CLAUDICATION: 0
LOSS OF CONSCIOUSNESS: 0
SEIZURES: 0
FOCAL WEAKNESS: 0
COUGH: 0
PALPITATIONS: 0

## 2020-06-12 ASSESSMENT — FIBROSIS 4 INDEX
FIB4 SCORE: 0.53
FIB4 SCORE: 0.53

## 2020-06-12 NOTE — LETTER
[facility] [address] [address]  Phone:  [phone] - Fax:  [phone]   [facility] Progress Report and Disability Certification  Date of Service: 6/12/2020   No Show:  No  Date / Time of Next Visit: 6/22/2020   Claim Information   Patient Name: Abby Call  Claim Number:     Employer: [employer]  Date of Injury: 5/27/2020     Insurer / TPA: [employer]  ID / SSN:     Occupation:   Diagnosis: The encounter diagnosis was Strain of left wrist, initial encounter.    Medical Information   Related to Industrial Injury? Yes    Subjective Complaints:  Initial HPI DOI: 5/27/2020. She was getting out of her work truck (she is a  for [employer]) and as she turned, her left wrist slammed into a metal piece of the door frame. She immediately had 10/10 pain following this injury. 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 Her pain is slightly better today with a rating of 7/10. She has not used any medication for the relief of her symptoms. She denies any swelling. She does have a bruise on her anterior wrist and decreased ROM due to pain. She denies any other employment. She has been experiencing recent left wrist pain that she believes is due to constant lifting at her job. She was seen by PCP on 4/24/2020 where an x ray was performed. She was told that her wrist was sprained and given a wrist brace and work restrictions. She states she had limited relief with use of brace. X ray performed on 4/24 was negative for fracture.      FUV #3 06/12/2020: Patient states that her pain is mildly improved.  Is currently working 8-hour shifts with a 10 pound weight restriction.  Is using her wrist brace at all times during work but does take it off at home.  Is unable to take ibuprofen due to gastrointestinal upset as well as Tylenol but is been making her  vomit over the last week.  P    Objective Findings: Constitutional: Pt is oriented to person, place, and time.  Appears well-developed and well-nourished. No distress.   HENT:   Mouth/Throat: Oropharynx is clear and moist.   Eyes: Conjunctivae are normal.   Cardiovascular: Normal rate.    Pulmonary/Chest: Effort normal.   Musculoskeletal: Full ROM of left wrist.  PTP of the wrist  Neurological: Pt is alert and oriented to person, place, and time. Coordination normal.   Skin: Skin is warm. Pt is not diaphoretic. No erythema.   Psychiatric: Pt has a normal mood and affect.  Behavior is normal.      Pre-Existing Condition(s):     Assessment:   Condition Improved    Status: Additional Care Required  Permanent Disability:No    Plan:      Diagnostics:      Comments:       Disability Information   Status: Released to Restricted Duty    From:  6/12/2020  Through: 6/22/2020 Restrictions are: Temporary   Physical Restrictions   Sitting:    Standing:    Stooping:    Bending:      Squatting:    Walking:    Climbing:    Pushing:      Pulling:    Other:    Reaching Above Shoulder (L):   Reaching Above Shoulder (R):       Reaching Below Shoulder (L):    Reaching Below Shoulder (R):      Not to exceed Weight Limits   Carrying(hrs):   Weight Limit(lb): < or = to 10 pounds Lifting(hrs):   Weight  Limit(lb): < or = to 10 pounds   Comments: Continue wrist brace at work.    Repetitive Actions   Hands: i.e. Fine Manipulations from Grasping:     Feet: i.e. Operating Foot Controls:     Driving / Operate Machinery:     Physician Name: Andi Rodriguez P.A.-C. Physician Signature: ANDI Garcia P.A.-C. e-Signature: Dr. Sonu Khoury, Medical Director   Clinic Name / Location: [facility] [address] [address] Clinic Phone Number: Dept: [phone]   Appointment Time: 5:30 Pm Visit Start Time: 5:00 PM   Check-In Time:  4:52 Pm Visit Discharge Time:  6:20 PM   Original-Treating Physician  or Chiropractor    Page 2-Insurer/TPA    Page 3-Employer    Page 4-Employee

## 2020-06-13 ASSESSMENT — ENCOUNTER SYMPTOMS
BLURRED VISION: 0
LOSS OF CONSCIOUSNESS: 0
SPEECH CHANGE: 0
SEIZURES: 0
FEVER: 0
CLAUDICATION: 0
PALPITATIONS: 0
HEADACHES: 0
DIZZINESS: 0
WEAKNESS: 0
ABDOMINAL PAIN: 0
CHILLS: 0
SENSORY CHANGE: 0
SHORTNESS OF BREATH: 0
FOCAL WEAKNESS: 0
DIARRHEA: 0
TREMORS: 0
VOMITING: 0
COUGH: 0
ORTHOPNEA: 0
DOUBLE VISION: 0
TINGLING: 0
NAUSEA: 0

## 2020-06-13 NOTE — PROGRESS NOTES
"Subjective:      Abby Call is a 29 y.o. female who presents with Finger Injury (Rt thumb injury (non work related))            Hand Injury   This is a new problem. The current episode started in the past 7 days. The problem occurs constantly. Pertinent negatives include no abdominal pain, chest pain, chills, coughing, fever, headaches, nausea, rash, vomiting or weakness. Associated symptoms comments: Right thumb pain  . Exacerbated by: movement. She has tried nothing for the symptoms.       Review of Systems   Constitutional: Negative for chills and fever.   Eyes: Negative for blurred vision and double vision.   Respiratory: Negative for cough and shortness of breath.    Cardiovascular: Negative for chest pain, palpitations, orthopnea, claudication and leg swelling.   Gastrointestinal: Negative for abdominal pain, diarrhea, nausea and vomiting.   Musculoskeletal:        Thumb pain, right     Skin: Negative for rash.   Neurological: Negative for dizziness, tingling, tremors, sensory change, speech change, focal weakness, seizures, loss of consciousness, weakness and headaches.   All other systems reviewed and are negative.         Objective:     Blood Pressure 102/58   Pulse 88   Temperature 36.7 °C (98 °F) (Temporal)   Respiration 16   Height 1.575 m (5' 2\")   Weight 45.4 kg (100 lb)   Oxygen Saturation 97%   Body Mass Index 18.29 kg/m²      Physical Exam  Vitals signs reviewed.   Constitutional:       Appearance: Normal appearance. She is well-developed.   HENT:      Head: Normocephalic and atraumatic.   Neck:      Musculoskeletal: Normal range of motion and neck supple.   Cardiovascular:      Rate and Rhythm: Normal rate and regular rhythm.      Pulses: Normal pulses.      Heart sounds: Normal heart sounds.      Comments: Distal pulses intact in affected limb.  Cap refills are brisk < 2 seconds.  Pulmonary:      Effort: Pulmonary effort is normal.      Breath sounds: Normal breath sounds. "   Musculoskeletal:         General: Tenderness present.      Comments: Pain to palpation over the right thumb MCP joint.  No PTP over the anatomic snuff box.  Flexion and extension of digits/wrist in affected hand intact with minor decrease of ROM.  Soft compartments of the upper extremity.    Special Test:    Finkelstein test: N/A  Mallet finger/flexor test: Normal   strength: Diminished compared to unaffected side.   Skin:     General: Skin is warm and dry.      Findings: No bruising or erythema.      Comments: Skin intact.   Neurological:      General: No focal deficit present.      Mental Status: She is alert and oriented to person, place, and time. Mental status is at baseline.      Sensory: No sensory deficit.      Motor: No weakness.      Comments: Nerve Motor/Sensory test    Radial: Wrist extension w/ fingers vs resistance intact.  Two point discrimination of dorsum of thumb intact.    Median: OK sign, oppose thumb intact.   Two point discrimination of tips, middle, index, dorsum of thumb intact.    Ulnar: Spread fingers w/ resistance.   Two point discrimination of ulnar side of hand intact.                    Special tests:     Phalen Maneuver/Tinel sign: NA     Psychiatric:         Mood and Affect: Mood normal.         Behavior: Behavior normal.         Thought Content: Thought content normal.         Judgment: Judgment normal.            Xray: no fracture or dislocation per radiology       Assessment/Plan:       1. Sprain of metacarpophalangeal (MCP) joint of right thumb, initial encounter  - splint  - DX-FINGER(S) 2+ RIGHT; Future  - Diclofenac Sodium 1 % Gel; Apply 2-4 grams to painful joint 4 times daily as needed for pain.  Dispense: 1 Tube; Refill: 0    Differential diagnosis, natural history, supportive care discussed. Follow-up with primary care provider within 7-10 days, emergency room precautions discussed.  Patient and/or family appears understanding of information.  Handout and review of  patients diagnosis and treatment was discussed extensively.

## 2020-06-13 NOTE — PROGRESS NOTES
Subjective:      Abby Call is a 29 y.o. female who presents with Work-Related Injury (WC/FV Left wrist injury x2weeks )      HPI  Initial HPI DOI: 5/27/2020. She was getting out of her work truck (she is a  for [employer]) and as she turned, her left wrist slammed into a metal piece of the door frame. She immediately had 10/10 pain following this injury. 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 Her pain is slightly better today with a rating of 7/10. She has not used any medication for the relief of her symptoms. She denies any swelling. She does have a bruise on her anterior wrist and decreased ROM due to pain. She denies any other employment. She has been experiencing recent left wrist pain that she believes is due to constant lifting at her job. She was seen by PCP on 4/24/2020 where an x ray was performed. She was told that her wrist was sprained and given a wrist brace and work restrictions. She states she had limited relief with use of brace. X ray performed on 4/24 was negative for fracture.      FUV #3 06/12/2020: Patient states that her pain is mildly improved.  Is currently working 8-hour shifts with a 10 pound weight restriction.  Is using her wrist brace at all times during work but does take it off at home.  Is unable to take ibuprofen due to gastrointestinal upset as well as Tylenol but is been making her vomit over the last week.      Review of Systems   Constitutional: Negative for chills and fever.   Eyes: Negative for blurred vision and double vision.   Respiratory: Negative for cough and shortness of breath.    Cardiovascular: Negative for chest pain, palpitations, orthopnea, claudication and leg swelling.   Gastrointestinal: Negative for abdominal pain, diarrhea, nausea and vomiting.   Musculoskeletal:        Wrist pain, left   Skin: Negative for rash.   Neurological: Negative for dizziness, tingling, tremors, sensory change,  "speech change, focal weakness, seizures, loss of consciousness, weakness and headaches.   All other systems reviewed and are negative.    Pt PMH, SocHx, SurgHx, FamHx, Drug allergies and medications reviewed with pt/EPIC.  Family history reviewed, it is not pertinent to this complaint.       Objective:     Blood Pressure 100/58   Pulse 88   Temperature 36.7 °C (98 °F) (Temporal)   Respiration 16   Height 1.575 m (5' 2\")   Weight 45.4 kg (100 lb)   Oxygen Saturation 97%   Body Mass Index 18.29 kg/m²      Physical Exam    Constitutional: Pt is oriented to person, place, and time.  Appears well-developed and well-nourished. No distress.   HENT:   Mouth/Throat: Oropharynx is clear and moist.   Eyes: Conjunctivae are normal.   Cardiovascular: Normal rate.    Pulmonary/Chest: Effort normal.   Musculoskeletal: Full ROM of left wrist.  PTP of the wrist  Neurological: Pt is alert and oriented to person, place, and time. Coordination normal.   Skin: Skin is warm. Pt is not diaphoretic. No erythema.   Psychiatric: Pt has a normal mood and affect.  Behavior is normal.          Assessment/Plan:       1. Strain of left wrist, initial encounter  - D39        "

## 2020-06-23 ENCOUNTER — OCCUPATIONAL MEDICINE (OUTPATIENT)
Dept: URGENT CARE | Facility: PHYSICIAN GROUP | Age: 30
End: 2020-06-23
Payer: OTHER MISCELLANEOUS

## 2020-06-23 VITALS
HEART RATE: 60 BPM | TEMPERATURE: 98.9 F | BODY MASS INDEX: 19.32 KG/M2 | DIASTOLIC BLOOD PRESSURE: 68 MMHG | WEIGHT: 105 LBS | RESPIRATION RATE: 16 BRPM | HEIGHT: 62 IN | OXYGEN SATURATION: 99 % | SYSTOLIC BLOOD PRESSURE: 100 MMHG

## 2020-06-23 DIAGNOSIS — S66.912D STRAIN OF LEFT WRIST, SUBSEQUENT ENCOUNTER: ICD-10-CM

## 2020-06-23 PROCEDURE — 99213 OFFICE O/P EST LOW 20 MIN: CPT | Performed by: NURSE PRACTITIONER

## 2020-06-23 ASSESSMENT — FIBROSIS 4 INDEX: FIB4 SCORE: 0.53

## 2020-06-23 NOTE — LETTER
[facility] [address] [place] [address]  Phone:  [phone] - Fax:  [phone]   [facility] Progress Report and Disability Certification  Date of Service: 6/23/2020   No Show:  No  Date / Time of Next Visit: 7/6/2020   Claim Information   Patient Name: Abby Call  Claim Number:     Employer: [employer]  Date of Injury: 5/27/2020     Insurer / TPA: [employer]  ID / SSN:     Occupation:   Diagnosis: There were no encounter diagnoses.    Medical Information   Related to Industrial Injury? Yes    Subjective Complaints:  FUV #3 06/23/2020: Patient states that her pain continues to improve.  Is currently working 8-hour shifts with a 10 pound weight restriction, and feels ready to increase the amount of weight she can lift.  Is using her wrist brace at all times during work but does take it off at home.  Has been taking Ibuprofen,Tylenol and Voltaren gel.  She states the gel is most helpful for her.     Objective Findings: Musculoskeletal: Full ROM of left wrist.  PTP of the wrist noted, no edema.   Pre-Existing Condition(s):     Assessment:   Condition Improved    Status: Additional Care Required  Permanent Disability:No    Plan: MedicationTransfer Care    Diagnostics:      Comments:       Disability Information   Status: Released to Restricted Duty    From:  6/23/2020  Through: 7/6/2020 Restrictions are: Temporary   Physical Restrictions   Sitting:    Standing:    Stooping:    Bending:      Squatting:    Walking:    Climbing:    Pushing:      Pulling:    Other:    Reaching Above Shoulder (L):   Reaching Above Shoulder (R):       Reaching Below Shoulder (L):    Reaching Below Shoulder (R):      Not to exceed Weight Limits   Carrying(hrs):   Weight Limit(lb): < or = to 25 pounds Lifting(hrs):   Weight  Limit(lb): < or = to 25 pounds   Comments: Transfer care to Occupational Health    Repetitive Actions      Hands: i.e. Fine Manipulations from Grasping:     Feet: i.e. Operating Foot Controls:     Driving / Operate Machinery:     Physician Name: KAREN Pepper Physician Signature:   e-Signature: Dr. Sonu Khoury, Medical Director   Clinic Name / Location: [address] [address] Clinic Phone Number: Dept: [phone]   Appointment Time: 4:05 Pm Visit Start Time: 4:19 PM   Check-In Time:  4:02 Pm Visit Discharge Time:     Original-Treating Physician or Chiropractor    Page 2-Insurer/TPA    Page 3-Employer    Page 4-Employee

## 2020-06-26 ASSESSMENT — ENCOUNTER SYMPTOMS: MYALGIAS: 0

## 2020-06-26 NOTE — PROGRESS NOTES
Subjective:   Abby Call is a 29 y.o. female who presents for Work-Related Injury (05/27/20 (L) wrist injury )    FUV #3 06/23/2020: Patient states that her pain continues to improve.  Is currently working 8-hour shifts with a 10 pound weight restriction, and feels ready to increase the amount of weight she can lift.  Is using her wrist brace at all times during work but does take it off at home.  Has been taking Ibuprofen,Tylenol and Voltaren gel.  She states the gel is most helpful for her.      Review of Systems   Musculoskeletal: Positive for joint pain (left wrist). Negative for myalgias.   All other systems reviewed and are negative.       MEDS:   Current Outpatient Medications:   •  Diclofenac Sodium 1 % Gel, Apply 2-4 grams to painful joint 4 times daily as needed for pain. (Patient not taking: Reported on 6/23/2020), Disp: 1 Tube, Rfl: 0  •  Diclofenac Sodium 1 % Gel, Apply one application to affected area up to three times per day as needed (Patient not taking: Reported on 6/23/2020), Disp: 1 Tube, Rfl: 0  •  sulfamethoxazole-trimethoprim (BACTRIM DS) 800-160 MG tablet, Take 1 Tab by mouth 2 times a day. (Patient not taking: Reported on 6/23/2020), Disp: 6 Tab, Rfl: 0  •  cyclobenzaprine (FLEXERIL) 5 MG tablet, Take 1 Tab by mouth 3 times a day as needed for Moderate Pain. (Patient not taking: Reported on 6/23/2020), Disp: 30 Tab, Rfl: 0  •  ethinyl estradiol-etonogestrel (NUVARING) 0.12-0.015 MG/24HR vaginal ring, Inserted 1 ring into vagina and wear continuously for 3 weeks; remove for 1 week; repeat with new ring (Patient not taking: Reported on 6/23/2020), Disp: 1 Each, Rfl: 0  •  fluticasone (FLONASE) 50 MCG/ACT nasal spray, Spray 2 Sprays in nose every day. (Patient not taking: Reported on 6/23/2020), Disp: 16 g, Rfl: 0  •  sertraline (ZOLOFT) 25 MG tablet, SERTRALINE HCL 25 MG TABS, Disp: , Rfl:   •  fluticasone-salmeterol (ADVAIR) 250-50 MCG/DOSE AEROSOL POWDER, BREATH ACTIVATED, Inhale 1  "Puff by mouth every 12 hours. (Patient not taking: Reported on 6/23/2020), Disp: 1 Inhaler, Rfl: 0  ALLERGIES: No Known Allergies    Patient's PMH, SocHx, SurgHx, FamHx, Drug allergies and medications were reviewed.     Objective:   /68 (BP Location: Left arm, Patient Position: Sitting, BP Cuff Size: Adult)   Pulse 60   Temp 37.2 °C (98.9 °F) (Temporal)   Resp 16   Ht 1.575 m (5' 2\")   Wt 47.6 kg (105 lb)   SpO2 99%   BMI 19.20 kg/m²     Physical Exam   Constitutional: Vital signs reviewed. Alert and oriented to person, place, and time. Appears well-developed and well-nourished, in no acute distress.   HEENT: Head is normocephalic; nontraumatic external ears normal with normal hearing; sclera white, conjunctivae clear.  Car: Regular rate and rhythm.  Pulmonary/Chest: Respirations non-labored, normal phonation.  Musculoskeletal: Left wrist normal ROM and very mild +TTP, no visible deformities  Neurological: Muscle tone normal. Coordination normal. Light touch and sensation normal.   Abd: Normoactive BS x4, no tenderness or masses noted.   Skin: Negative for rash or suspected lesions in visible areas.   Psychiatric: Normal mood and affect. Behavior is normal. Judgment and thought content normal.     Assessment/Plan:   1. Strain of left wrist, subsequent encounter    See D39, return to work with less restrictions, can lift up to 25 pounds.  Continue to wear wrist brace at all times while at work.  Follow up with [facility].  Advised of signs and symptoms which would warrant further evaluation and /or emergent evaluation in ER.  All questions answered and the patient agrees to the plan of care.       "

## 2020-07-15 ENCOUNTER — OFFICE VISIT (OUTPATIENT)
Dept: MEDICAL GROUP | Facility: MEDICAL CENTER | Age: 30
End: 2020-07-15
Attending: PHYSICIAN ASSISTANT
Payer: MEDICAID

## 2020-07-15 VITALS
WEIGHT: 102.7 LBS | SYSTOLIC BLOOD PRESSURE: 90 MMHG | BODY MASS INDEX: 18.9 KG/M2 | TEMPERATURE: 97.4 F | DIASTOLIC BLOOD PRESSURE: 50 MMHG | RESPIRATION RATE: 16 BRPM | OXYGEN SATURATION: 98 % | HEIGHT: 62 IN | HEART RATE: 68 BPM

## 2020-07-15 DIAGNOSIS — R53.83 FATIGUE, UNSPECIFIED TYPE: ICD-10-CM

## 2020-07-15 DIAGNOSIS — R73.03 PREDIABETES: ICD-10-CM

## 2020-07-15 LAB
GLUCOSE BLD-MCNC: 93 MG/DL (ref 70–100)
HBA1C MFR BLD: 5.4 % (ref 0–5.6)
INT CON NEG: NEGATIVE
INT CON POS: POSITIVE

## 2020-07-15 PROCEDURE — 82962 GLUCOSE BLOOD TEST: CPT | Performed by: PHYSICIAN ASSISTANT

## 2020-07-15 PROCEDURE — 99213 OFFICE O/P EST LOW 20 MIN: CPT | Performed by: PHYSICIAN ASSISTANT

## 2020-07-15 PROCEDURE — 99214 OFFICE O/P EST MOD 30 MIN: CPT | Performed by: PHYSICIAN ASSISTANT

## 2020-07-15 PROCEDURE — 83036 HEMOGLOBIN GLYCOSYLATED A1C: CPT | Performed by: PHYSICIAN ASSISTANT

## 2020-07-15 RX ORDER — LANCETS 30 GAUGE
EACH MISCELLANEOUS
Qty: 100 EACH | Refills: 0 | Status: SHIPPED | OUTPATIENT
Start: 2020-07-15 | End: 2020-08-09

## 2020-07-15 RX ORDER — CEFDINIR 300 MG/1
CAPSULE ORAL
COMMUNITY
Start: 2020-05-20 | End: 2020-08-09

## 2020-07-15 RX ORDER — GLUCOSAMINE HCL/CHONDROITIN SU 500-400 MG
CAPSULE ORAL
Qty: 100 EACH | Refills: 0 | Status: SHIPPED | OUTPATIENT
Start: 2020-07-15 | End: 2020-08-09

## 2020-07-15 ASSESSMENT — ENCOUNTER SYMPTOMS
TINGLING: 0
WEAKNESS: 0
DOUBLE VISION: 0
CLAUDICATION: 0
FEVER: 0
BLURRED VISION: 0
BLOOD IN STOOL: 0
CHILLS: 0
VOMITING: 0
SINUS PAIN: 0
WEIGHT LOSS: 0
DIARRHEA: 0
SHORTNESS OF BREATH: 0
TREMORS: 1
NAUSEA: 0
HEADACHES: 1
CONSTIPATION: 0
PHOTOPHOBIA: 0
COUGH: 0
SORE THROAT: 0
WHEEZING: 0
PALPITATIONS: 0
DIZZINESS: 0

## 2020-07-15 ASSESSMENT — FIBROSIS 4 INDEX: FIB4 SCORE: 0.53

## 2020-07-15 NOTE — ASSESSMENT & PLAN NOTE
"Abby presents today for new onset fatigue and weakness described as \"low energy feeling like I have nothing left\".  This is been going on for the past 2 weeks.  She reports that she has been working 15-hour days for the [employer] due to under staffing and when off shift she is taking care of her child who is in the teething stage which makes it difficult for her to get a good nights rest.  Associated symptoms include nausea, tremors/shakiness, headaches and occasional numbness tingling in the bilateral hands.  She mostly experiences first thing in the morning or towards the end of the day.  She eats about 2 meals a day and stays adequately hydrated.  Nonfasting glucose in clinic today was 93.  A1c was 5.4% indicating that she is no longer in the prediabetic range.  She denies recent illness, fever/chills, night sweats, changes in vision shortness of breath, difficulty breathing, chest pains, palpitations, abdominal pain, diarrhea, constipation, dysuria, pelvic pain or muscular weakness.  "

## 2020-07-15 NOTE — LETTER
[facility]  [address] [place] [address]  [phone]     July 15, 2020    Patient: Abby Call   YOB: 1990   Date of Visit: 7/15/2020       To Whom It May Concern:    Abby Call was seen and treated in our department on 7/15/2020. Please excuse her from missing work for her doctors appointment.    If you have any questions or concerns please do not hesitate to call the number listed above.     Sincerely,         Mine Rodgers P.A.-C.

## 2020-07-15 NOTE — PROGRESS NOTES
"Chief Complaint   Patient presents with   • Bump     Under arm left side    • Fatigue   • Follow-Up       Subjective:     HPI:   Abby Call is a 29 y.o. female here to discuss the evaluation and management of:    Fatigue  Abby presents today for new onset fatigue and weakness described as \"low energy feeling like I have nothing left\".  This is been going on for the past 2 weeks.  She reports that she has been working 15-hour days for the [employer] office due to under staffing and when off shift she is taking care of her child who is in the teething stage which makes it difficult for her to get a good nights rest.  Associated symptoms include nausea, tremors/shakiness, headaches and occasional numbness tingling in the bilateral hands.  She mostly experiences first thing in the morning or towards the end of the day.  She eats about 2 meals a day and stays adequately hydrated.  Nonfasting glucose in clinic today was 93.  A1c was 5.4% indicating that she is no longer in the prediabetic range.  She denies recent illness, fever/chills, night sweats, changes in vision shortness of breath, difficulty breathing, chest pains, palpitations, abdominal pain, diarrhea, constipation, dysuria, pelvic pain or muscular weakness.      ROS  Review of Systems   Constitutional: Positive for malaise/fatigue. Negative for chills, fever and weight loss.   HENT: Negative for congestion, sinus pain and sore throat.    Eyes: Negative for blurred vision, double vision and photophobia.   Respiratory: Negative for cough, shortness of breath and wheezing.    Cardiovascular: Negative for chest pain, palpitations, claudication and leg swelling.   Gastrointestinal: Negative for blood in stool, constipation, diarrhea, melena, nausea and vomiting.   Genitourinary: Negative for dysuria, frequency, hematuria and urgency.   Neurological: Positive for tremors and headaches. Negative for dizziness, tingling and weakness.       No Known " Allergies    Current medicines (including changes today)  Current Outpatient Medications   Medication Sig Dispense Refill   • Blood Glucose Meter Kit Test blood sugar once in morning before eating or drinking anything. 1 Kit 0   • Blood Glucose Test Strips Use one strip to test blood sugar once in the morning. 100 Strip 0   • Lancets Use one  lancet to test blood sugar once in the morning. 100 Each 0   • Alcohol Swabs Wipe site with prep pad prior to injection. 100 Each 0   • cefdinir (OMNICEF) 300 MG Cap TAKE 1 CAPSULE BY MOUTH EVERY 12 HOURS FOR 3 DAYS     • Diclofenac Sodium 1 % Gel Apply 2-4 grams to painful joint 4 times daily as needed for pain. (Patient not taking: Reported on 6/23/2020) 1 Tube 0   • Diclofenac Sodium 1 % Gel Apply one application to affected area up to three times per day as needed (Patient not taking: Reported on 6/23/2020) 1 Tube 0   • sulfamethoxazole-trimethoprim (BACTRIM DS) 800-160 MG tablet Take 1 Tab by mouth 2 times a day. (Patient not taking: Reported on 6/23/2020) 6 Tab 0   • cyclobenzaprine (FLEXERIL) 5 MG tablet Take 1 Tab by mouth 3 times a day as needed for Moderate Pain. (Patient not taking: Reported on 6/23/2020) 30 Tab 0   • ethinyl estradiol-etonogestrel (NUVARING) 0.12-0.015 MG/24HR vaginal ring Inserted 1 ring into vagina and wear continuously for 3 weeks; remove for 1 week; repeat with new ring (Patient not taking: Reported on 6/23/2020) 1 Each 0   • fluticasone (FLONASE) 50 MCG/ACT nasal spray Spray 2 Sprays in nose every day. (Patient not taking: Reported on 6/23/2020) 16 g 0   • sertraline (ZOLOFT) 25 MG tablet SERTRALINE HCL 25 MG TABS     • fluticasone-salmeterol (ADVAIR) 250-50 MCG/DOSE AEROSOL POWDER, BREATH ACTIVATED Inhale 1 Puff by mouth every 12 hours. (Patient not taking: Reported on 6/23/2020) 1 Inhaler 0     No current facility-administered medications for this visit.        Social History     Tobacco Use   • Smoking status: Never Smoker   • Smokeless  "tobacco: Never Used   Substance Use Topics   • Alcohol use: Yes     Alcohol/week: 4.2 oz     Types: 7 Glasses of wine per week     Drinks per session: 1 or 2     Comment: on the weekends   • Drug use: No       Patient Active Problem List    Diagnosis Date Noted   • Fatigue 07/15/2020   • Left wrist pain 04/24/2020   • Weight loss 04/24/2020   • Hip pain, left 05/08/2019   • Environmental allergies 05/08/2019   • Neutropenia (HCC) 10/03/2017   • Migraine without aura and without status migrainosus, not intractable 09/11/2017   • Anxiety and depression 09/11/2017   • Insomnia disorder 09/11/2017   • Encounter for birth control 09/11/2017       Family History   Problem Relation Age of Onset   • Cancer Father    • Cancer Maternal Aunt         Breast cancer   • Cancer Maternal Grandmother         Breast cancer   • Arthritis Maternal Grandmother    • Cancer Paternal Grandfather         Prostate CA        Objective:     BP (!) 90/50 (BP Location: Left arm, Patient Position: Sitting, BP Cuff Size: Adult)   Pulse 68   Temp 36.3 °C (97.4 °F) (Temporal)   Resp 16   Ht 1.575 m (5' 2\")   Wt 46.6 kg (102 lb 11.2 oz)   SpO2 98%  Body mass index is 18.78 kg/m².    Physical Exam:  Physical Exam   Constitutional: She is oriented to person, place, and time and well-developed, well-nourished, and in no distress.   HENT:   Head: Normocephalic.   Right Ear: External ear normal.   Left Ear: External ear normal.   Neck: Normal range of motion.   Cardiovascular: Normal rate, regular rhythm and normal heart sounds.   Pulmonary/Chest: Effort normal and breath sounds normal.   Musculoskeletal: Normal range of motion.   Neurological: She is alert and oriented to person, place, and time. Gait normal.   Skin: Skin is warm and dry.   Psychiatric: Affect and judgment normal.   Vitals reviewed.      Assessment and Plan:     The following treatment plan was discussed:    1. Fatigue, unspecified type  - This is an acute condition.  -Anemia " versus vitamin deficiencies versus thyroid disorder versus hypoglycemic episodes.   - Labs ordered as follows:  - IRON/TOTAL IRON BIND; Future  - FERRITIN; Future  - CBC WITH DIFFERENTIAL; Future  - TSH WITH REFLEX TO FT4; Future  - VITAMIN D,25 HYDROXY; Future  - Comp Metabolic Panel; Future  - VITAMIN B12; Future  - FOLATE; Future  - I will notify the patient via Dr. Z once I received her lab results.  - I would like for the patient to start checking her fasting glucose levels first thing in the morning when she wakes up and anytime thereafter if she is feeling symptomatic.  - She has been instructed to write these values down and send the numbers to me via Dr. Z in 1 week for further evaluation.  - Blood Glucose Meter Kit; Test blood sugar once in morning before eating or drinking anything.  Dispense: 1 Kit; Refill: 0  - Blood Glucose Test Strips; Use one strip to test blood sugar once in the morning.  Dispense: 100 Strip; Refill: 0  - Lancets; Use one  lancet to test blood sugar once in the morning.  Dispense: 100 Each; Refill: 0  - Alcohol Swabs; Wipe site with prep pad prior to injection.  Dispense: 100 Each; Refill: 0      2. Prediabetes  - POCT nonfasting glucose was 93.  - POCT  A1C was 5.4% indicating that she is no longer in the prediabetic range.      Any change or worsening of signs or symptoms, patient encouraged to follow-up or report to emergency room for further evaluation. Patient verbalizes understanding and agrees.    Follow-Up: Return if symptoms worsen or fail to improve.      PLEASE NOTE: This dictation was created using voice recognition software. I have made every reasonable attempt to correct obvious errors, but I expect that there are errors of grammar and possibly content that I did not discover before finalizing the note.

## 2020-07-15 NOTE — LETTER
[address] [address]  [phone]     July 15, 2020    Patient: Abby Call   YOB: 1990   Date of Visit: 7/15/2020       To Whom It May Concern:    Abby Call was seen and treated in our department on 7/15/2020. She is to only work an 8 hour shift today, 7/15/20 and please excuse her from work for Thursday and Friday due to necessary medical reasons.     Sincerely,           Mine Rodgers P.A.-C.

## 2020-07-17 ENCOUNTER — APPOINTMENT (OUTPATIENT)
Dept: LAB | Facility: MEDICAL CENTER | Age: 30
End: 2020-07-17
Attending: PHYSICIAN ASSISTANT
Payer: MEDICAID

## 2020-07-17 DIAGNOSIS — R73.03 PREDIABETES: ICD-10-CM

## 2020-07-17 RX ORDER — LANCING DEVICE
1 EACH MISCELLANEOUS DAILY
Qty: 1 EACH | Refills: 0 | Status: SHIPPED | OUTPATIENT
Start: 2020-07-17 | End: 2020-08-09

## 2020-08-09 ENCOUNTER — HOSPITAL ENCOUNTER (EMERGENCY)
Facility: MEDICAL CENTER | Age: 30
End: 2020-08-09
Attending: EMERGENCY MEDICINE
Payer: MEDICAID

## 2020-08-09 ENCOUNTER — APPOINTMENT (OUTPATIENT)
Dept: RADIOLOGY | Facility: MEDICAL CENTER | Age: 30
End: 2020-08-09
Attending: EMERGENCY MEDICINE
Payer: MEDICAID

## 2020-08-09 VITALS
HEIGHT: 62 IN | TEMPERATURE: 98.8 F | BODY MASS INDEX: 19.43 KG/M2 | SYSTOLIC BLOOD PRESSURE: 112 MMHG | HEART RATE: 70 BPM | OXYGEN SATURATION: 99 % | WEIGHT: 105.6 LBS | DIASTOLIC BLOOD PRESSURE: 63 MMHG | RESPIRATION RATE: 20 BRPM

## 2020-08-09 DIAGNOSIS — N39.0 URINARY TRACT INFECTION WITHOUT HEMATURIA, SITE UNSPECIFIED: ICD-10-CM

## 2020-08-09 DIAGNOSIS — R10.31 RIGHT LOWER QUADRANT ABDOMINAL PAIN: ICD-10-CM

## 2020-08-09 LAB
ALBUMIN SERPL BCP-MCNC: 4 G/DL (ref 3.2–4.9)
ALBUMIN/GLOB SERPL: 1.4 G/DL
ALP SERPL-CCNC: 34 U/L (ref 30–99)
ALT SERPL-CCNC: 8 U/L (ref 2–50)
ANION GAP SERPL CALC-SCNC: 11 MMOL/L (ref 7–16)
APPEARANCE UR: CLEAR
AST SERPL-CCNC: 15 U/L (ref 12–45)
BACTERIA #/AREA URNS HPF: ABNORMAL /HPF
BASOPHILS # BLD AUTO: 0.2 % (ref 0–1.8)
BASOPHILS # BLD: 0.01 K/UL (ref 0–0.12)
BILIRUB SERPL-MCNC: 1.2 MG/DL (ref 0.1–1.5)
BILIRUB UR QL STRIP.AUTO: NEGATIVE
BUN SERPL-MCNC: 10 MG/DL (ref 8–22)
CALCIUM SERPL-MCNC: 8.8 MG/DL (ref 8.4–10.2)
CHLORIDE SERPL-SCNC: 102 MMOL/L (ref 96–112)
CO2 SERPL-SCNC: 22 MMOL/L (ref 20–33)
COLOR UR: YELLOW
CREAT SERPL-MCNC: 0.73 MG/DL (ref 0.5–1.4)
EOSINOPHIL # BLD AUTO: 0.02 K/UL (ref 0–0.51)
EOSINOPHIL NFR BLD: 0.3 % (ref 0–6.9)
EPI CELLS #/AREA URNS HPF: ABNORMAL /HPF
ERYTHROCYTE [DISTWIDTH] IN BLOOD BY AUTOMATED COUNT: 39.7 FL (ref 35.9–50)
GLOBULIN SER CALC-MCNC: 2.9 G/DL (ref 1.9–3.5)
GLUCOSE SERPL-MCNC: 77 MG/DL (ref 65–99)
GLUCOSE UR STRIP.AUTO-MCNC: NEGATIVE MG/DL
HCG SERPL QL: NEGATIVE
HCT VFR BLD AUTO: 39.4 % (ref 37–47)
HGB BLD-MCNC: 13.4 G/DL (ref 12–16)
IMM GRANULOCYTES # BLD AUTO: 0.01 K/UL (ref 0–0.11)
IMM GRANULOCYTES NFR BLD AUTO: 0.2 % (ref 0–0.9)
KETONES UR STRIP.AUTO-MCNC: 15 MG/DL
LEUKOCYTE ESTERASE UR QL STRIP.AUTO: ABNORMAL
LYMPHOCYTES # BLD AUTO: 3.09 K/UL (ref 1–4.8)
LYMPHOCYTES NFR BLD: 53 % (ref 22–41)
MCH RBC QN AUTO: 30.9 PG (ref 27–33)
MCHC RBC AUTO-ENTMCNC: 34 G/DL (ref 33.6–35)
MCV RBC AUTO: 90.8 FL (ref 81.4–97.8)
MICRO URNS: ABNORMAL
MONOCYTES # BLD AUTO: 0.38 K/UL (ref 0–0.85)
MONOCYTES NFR BLD AUTO: 6.5 % (ref 0–13.4)
NEUTROPHILS # BLD AUTO: 2.32 K/UL (ref 2–7.15)
NEUTROPHILS NFR BLD: 39.8 % (ref 44–72)
NITRITE UR QL STRIP.AUTO: NEGATIVE
NRBC # BLD AUTO: 0 K/UL
NRBC BLD-RTO: 0 /100 WBC
PH UR STRIP.AUTO: 8 [PH] (ref 5–8)
PLATELET # BLD AUTO: 212 K/UL (ref 164–446)
PMV BLD AUTO: 9.5 FL (ref 9–12.9)
POTASSIUM SERPL-SCNC: 3.6 MMOL/L (ref 3.6–5.5)
PROT SERPL-MCNC: 6.9 G/DL (ref 6–8.2)
PROT UR QL STRIP: NEGATIVE MG/DL
RBC # BLD AUTO: 4.34 M/UL (ref 4.2–5.4)
RBC UR QL AUTO: NEGATIVE
SODIUM SERPL-SCNC: 135 MMOL/L (ref 135–145)
SP GR UR STRIP.AUTO: 1.01
WBC # BLD AUTO: 5.8 K/UL (ref 4.8–10.8)
WBC #/AREA URNS HPF: ABNORMAL /HPF

## 2020-08-09 PROCEDURE — 99284 EMERGENCY DEPT VISIT MOD MDM: CPT

## 2020-08-09 PROCEDURE — 96375 TX/PRO/DX INJ NEW DRUG ADDON: CPT

## 2020-08-09 PROCEDURE — 700111 HCHG RX REV CODE 636 W/ 250 OVERRIDE (IP): Performed by: EMERGENCY MEDICINE

## 2020-08-09 PROCEDURE — 84703 CHORIONIC GONADOTROPIN ASSAY: CPT

## 2020-08-09 PROCEDURE — 81001 URINALYSIS AUTO W/SCOPE: CPT

## 2020-08-09 PROCEDURE — 700102 HCHG RX REV CODE 250 W/ 637 OVERRIDE(OP): Performed by: EMERGENCY MEDICINE

## 2020-08-09 PROCEDURE — 700117 HCHG RX CONTRAST REV CODE 255: Performed by: EMERGENCY MEDICINE

## 2020-08-09 PROCEDURE — A9270 NON-COVERED ITEM OR SERVICE: HCPCS | Performed by: EMERGENCY MEDICINE

## 2020-08-09 PROCEDURE — 74177 CT ABD & PELVIS W/CONTRAST: CPT | Mod: MG

## 2020-08-09 PROCEDURE — 85025 COMPLETE CBC W/AUTO DIFF WBC: CPT

## 2020-08-09 PROCEDURE — 80053 COMPREHEN METABOLIC PANEL: CPT

## 2020-08-09 PROCEDURE — 96374 THER/PROPH/DIAG INJ IV PUSH: CPT | Mod: XU

## 2020-08-09 RX ORDER — FLUTICASONE PROPIONATE 50 MCG
1 SPRAY, SUSPENSION (ML) NASAL EVERY EVENING
Status: SHIPPED | COMMUNITY
End: 2021-12-16 | Stop reason: SDUPTHER

## 2020-08-09 RX ORDER — ONDANSETRON 2 MG/ML
4 INJECTION INTRAMUSCULAR; INTRAVENOUS ONCE
Status: COMPLETED | OUTPATIENT
Start: 2020-08-09 | End: 2020-08-09

## 2020-08-09 RX ORDER — MORPHINE SULFATE 4 MG/ML
4 INJECTION, SOLUTION INTRAMUSCULAR; INTRAVENOUS ONCE
Status: COMPLETED | OUTPATIENT
Start: 2020-08-09 | End: 2020-08-09

## 2020-08-09 RX ORDER — SULFAMETHOXAZOLE AND TRIMETHOPRIM 800; 160 MG/1; MG/1
1 TABLET ORAL 2 TIMES DAILY
Qty: 10 TAB | Refills: 0 | Status: SHIPPED | OUTPATIENT
Start: 2020-08-09 | End: 2021-03-08

## 2020-08-09 RX ORDER — KETOROLAC TROMETHAMINE 30 MG/ML
30 INJECTION, SOLUTION INTRAMUSCULAR; INTRAVENOUS ONCE
Status: COMPLETED | OUTPATIENT
Start: 2020-08-09 | End: 2020-08-09

## 2020-08-09 RX ORDER — ETONOGESTREL AND ETHINYL ESTRADIOL VAGINAL RING .015; .12 MG/D; MG/D
1 RING VAGINAL SEE ADMIN INSTRUCTIONS
Status: SHIPPED | COMMUNITY
End: 2020-09-21

## 2020-08-09 RX ADMIN — MORPHINE SULFATE 4 MG: 4 INJECTION INTRAVENOUS at 11:29

## 2020-08-09 RX ADMIN — ONDANSETRON HYDROCHLORIDE 4 MG: 2 SOLUTION INTRAMUSCULAR; INTRAVENOUS at 11:29

## 2020-08-09 RX ADMIN — KETOROLAC TROMETHAMINE 30 MG: 30 INJECTION, SOLUTION INTRAMUSCULAR at 11:37

## 2020-08-09 RX ADMIN — MAGNESIUM HYDROXIDE 30 ML: 400 SUSPENSION ORAL at 13:37

## 2020-08-09 RX ADMIN — IOHEXOL 75 ML: 350 INJECTION, SOLUTION INTRAVENOUS at 12:30

## 2020-08-09 ASSESSMENT — FIBROSIS 4 INDEX: FIB4 SCORE: 0.53

## 2020-08-09 ASSESSMENT — LIFESTYLE VARIABLES: DO YOU DRINK ALCOHOL: NO

## 2020-08-09 NOTE — ED PROVIDER NOTES
"ED Provider Note    ED Provider    Means of arrival: Private vehicle  History obtained from: Patient  History limited by: None    CHIEF COMPLAINT  Chief Complaint   Patient presents with   • RLQ Pain   • Nausea       HPI  Abby Call is a 29 y.o. female who presents with complaints of right lower quadrant abdominal pain.  The started about 3 to 4 days ago, was generalized, and is now in the right lower quadrant.  The pain was initially intermittent but is been constant since yesterday.  Pain is moderate, waxing and waning but constant.    She has had nausea, no vomiting no diarrhea.  She denies constipation.  No fevers chills or sweats.  She has not had surgeries for her gallbladder or appendix    REVIEW OF SYSTEMS  See HPI for further details. All other systems are negative.     PAST MEDICAL HISTORY   has a past medical history of Anxiety and depression and Migraine.    SOCIAL HISTORY  Social History     Tobacco Use   • Smoking status: Never Smoker   • Smokeless tobacco: Never Used   Substance and Sexual Activity   • Alcohol use: Yes     Alcohol/week: 4.2 oz     Types: 7 Glasses of wine per week     Drinks per session: 1 or 2     Comment: on the weekends   • Drug use: No   • Sexual activity: Not Currently     Partners: Male       SURGICAL HISTORY   has a past surgical history that includes primary c section (01/31/2019).    CURRENT MEDICATIONS  Home Medications     Reviewed by Sandy Chavez (Pharmacy Tech) on 08/09/20 at [facility]  Med List Status: Complete   Medication Last Dose Status   Diclofenac Sodium (VOLTAREN) 1 % Gel > 4 days Active   ethinyl estradiol-etonogestrel (NUVARING) 0.12-0.015 MG/24HR vaginal ring 8/7/2020 Active   fluticasone (FLONASE) 50 MCG/ACT nasal spray 8/8/2020 Active   multivitamin (THERAGRAN) Tab >2 days Active                ALLERGIES  No Known Allergies    PHYSICAL EXAM  VITAL SIGNS: /73   Pulse 77   Temp 37.1 °C (98.8 °F) (Temporal)   Resp 20   Ht 1.575 m (5' 2\") "   Wt 47.9 kg (105 lb 9.6 oz)   LMP 08/02/2020   SpO2 99%   BMI 19.31 kg/m²   Constitutional: Alert in no apparent distress.  HENT: No signs of trauma, Mucous membranes are moist   Eyes:  Conjunctiva normal, Non-icteric.   Neck: Normal range of motion, No tenderness, Supple,  Lymphatic: No lymphadenopathy noted.   Cardiovascular: Regular rate and rhythm, no murmurs.   Thorax & Lungs: Normal breath sounds, No respiratory distress, No wheezing, No chest tenderness.   Abdomen: Bowel sounds normal, Soft, there is no tenderness in the right lower quadrant, mild rebound tenderness is present.  No guarding or rigidity.  No masses, No pulsatile masses. No peritoneal signs.  Skin: Warm, Dry,Normal color  Back: No bony tenderness, No CVA tenderness.   Extremities:No edema, No tenderness, No cyanosis,    Musculoskeletal: Good range of motion in all major joints. No tenderness to palpation or major deformities noted.   Neurologic: Alert ,Oriented x4, Normal motor function, Normal sensory function, No focal deficits noted.   Psychiatric: Affect normal, Judgment normal, Mood normal.       MEDICAL DECISION MAKING  This is a 29 y.o. female who presents with pain in the right lower quadrant.  There is concerns for renal lithiasis, cholecystitis, and appendicitis.  Ectopic pregnancy is also considered.  Labs will include pregnancy test, CBC, and imaging to determine appendicitis.    DIAGNOSTIC STUDIES / PROCEDURES    EKG       LABS  Results for orders placed or performed during the hospital encounter of 08/09/20   CBC WITH DIFFERENTIAL   Result Value Ref Range    WBC 5.8 4.8 - 10.8 K/uL    RBC 4.34 4.20 - 5.40 M/uL    Hemoglobin 13.4 12.0 - 16.0 g/dL    Hematocrit 39.4 37.0 - 47.0 %    MCV 90.8 81.4 - 97.8 fL    MCH 30.9 27.0 - 33.0 pg    MCHC 34.0 33.6 - 35.0 g/dL    RDW 39.7 35.9 - 50.0 fL    Platelet Count 212 164 - 446 K/uL    MPV 9.5 9.0 - 12.9 fL    Neutrophils-Polys 39.80 (L) 44.00 - 72.00 %    Lymphocytes 53.00 (H) 22.00 -  41.00 %    Monocytes 6.50 0.00 - 13.40 %    Eosinophils 0.30 0.00 - 6.90 %    Basophils 0.20 0.00 - 1.80 %    Immature Granulocytes 0.20 0.00 - 0.90 %    Nucleated RBC 0.00 /100 WBC    Neutrophils (Absolute) 2.32 2.00 - 7.15 K/uL    Lymphs (Absolute) 3.09 1.00 - 4.80 K/uL    Monos (Absolute) 0.38 0.00 - 0.85 K/uL    Eos (Absolute) 0.02 0.00 - 0.51 K/uL    Baso (Absolute) 0.01 0.00 - 0.12 K/uL    Immature Granulocytes (abs) 0.01 0.00 - 0.11 K/uL    NRBC (Absolute) 0.00 K/uL   COMP METABOLIC PANEL   Result Value Ref Range    Sodium 135 135 - 145 mmol/L    Potassium 3.6 3.6 - 5.5 mmol/L    Chloride 102 96 - 112 mmol/L    Co2 22 20 - 33 mmol/L    Anion Gap 11.0 7.0 - 16.0    Glucose 77 65 - 99 mg/dL    Bun 10 8 - 22 mg/dL    Creatinine 0.73 0.50 - 1.40 mg/dL    Calcium 8.8 8.4 - 10.2 mg/dL    AST(SGOT) 15 12 - 45 U/L    ALT(SGPT) 8 2 - 50 U/L    Alkaline Phosphatase 34 30 - 99 U/L    Total Bilirubin 1.2 0.1 - 1.5 mg/dL    Albumin 4.0 3.2 - 4.9 g/dL    Total Protein 6.9 6.0 - 8.2 g/dL    Globulin 2.9 1.9 - 3.5 g/dL    A-G Ratio 1.4 g/dL   URINALYSIS CULTURE, IF INDICATED    Specimen: Urine   Result Value Ref Range    Color Yellow     Character Clear     Specific Gravity 1.010 <1.035    Ph 8.0 5.0 - 8.0    Glucose Negative Negative mg/dL    Ketones 15 (A) Negative mg/dL    Protein Negative Negative mg/dL    Bilirubin Negative Negative    Nitrite Negative Negative    Leukocyte Esterase Trace (A) Negative    Occult Blood Negative Negative    Micro Urine Req Microscopic    HCG QUAL SERUM   Result Value Ref Range    Beta-Hcg Qualitative Serum Negative Negative   ESTIMATED GFR   Result Value Ref Range    GFR If African American >60 >60 mL/min/1.73 m 2    GFR If Non African American >60 >60 mL/min/1.73 m 2   URINE MICROSCOPIC (W/UA)   Result Value Ref Range    WBC 2-5 /hpf    Bacteria Moderate (A) None /hpf    Epithelial Cells Moderate (A) Few /hpf         RADIOLOGY  CT-ABDOMEN-PELVIS WITH   Final Result      No evidence of  abdominal or pelvic mass, adenopathy, or inflammatory change.      X-rays were visualized by me    COURSE  Pertinent Labs & Imaging studies reviewed. (See chart for details)    11:19 AM - Patient seen and examined at bedside. Discussed plan of care,     Hospital course: This patient presented with right lower quadrant abdominal pain with concerns for appendicitis, cholelithiasis, cholecystitis and kidney stones.  CT was done and shows no inflammatory or obstructive process.  Her urinalysis has a mild urinary tract infection she will be placed on antibiotics for this.    Her CT shows some stool in the right colon, should be treated with a laxative to see if this will resolve her pain.  She was instructed to return to the emergency department if her pain is not resolved in 12 to 24 hours for reevaluation of her appendicitis            FINAL IMPRESSION  1. Right lower quadrant abdominal pain    2. Urinary tract infection without hematuria, site unspecified        The note accurately reflects work and decisions made by me.  Justin Harrell D.O.  8/9/2020  1:41 PM

## 2020-08-09 NOTE — ED TRIAGE NOTES
Pt ambulates back to room gait steady  Reports unable to obtain urine sample at this time  Chief Complaint   Patient presents with   • RLQ Pain   • Nausea   Pt A & 0 x 4, speech clear, ambulates well    COVID-19 screening criteria completed, pt denies high risk travel and denies contact with COVID-19 positive pt

## 2020-08-09 NOTE — ED NOTES
Pt medicated for pain  Pt aware of need for urine sample  Pt aware she needs to call for a ride after receiving narcotics

## 2020-08-09 NOTE — ED NOTES
Pt discharged, reviewed all discharge instructions including follow up, pt verbalizes understanding, and denies questions.   electronic prescriptions discussed with pt  Escorted to lobby. No belongings left in room.

## 2020-08-09 NOTE — DISCHARGE INSTRUCTIONS
Your CT scan shows no appendicitis, could be early appendicitis not being seen at this point you are being discharged to stay on clear liquids, use Motrin Tylenol for pain if pain continues 12 to 24 hours return for reevaluation.    Your urinalysis also shows a small urinary tract infection being placed on antibiotics for that.

## 2020-08-10 ENCOUNTER — HOSPITAL ENCOUNTER (EMERGENCY)
Facility: MEDICAL CENTER | Age: 30
End: 2020-08-10
Attending: EMERGENCY MEDICINE
Payer: MEDICAID

## 2020-08-10 ENCOUNTER — APPOINTMENT (OUTPATIENT)
Dept: RADIOLOGY | Facility: MEDICAL CENTER | Age: 30
End: 2020-08-10
Attending: EMERGENCY MEDICINE
Payer: MEDICAID

## 2020-08-10 VITALS
HEIGHT: 62 IN | RESPIRATION RATE: 16 BRPM | OXYGEN SATURATION: 99 % | TEMPERATURE: 98.5 F | WEIGHT: 104.94 LBS | BODY MASS INDEX: 19.31 KG/M2 | DIASTOLIC BLOOD PRESSURE: 69 MMHG | SYSTOLIC BLOOD PRESSURE: 108 MMHG | HEART RATE: 71 BPM

## 2020-08-10 DIAGNOSIS — R10.31 RIGHT LOWER QUADRANT ABDOMINAL PAIN: ICD-10-CM

## 2020-08-10 LAB
ALBUMIN SERPL BCP-MCNC: 4 G/DL (ref 3.2–4.9)
ALBUMIN/GLOB SERPL: 1.3 G/DL
ALP SERPL-CCNC: 34 U/L (ref 30–99)
ALT SERPL-CCNC: 11 U/L (ref 2–50)
ANION GAP SERPL CALC-SCNC: 9 MMOL/L (ref 7–16)
APPEARANCE UR: CLEAR
AST SERPL-CCNC: 18 U/L (ref 12–45)
BASOPHILS # BLD AUTO: 0.3 % (ref 0–1.8)
BASOPHILS # BLD: 0.02 K/UL (ref 0–0.12)
BILIRUB SERPL-MCNC: 1.3 MG/DL (ref 0.1–1.5)
BILIRUB UR QL STRIP.AUTO: NEGATIVE
BUN SERPL-MCNC: 12 MG/DL (ref 8–22)
CALCIUM SERPL-MCNC: 8.9 MG/DL (ref 8.4–10.2)
CHLORIDE SERPL-SCNC: 104 MMOL/L (ref 96–112)
CO2 SERPL-SCNC: 24 MMOL/L (ref 20–33)
COLOR UR: YELLOW
CREAT SERPL-MCNC: 0.83 MG/DL (ref 0.5–1.4)
EOSINOPHIL # BLD AUTO: 0.02 K/UL (ref 0–0.51)
EOSINOPHIL NFR BLD: 0.3 % (ref 0–6.9)
ERYTHROCYTE [DISTWIDTH] IN BLOOD BY AUTOMATED COUNT: 41 FL (ref 35.9–50)
GLOBULIN SER CALC-MCNC: 3 G/DL (ref 1.9–3.5)
GLUCOSE SERPL-MCNC: 75 MG/DL (ref 65–99)
GLUCOSE UR STRIP.AUTO-MCNC: NEGATIVE MG/DL
HCT VFR BLD AUTO: 41.4 % (ref 37–47)
HGB BLD-MCNC: 13.9 G/DL (ref 12–16)
IMM GRANULOCYTES # BLD AUTO: 0.02 K/UL (ref 0–0.11)
IMM GRANULOCYTES NFR BLD AUTO: 0.3 % (ref 0–0.9)
KETONES UR STRIP.AUTO-MCNC: NEGATIVE MG/DL
LEUKOCYTE ESTERASE UR QL STRIP.AUTO: NEGATIVE
LIPASE SERPL-CCNC: 25 U/L (ref 7–58)
LYMPHOCYTES # BLD AUTO: 3.35 K/UL (ref 1–4.8)
LYMPHOCYTES NFR BLD: 48.8 % (ref 22–41)
MCH RBC QN AUTO: 31 PG (ref 27–33)
MCHC RBC AUTO-ENTMCNC: 33.6 G/DL (ref 33.6–35)
MCV RBC AUTO: 92.4 FL (ref 81.4–97.8)
MICRO URNS: NORMAL
MONOCYTES # BLD AUTO: 0.55 K/UL (ref 0–0.85)
MONOCYTES NFR BLD AUTO: 8 % (ref 0–13.4)
NEUTROPHILS # BLD AUTO: 2.9 K/UL (ref 2–7.15)
NEUTROPHILS NFR BLD: 42.3 % (ref 44–72)
NITRITE UR QL STRIP.AUTO: NEGATIVE
NRBC # BLD AUTO: 0 K/UL
NRBC BLD-RTO: 0 /100 WBC
PH UR STRIP.AUTO: 6.5 [PH] (ref 5–8)
PLATELET # BLD AUTO: 198 K/UL (ref 164–446)
PMV BLD AUTO: 9.8 FL (ref 9–12.9)
POTASSIUM SERPL-SCNC: 3.7 MMOL/L (ref 3.6–5.5)
PROT SERPL-MCNC: 7 G/DL (ref 6–8.2)
PROT UR QL STRIP: NEGATIVE MG/DL
RBC # BLD AUTO: 4.48 M/UL (ref 4.2–5.4)
RBC UR QL AUTO: NEGATIVE
SODIUM SERPL-SCNC: 137 MMOL/L (ref 135–145)
SP GR UR STRIP.AUTO: 1.02
WBC # BLD AUTO: 6.9 K/UL (ref 4.8–10.8)

## 2020-08-10 PROCEDURE — 99284 EMERGENCY DEPT VISIT MOD MDM: CPT

## 2020-08-10 PROCEDURE — 36415 COLL VENOUS BLD VENIPUNCTURE: CPT

## 2020-08-10 PROCEDURE — 80053 COMPREHEN METABOLIC PANEL: CPT

## 2020-08-10 PROCEDURE — 81003 URINALYSIS AUTO W/O SCOPE: CPT

## 2020-08-10 PROCEDURE — 700111 HCHG RX REV CODE 636 W/ 250 OVERRIDE (IP): Performed by: EMERGENCY MEDICINE

## 2020-08-10 PROCEDURE — 76856 US EXAM PELVIC COMPLETE: CPT

## 2020-08-10 PROCEDURE — 83690 ASSAY OF LIPASE: CPT

## 2020-08-10 PROCEDURE — 96372 THER/PROPH/DIAG INJ SC/IM: CPT

## 2020-08-10 PROCEDURE — 85025 COMPLETE CBC W/AUTO DIFF WBC: CPT

## 2020-08-10 RX ORDER — MORPHINE SULFATE 4 MG/ML
4 INJECTION, SOLUTION INTRAMUSCULAR; INTRAVENOUS ONCE
Status: DISCONTINUED | OUTPATIENT
Start: 2020-08-10 | End: 2020-08-10

## 2020-08-10 RX ORDER — ONDANSETRON 2 MG/ML
4 INJECTION INTRAMUSCULAR; INTRAVENOUS ONCE
Status: DISCONTINUED | OUTPATIENT
Start: 2020-08-10 | End: 2020-08-10

## 2020-08-10 RX ORDER — ONDANSETRON 4 MG/1
4 TABLET, ORALLY DISINTEGRATING ORAL ONCE
Status: COMPLETED | OUTPATIENT
Start: 2020-08-10 | End: 2020-08-10

## 2020-08-10 RX ORDER — MORPHINE SULFATE 4 MG/ML
4 INJECTION, SOLUTION INTRAMUSCULAR; INTRAVENOUS ONCE
Status: COMPLETED | OUTPATIENT
Start: 2020-08-10 | End: 2020-08-10

## 2020-08-10 RX ADMIN — ONDANSETRON 4 MG: 4 TABLET, ORALLY DISINTEGRATING ORAL at 18:14

## 2020-08-10 RX ADMIN — MORPHINE SULFATE 4 MG: 4 INJECTION INTRAVENOUS at 18:13

## 2020-08-10 ASSESSMENT — FIBROSIS 4 INDEX: FIB4 SCORE: 0.75

## 2020-08-10 NOTE — ED TRIAGE NOTES
Pt seen here yesterday and had neg workup Advised to return if pain persists or worsens Denies fever N/V No emesis today

## 2020-08-11 ENCOUNTER — OFFICE VISIT (OUTPATIENT)
Dept: MEDICAL GROUP | Facility: MEDICAL CENTER | Age: 30
End: 2020-08-11
Attending: PHYSICIAN ASSISTANT
Payer: MEDICAID

## 2020-08-11 VITALS
WEIGHT: 105.6 LBS | RESPIRATION RATE: 16 BRPM | OXYGEN SATURATION: 98 % | BODY MASS INDEX: 19.43 KG/M2 | HEART RATE: 75 BPM | TEMPERATURE: 98.2 F | DIASTOLIC BLOOD PRESSURE: 62 MMHG | SYSTOLIC BLOOD PRESSURE: 90 MMHG | HEIGHT: 62 IN

## 2020-08-11 DIAGNOSIS — R11.0 NAUSEA: ICD-10-CM

## 2020-08-11 DIAGNOSIS — R10.9 ACUTE ABDOMINAL PAIN: ICD-10-CM

## 2020-08-11 PROCEDURE — 99213 OFFICE O/P EST LOW 20 MIN: CPT | Performed by: PHYSICIAN ASSISTANT

## 2020-08-11 PROCEDURE — 99214 OFFICE O/P EST MOD 30 MIN: CPT | Performed by: PHYSICIAN ASSISTANT

## 2020-08-11 RX ORDER — BLOOD SUGAR DIAGNOSTIC
STRIP MISCELLANEOUS
COMMUNITY
Start: 2020-07-15 | End: 2022-08-09

## 2020-08-11 RX ORDER — KETOROLAC TROMETHAMINE 30 MG/ML
30 INJECTION, SOLUTION INTRAMUSCULAR; INTRAVENOUS ONCE
Status: COMPLETED | OUTPATIENT
Start: 2020-08-11 | End: 2020-08-11

## 2020-08-11 RX ORDER — ONDANSETRON 4 MG/1
4 TABLET, FILM COATED ORAL EVERY 4 HOURS PRN
Qty: 20 TAB | Refills: 1 | Status: SHIPPED | OUTPATIENT
Start: 2020-08-11 | End: 2020-08-18

## 2020-08-11 RX ADMIN — KETOROLAC TROMETHAMINE 30 MG: 30 INJECTION, SOLUTION INTRAMUSCULAR; INTRAVENOUS at 13:40

## 2020-08-11 ASSESSMENT — ENCOUNTER SYMPTOMS
WEIGHT LOSS: 0
TINGLING: 0
PHOTOPHOBIA: 0
CONSTIPATION: 1
CLAUDICATION: 0
SORE THROAT: 0
BLURRED VISION: 0
DOUBLE VISION: 0
FLANK PAIN: 0
WHEEZING: 0
BLOOD IN STOOL: 0
DIZZINESS: 0
ABDOMINAL PAIN: 1
SINUS PAIN: 0
NAUSEA: 1
WEAKNESS: 0
SHORTNESS OF BREATH: 0
VOMITING: 0
COUGH: 0
DIARRHEA: 0
HEADACHES: 0
CHILLS: 0
PALPITATIONS: 0
BACK PAIN: 1
FEVER: 0

## 2020-08-11 ASSESSMENT — FIBROSIS 4 INDEX: FIB4 SCORE: 0.82

## 2020-08-11 NOTE — LETTER
[address] [address]  [phone]     August 11, 2020    Patient: Abby Call   YOB: 1990   Date of Visit: 8/11/2020       To Whom It May Concern:    Abby Call was seen and treated in our department on 8/11/2020. Please excuse her from work starting 8/112020 to 8/16/2020 for medical reasons.     If you have any questions or concerns please feel free to give the number listed above a call.     Sincerely,         Mine Rodgers P.A.-C.

## 2020-08-11 NOTE — PROGRESS NOTES
"Chief Complaint   Patient presents with   • Nausea   • Back Pain   • Hospital Follow-up       Subjective:     HPI:   Abby Call is a 29 y.o. female here to discuss the evaluation and management of:    Acute abdominal pain  Abby presents today for hospital follow-up regarding acute abdominal pain that began 5 days ago and has worsened over time.  She was initially seen in the emergency room where they performed a CT scan of the abdomen and pelvis and adonay labs.  Everything came back unremarkable.  She was discharged from the emergency room and was told to return if she has worsening of pain or new onset of symptoms.  She returned to the ED the following day had labs redrawn and an transabdominal pelvic ultrasound completed which was unremarkable.  She was instructed to follow-up with her PCP if the pain continues to persist for further evaluation.  Abby describes the pain as a \"sharp, burning pain that started out intermittent and now has become constant and radiates to the back\".  She reports associated nausea and vomiting.  She reports that it has been difficult to keep food and water down without feeling nauseous.  Alleviating factors include laying down and sleeping.  There are no aggravating factors that she is aware of.  She reports associated constipation for the past 2 days.  She was given a medication in the emergency room for constipation but was unable to keep the medication down due to GI upset.  She denies fevers chills, night sweats, hematemesis, hematochezia, melena, BRBPR, diarrhea, dysuria, frequency, urgency, hematuria, chest pain, shortness of breath, difficulty breathing or difficulty swallowing.       ROS  Review of Systems   Constitutional: Negative for chills, fever, malaise/fatigue and weight loss.   HENT: Negative for congestion, sinus pain and sore throat.    Eyes: Negative for blurred vision, double vision and photophobia.   Respiratory: Negative for cough, shortness of " breath and wheezing.    Cardiovascular: Negative for chest pain, palpitations, claudication and leg swelling.   Gastrointestinal: Positive for abdominal pain, constipation and nausea. Negative for blood in stool, diarrhea, melena and vomiting.   Genitourinary: Negative for dysuria, flank pain, frequency, hematuria and urgency.   Musculoskeletal: Positive for back pain.   Neurological: Negative for dizziness, tingling, weakness and headaches.       No Known Allergies    Current medicines (including changes today)  Current Outpatient Medications   Medication Sig Dispense Refill   • ondansetron (ZOFRAN) 4 MG Tab tablet Take 1 Tab by mouth every four hours as needed for Nausea/Vomiting for up to 7 days. 20 Tab 1   • Alcohol Swabs (ALCOHOL PADS) 70 % Pads      • ONETOUCH ULTRA strip      • Diclofenac Sodium (VOLTAREN) 1 % Gel Apply 1 Application to skin as directed as needed (Apply's on left wrist).     • ethinyl estradiol-etonogestrel (NUVARING) 0.12-0.015 MG/24HR vaginal ring Insert 1 Each in vagina See Admin Instructions. Wears continuously for 3 weeks and remove for 1 week, last placed on 8/7/2020.     • fluticasone (FLONASE) 50 MCG/ACT nasal spray Spray 1 Spray in nose every evening.     • multivitamin (THERAGRAN) Tab Take 1 Tab by mouth every day.     • sulfamethoxazole-trimethoprim (BACTRIM DS) 800-160 MG tablet Take 1 Tab by mouth 2 times a day. 10 Tab 0     Current Facility-Administered Medications   Medication Dose Route Frequency Provider Last Rate Last Dose   • ketorolac (TORADOL) injection 30 mg  30 mg Intramuscular Once Mine Rodgers P.A.-CAntoinette           Social History     Tobacco Use   • Smoking status: Never Smoker   • Smokeless tobacco: Never Used   Substance Use Topics   • Alcohol use: Yes     Alcohol/week: 4.2 oz     Types: 7 Glasses of wine per week     Drinks per session: 1 or 2     Comment: on the weekends   • Drug use: No       Patient Active Problem List    Diagnosis Date Noted   • Acute abdominal  "pain 08/11/2020   • Fatigue 07/15/2020   • Left wrist pain 04/24/2020   • Weight loss 04/24/2020   • Hip pain, left 05/08/2019   • Environmental allergies 05/08/2019   • Neutropenia (HCC) 10/03/2017   • Migraine without aura and without status migrainosus, not intractable 09/11/2017   • Anxiety and depression 09/11/2017   • Insomnia disorder 09/11/2017   • Encounter for birth control 09/11/2017       Family History   Problem Relation Age of Onset   • Cancer Father    • Cancer Maternal Aunt         Breast cancer   • Cancer Maternal Grandmother         Breast cancer   • Arthritis Maternal Grandmother    • Cancer Paternal Grandfather         Prostate CA        Objective:     BP (!) 90/60 (BP Location: Right arm, Patient Position: Sitting, BP Cuff Size: Adult)   Pulse 75   Temp 36.8 °C (98.2 °F) (Temporal)   Resp 16   Ht 1.575 m (5' 2\")   Wt 47.9 kg (105 lb 9.6 oz)   SpO2 98%  Body mass index is 19.31 kg/m².    Physical Exam:  Physical Exam   Constitutional: She is oriented to person, place, and time and well-developed, well-nourished, and in no distress.   HENT:   Head: Normocephalic.   Right Ear: External ear normal.   Left Ear: External ear normal.   Neck: Normal range of motion.   Cardiovascular: Normal rate, regular rhythm and normal heart sounds.   Pulmonary/Chest: Effort normal and breath sounds normal.   Abdominal: Soft. Normal appearance and bowel sounds are normal. There is no hepatosplenomegaly. There is generalized abdominal tenderness. There is no rigidity, no rebound, no guarding, no CVA tenderness, no tenderness at McBurney's point and negative Francis's sign.   Musculoskeletal: Normal range of motion.   Neurological: She is alert and oriented to person, place, and time. Gait normal.   Skin: Skin is warm and dry.   Psychiatric: Affect and judgment normal.   Vitals reviewed.      Assessment and Plan:     The following treatment plan was discussed:    1. Acute abdominal pain  - This is an acute " condition that is worsening.   - No GI alarm or red flag signs or symptoms. Vitals are stable.  - Labs and imaging have been reviewed and are unremarkable.   - Urgent REFERRAL TO GASTROENTEROLOGY has been placed for further evaluation.   - Ondansetron (ZOFRAN) 4 MG Tab tablet; Take 1 Tab by mouth every four hours as needed for Nausea/Vomiting for up to 7 days.  Dispense: 20 Tab; Refill: 1  - Ketorolac (TORADOL) injection 30 mg administered in clinic for pain. Patient tolerated this well without any complications.   - She has been educated on the use and potential side effects of this medication.   - It there is any change or worsening of signs or symptoms, patient encouraged to follow-up or report to emergency room for further evaluation. Patient verbalizes understanding and agrees.    Follow-Up: Return if symptoms worsen or fail to improve.      PLEASE NOTE: This dictation was created using voice recognition software. I have made every reasonable attempt to correct obvious errors, but I expect that there are errors of grammar and possibly content that I did not discover before finalizing the note.

## 2020-08-11 NOTE — DISCHARGE INSTRUCTIONS
We still do not have a definite diagnosis for your abdominal pain and therefore close follow-up is recommended unless the pain resolves

## 2020-08-11 NOTE — ED PROVIDER NOTES
ED Provider Note    CHIEF COMPLAINT  Chief Complaint   Patient presents with   • Abdominal Pain     RLQ x 4 d   • Flank Pain     RT       HPI  Abby Call is a 29 y.o. female who presents with complaints of right lower quadrant pain and right flank pain.  This is been present for about the last 4 days.  Initially the pain was fairly intermittent and generalized but now has become more constant in the right lower quadrant.  Patient had one episode of emesis but is not really been having much nausea associated with this.  She is not have urinary symptoms although was diagnosed with possible urinary tract infection yesterday and put on antibiotics.  Since yesterday pain is increased in intensity but no new symptoms have developed    REVIEW OF SYSTEMS  See HPI for further details.  Constitutional no fever or chills   Respiratory no cough or shortness of breath   GI no nausea vomiting no diarrhea   all other systems are negative.    PAST MEDICAL HISTORY  Past Medical History:   Diagnosis Date   • Anxiety and depression    • Migraine        FAMILY HISTORY  Family History   Problem Relation Age of Onset   • Cancer Father    • Cancer Maternal Aunt         Breast cancer   • Cancer Maternal Grandmother         Breast cancer   • Arthritis Maternal Grandmother    • Cancer Paternal Grandfather         Prostate CA       SOCIAL HISTORY  Social History     Socioeconomic History   • Marital status: Single     Spouse name: Not on file   • Number of children: Not on file   • Years of education: Not on file   • Highest education level: Not on file   Occupational History   • Not on file   Social Needs   • Financial resource strain: Not on file   • Food insecurity     Worry: Not on file     Inability: Not on file   • Transportation needs     Medical: Not on file     Non-medical: Not on file   Tobacco Use   • Smoking status: Never Smoker   • Smokeless tobacco: Never Used   Substance and Sexual Activity   • Alcohol use: Yes      "Alcohol/week: 4.2 oz     Types: 7 Glasses of wine per week     Drinks per session: 1 or 2     Comment: on the weekends   • Drug use: No   • Sexual activity: Not Currently     Partners: Male   Lifestyle   • Physical activity     Days per week: Not on file     Minutes per session: Not on file   • Stress: Not on file   Relationships   • Social connections     Talks on phone: Not on file     Gets together: Not on file     Attends Judaism service: Not on file     Active member of club or organization: Not on file     Attends meetings of clubs or organizations: Not on file     Relationship status: Not on file   • Intimate partner violence     Fear of current or ex partner: Not on file     Emotionally abused: Not on file     Physically abused: Not on file     Forced sexual activity: Not on file   Other Topics Concern   • Not on file   Social History Narrative   • Not on file       SURGICAL HISTORY  Past Surgical History:   Procedure Laterality Date   • PRIMARY C SECTION  01/31/2019       CURRENT MEDICATIONS  Home Medications    **Home medications have not yet been reviewed for this encounter**         ALLERGIES  No Known Allergies    PHYSICAL EXAM  VITAL SIGNS: /71   Pulse 60   Temp 36.9 °C (98.5 °F) (Temporal)   Resp 16   Ht 1.575 m (5' 2\")   Wt 47.6 kg (104 lb 15 oz)   LMP 08/02/2020   SpO2 98%   BMI 19.19 kg/m²     Constitutional: Well developed, Well nourished, mild distress, Non-toxic appearance.   HENT: Normocephalic, Atraumatic, Bilateral external ears normal, mucous membranes moist, No oral exudates, Nose normal.   Eyes: PERRLA,  Conjunctiva and lids normal, No discharge.   Cardiovascular: Normal heart rate, Normal rhythm, No murmurs.   Thorax & Lungs: Normal breath sounds, No respiratory distress, No wheezing, or other abnormal breath sounds. No chest tenderness.   Abdomen: Bowel sounds normal, Soft, right lower quadrant tenderness, No masses, No pulsatile masses. No guarding.  Skin: Warm, Dry, No " erythema, No rash.   Back: No tenderness, right CVA tenderness.  Extremities: Intact distal pulses, No edema, No tenderness, No cyanosis, No clubbing.   Musculoskeletal: Good range of motion in all major joints. No tenderness to palpation or major deformities, or injuries noted.   Neurologic: Alert & oriented x 3, Normal motor function, Normal sensory function, No focal deficits noted.         RADIOLOGY/PROCEDURES  US-PELVIC COMPLETE (TRANSABDOMINAL/TRANSVAGINAL) (COMBO)   Final Result      Pelvic ultrasound within normal limits        Results for orders placed or performed during the hospital encounter of 08/10/20   CBC WITH DIFFERENTIAL   Result Value Ref Range    WBC 6.9 4.8 - 10.8 K/uL    RBC 4.48 4.20 - 5.40 M/uL    Hemoglobin 13.9 12.0 - 16.0 g/dL    Hematocrit 41.4 37.0 - 47.0 %    MCV 92.4 81.4 - 97.8 fL    MCH 31.0 27.0 - 33.0 pg    MCHC 33.6 33.6 - 35.0 g/dL    RDW 41.0 35.9 - 50.0 fL    Platelet Count 198 164 - 446 K/uL    MPV 9.8 9.0 - 12.9 fL    Neutrophils-Polys 42.30 (L) 44.00 - 72.00 %    Lymphocytes 48.80 (H) 22.00 - 41.00 %    Monocytes 8.00 0.00 - 13.40 %    Eosinophils 0.30 0.00 - 6.90 %    Basophils 0.30 0.00 - 1.80 %    Immature Granulocytes 0.30 0.00 - 0.90 %    Nucleated RBC 0.00 /100 WBC    Neutrophils (Absolute) 2.90 2.00 - 7.15 K/uL    Lymphs (Absolute) 3.35 1.00 - 4.80 K/uL    Monos (Absolute) 0.55 0.00 - 0.85 K/uL    Eos (Absolute) 0.02 0.00 - 0.51 K/uL    Baso (Absolute) 0.02 0.00 - 0.12 K/uL    Immature Granulocytes (abs) 0.02 0.00 - 0.11 K/uL    NRBC (Absolute) 0.00 K/uL   COMP METABOLIC PANEL   Result Value Ref Range    Sodium 137 135 - 145 mmol/L    Potassium 3.7 3.6 - 5.5 mmol/L    Chloride 104 96 - 112 mmol/L    Co2 24 20 - 33 mmol/L    Anion Gap 9.0 7.0 - 16.0    Glucose 75 65 - 99 mg/dL    Bun 12 8 - 22 mg/dL    Creatinine 0.83 0.50 - 1.40 mg/dL    Calcium 8.9 8.4 - 10.2 mg/dL    AST(SGOT) 18 12 - 45 U/L    ALT(SGPT) 11 2 - 50 U/L    Alkaline Phosphatase 34 30 - 99 U/L    Total  Bilirubin 1.3 0.1 - 1.5 mg/dL    Albumin 4.0 3.2 - 4.9 g/dL    Total Protein 7.0 6.0 - 8.2 g/dL    Globulin 3.0 1.9 - 3.5 g/dL    A-G Ratio 1.3 g/dL   LIPASE   Result Value Ref Range    Lipase 25 7 - 58 U/L   URINALYSIS,CULTURE IF INDICATED    Specimen: Urine, Clean Catch; Blood   Result Value Ref Range    Color Yellow     Character Clear     Specific Gravity 1.020 <1.035    Ph 6.5 5.0 - 8.0    Glucose Negative Negative mg/dL    Ketones Negative Negative mg/dL    Protein Negative Negative mg/dL    Bilirubin Negative Negative    Nitrite Negative Negative    Leukocyte Esterase Negative Negative    Occult Blood Negative Negative    Micro Urine Req see below    ESTIMATED GFR   Result Value Ref Range    GFR If African American >60 >60 mL/min/1.73 m 2    GFR If Non African American >60 >60 mL/min/1.73 m 2       COURSE & MEDICAL DECISION MAKING  Pertinent Labs & Imaging studies reviewed. (See chart for details)  Patient with right lower lower quadrant pain of uncertain etiology.  Certainly appendicitis seems quite unlikely with a negative CAT scan yesterday.  Ovarian cyst or tubal pregnancy seem excluded by beta-hCG and ultrasound today.  Urinalysis showed possible UTI yesterday but completely clear today seems unlikely to be the cause of explained the patient that causes not clear at this point I do not think that further diagnostic testing indicated at this point since we do not have a diagnosis she will need careful follow-up either here or at primary as we do not have a definite diagnosis however she does not appear to have acute abdomen or an more serious etiology seems ruled out at least at this point will give instructions related to undifferentiated abdominal pain    FINAL IMPRESSION  1.  Abdominal pain undetermined etiology  2.   3.       Electronically signed by: Riley Iqbal M.D., 8/10/2020 6:35 PM

## 2020-08-11 NOTE — ASSESSMENT & PLAN NOTE
"Abby presents today for hospital follow-up regarding acute abdominal pain that began 5 days ago and has worsened over time.  She was initially seen in the emergency room where they performed a CT scan of the abdomen and pelvis and adonay labs.  Everything came back unremarkable.  She was discharged from the emergency room and was told to return if she has worsening of pain or new onset of symptoms.  She returned to the ED the following day had labs redrawn and an transabdominal pelvic ultrasound completed which was unremarkable.  She was instructed to follow-up with her PCP if the pain continues to persist for further evaluation.  Abby describes the pain as a \"sharp, burning pain that started out intermittent and now has become constant and radiates to the back\".  She reports associated nausea and vomiting.  She reports that it has been difficult to keep food and water down without feeling nauseous.  Alleviating factors include laying down and sleeping.  There are no aggravating factors that she is aware of.  She reports associated constipation for the past 2 days.  She was given a medication in the emergency room for constipation but was unable to keep the medication down due to GI upset.  She denies fevers chills, night sweats, hematemesis, hematochezia, melena, BRBPR, diarrhea, dysuria, frequency, urgency, hematuria, chest pain, shortness of breath, difficulty breathing or difficulty swallowing.   "

## 2020-08-11 NOTE — ED NOTES
Per verbal order from ERP, ok to change morphine to IM injection and zofran odt d/t unability to get IV on pt.

## 2020-08-14 DIAGNOSIS — K59.00 CONSTIPATION, UNSPECIFIED CONSTIPATION TYPE: ICD-10-CM

## 2020-08-14 RX ORDER — POLYETHYLENE GLYCOL 3350 17 G/17G
17 POWDER, FOR SOLUTION ORAL DAILY
Qty: 225 G | Refills: 3 | Status: SHIPPED | OUTPATIENT
Start: 2020-08-14 | End: 2021-02-16 | Stop reason: SDUPTHER

## 2020-08-14 RX ORDER — POLYETHYLENE GLYCOL 3350 17 G/17G
17 POWDER, FOR SOLUTION ORAL DAILY
Qty: 225 G | Refills: 3 | Status: SHIPPED | OUTPATIENT
Start: 2020-08-14 | End: 2020-08-14

## 2020-08-17 DIAGNOSIS — R10.9 ACUTE ABDOMINAL PAIN: ICD-10-CM

## 2020-08-17 RX ORDER — OMEPRAZOLE 20 MG/1
20 CAPSULE, DELAYED RELEASE ORAL DAILY
Qty: 30 CAP | Refills: 0 | Status: SHIPPED | OUTPATIENT
Start: 2020-08-17 | End: 2020-09-09

## 2020-09-02 DIAGNOSIS — R10.9 ACUTE ABDOMINAL PAIN: ICD-10-CM

## 2020-09-02 RX ORDER — DICYCLOMINE HCL 20 MG
20 TABLET ORAL EVERY 6 HOURS
Qty: 120 TAB | Refills: 0 | Status: SHIPPED | OUTPATIENT
Start: 2020-09-02 | End: 2020-09-29

## 2020-09-09 DIAGNOSIS — R10.9 ACUTE ABDOMINAL PAIN: ICD-10-CM

## 2020-09-09 RX ORDER — OMEPRAZOLE 20 MG/1
CAPSULE, DELAYED RELEASE ORAL
Qty: 30 CAP | Refills: 0 | Status: SHIPPED | OUTPATIENT
Start: 2020-09-09 | End: 2020-10-14 | Stop reason: SDUPTHER

## 2020-09-11 ENCOUNTER — OFFICE VISIT (OUTPATIENT)
Dept: MEDICAL GROUP | Facility: MEDICAL CENTER | Age: 30
End: 2020-09-11
Attending: PHYSICIAN ASSISTANT
Payer: MEDICAID

## 2020-09-11 VITALS
BODY MASS INDEX: 19.38 KG/M2 | DIASTOLIC BLOOD PRESSURE: 60 MMHG | TEMPERATURE: 98.2 F | OXYGEN SATURATION: 98 % | HEART RATE: 82 BPM | SYSTOLIC BLOOD PRESSURE: 98 MMHG | WEIGHT: 105.3 LBS | HEIGHT: 62 IN

## 2020-09-11 DIAGNOSIS — R10.9 ACUTE ABDOMINAL PAIN: ICD-10-CM

## 2020-09-11 PROCEDURE — 99213 OFFICE O/P EST LOW 20 MIN: CPT | Performed by: PHYSICIAN ASSISTANT

## 2020-09-11 PROCEDURE — 99214 OFFICE O/P EST MOD 30 MIN: CPT | Performed by: PHYSICIAN ASSISTANT

## 2020-09-11 RX ORDER — BLOOD-GLUCOSE METER
EACH MISCELLANEOUS
COMMUNITY
Start: 2020-07-18 | End: 2022-08-09

## 2020-09-11 ASSESSMENT — FIBROSIS 4 INDEX: FIB4 SCORE: 0.82

## 2020-09-11 ASSESSMENT — ENCOUNTER SYMPTOMS
HEADACHES: 0
WHEEZING: 0
WEIGHT LOSS: 0
COUGH: 0
CONSTIPATION: 0
TINGLING: 0
CHILLS: 0
DIARRHEA: 0
WEAKNESS: 0
VOMITING: 0
SHORTNESS OF BREATH: 0
HEARTBURN: 1
PALPITATIONS: 0
BLOOD IN STOOL: 0
DIZZINESS: 0
FEVER: 0
CLAUDICATION: 0
NAUSEA: 1
ABDOMINAL PAIN: 1

## 2020-09-11 NOTE — PROGRESS NOTES
Chief Complaint   Patient presents with   • RLQ Pain       Subjective:     HPI:   Abby Call is a 29 y.o. female here to discuss the evaluation and management of:    Acute abdominal pain  Abby presents today for follow up regarding her post procedural colonoscopy and endoscopy for acute abdominal pain. She was told that she had a twisted transverse colon and a hiatal hernia. She was instructed to continue with Omeprazole for 1 month and then follow up with GI at that time for further evaluation. She has her appointment scheduled with them on 9/29/2020. She reports an increase in her abdominal pain with eating and drinking. She also has experienced chest burning and a sore throat when consuming tomato sauce, alcohol and malt vinegar. She feels that she has not had any relief with the Omeprazole thus far. No GI alarm signs or symptoms present.       ROS  Review of Systems   Constitutional: Negative for chills, fever, malaise/fatigue and weight loss.   Respiratory: Negative for cough, shortness of breath and wheezing.    Cardiovascular: Negative for chest pain, palpitations, claudication and leg swelling.   Gastrointestinal: Positive for abdominal pain, heartburn and nausea. Negative for blood in stool, constipation, diarrhea, melena and vomiting.   Genitourinary: Negative for dysuria, frequency and urgency.   Neurological: Negative for dizziness, tingling, weakness and headaches.       No Known Allergies    Current medicines (including changes today)  Current Outpatient Medications   Medication Sig Dispense Refill   • Blood Glucose Monitoring Suppl (ONE TOUCH ULTRA 2) w/Device Kit USE TO TEST BLOOD SUGAR IN THE MORNING BEFORE BREAKFAST OR DRINK ANYTHING     • omeprazole (PRILOSEC) 20 MG delayed-release capsule TAKE 1 CAPSULE BY MOUTH EVERY DAY 30 Cap 0   • dicyclomine (BENTYL) 20 MG Tab Take 1 Tab by mouth every 6 hours. 120 Tab 0   • polyethylene glycol 3350 (MIRALAX) 17 GM/SCOOP Powder Take 17 g by  mouth every day. 225 g 3   • Alcohol Swabs (ALCOHOL PADS) 70 % Pads      • ONETOUCH ULTRA strip      • Diclofenac Sodium (VOLTAREN) 1 % Gel Apply 1 Application to skin as directed as needed (Apply's on left wrist).     • ethinyl estradiol-etonogestrel (NUVARING) 0.12-0.015 MG/24HR vaginal ring Insert 1 Each in vagina See Admin Instructions. Wears continuously for 3 weeks and remove for 1 week, last placed on 8/7/2020.     • fluticasone (FLONASE) 50 MCG/ACT nasal spray Spray 1 Spray in nose every evening.     • multivitamin (THERAGRAN) Tab Take 1 Tab by mouth every day.     • sulfamethoxazole-trimethoprim (BACTRIM DS) 800-160 MG tablet Take 1 Tab by mouth 2 times a day. 10 Tab 0     No current facility-administered medications for this visit.        Social History     Tobacco Use   • Smoking status: Never Smoker   • Smokeless tobacco: Never Used   Substance Use Topics   • Alcohol use: Yes     Alcohol/week: 4.2 oz     Types: 7 Glasses of wine per week     Drinks per session: 1 or 2     Comment: on the weekends   • Drug use: No       Patient Active Problem List    Diagnosis Date Noted   • Acute abdominal pain 08/11/2020   • Fatigue 07/15/2020   • Left wrist pain 04/24/2020   • Weight loss 04/24/2020   • Hip pain, left 05/08/2019   • Environmental allergies 05/08/2019   • Neutropenia (HCC) 10/03/2017   • Migraine without aura and without status migrainosus, not intractable 09/11/2017   • Anxiety and depression 09/11/2017   • Insomnia disorder 09/11/2017   • Encounter for birth control 09/11/2017       Family History   Problem Relation Age of Onset   • Cancer Father    • Cancer Maternal Aunt         Breast cancer   • Cancer Maternal Grandmother         Breast cancer   • Arthritis Maternal Grandmother    • Cancer Paternal Grandfather         Prostate CA        Objective:     BP (!) 98/60 (BP Location: Left arm, Patient Position: Sitting, BP Cuff Size: Adult)   Pulse 82   Temp 36.8 °C (98.2 °F) (Temporal)   Ht 1.575 m  "(5' 2\")   Wt 47.8 kg (105 lb 4.8 oz)   SpO2 98%  Body mass index is 19.26 kg/m².    Physical Exam:  Physical Exam   Constitutional: She is oriented to person, place, and time and well-developed, well-nourished, and in no distress.   HENT:   Head: Normocephalic.   Right Ear: External ear normal.   Left Ear: External ear normal.   Neck: Normal range of motion.   Cardiovascular: Normal rate, regular rhythm and normal heart sounds.   Pulmonary/Chest: Effort normal and breath sounds normal.   Abdominal: Soft. Bowel sounds are normal.   Musculoskeletal: Normal range of motion.   Neurological: She is alert and oriented to person, place, and time. Gait normal.   Skin: Skin is warm and dry.   Psychiatric: Affect and judgment normal.   Vitals reviewed.      Assessment and Plan:     The following treatment plan was discussed:    1. Acute abdominal pain  - This is a subacute condition.   - No red flag signs.  - Continue with Zofran as needed for nausea.   - Avoid spicy foods, alcohol, chocolate, mint, caffeine and other foods/beverages that have a high acidity level.  - Continue with Omeprazole and follow up with GI as scheduled.     Any change or worsening of signs or symptoms, patient encouraged to follow-up or report to emergency room for further evaluation. Patient verbalizes understanding and agrees.    Follow-Up: Return if symptoms worsen or fail to improve.      PLEASE NOTE: This dictation was created using voice recognition software. I have made every reasonable attempt to correct obvious errors, but I expect that there are errors of grammar and possibly content that I did not discover before finalizing the note.  "

## 2020-09-11 NOTE — ASSESSMENT & PLAN NOTE
Abby presents today for follow up regarding her post procedural colonoscopy and endoscopy for acute abdominal pain. She was told that she had a twisted transverse colon and a hiatal hernia. She was instructed to continue with Omeprazole for 1 month and then follow up with GI at that time for further evaluation. She has her appointment scheduled with them on 9/29/2020. She reports an increase in her abdominal pain with eating and drinking. She also has experienced chest burning and a sore throat when consuming tomato sauce, alcohol and malt vinegar. She feels that she has not had any relief with the Omeprazole thus far. No GI alarm signs or symptoms present.

## 2020-09-16 ENCOUNTER — OFFICE VISIT (OUTPATIENT)
Dept: URGENT CARE | Facility: CLINIC | Age: 30
End: 2020-09-16
Payer: MEDICAID

## 2020-09-16 VITALS
OXYGEN SATURATION: 100 % | WEIGHT: 105 LBS | RESPIRATION RATE: 16 BRPM | HEIGHT: 62 IN | BODY MASS INDEX: 19.32 KG/M2 | DIASTOLIC BLOOD PRESSURE: 74 MMHG | HEART RATE: 62 BPM | TEMPERATURE: 97.4 F | SYSTOLIC BLOOD PRESSURE: 116 MMHG

## 2020-09-16 DIAGNOSIS — S29.012A UPPER BACK STRAIN, INITIAL ENCOUNTER: ICD-10-CM

## 2020-09-16 DIAGNOSIS — S29.011A MUSCLE STRAIN OF CHEST WALL, INITIAL ENCOUNTER: ICD-10-CM

## 2020-09-16 PROCEDURE — 99213 OFFICE O/P EST LOW 20 MIN: CPT | Performed by: NURSE PRACTITIONER

## 2020-09-16 RX ORDER — CYCLOBENZAPRINE HCL 10 MG
10 TABLET ORAL 3 TIMES DAILY PRN
Qty: 30 TAB | Refills: 0 | Status: SHIPPED | OUTPATIENT
Start: 2020-09-16 | End: 2023-04-26 | Stop reason: SDUPTHER

## 2020-09-16 RX ORDER — DICLOFENAC SODIUM 75 MG/1
75 TABLET, DELAYED RELEASE ORAL 2 TIMES DAILY
Qty: 30 TAB | Refills: 0 | Status: SHIPPED | OUTPATIENT
Start: 2020-09-16 | End: 2022-03-07

## 2020-09-16 ASSESSMENT — ENCOUNTER SYMPTOMS: BACK PAIN: 1

## 2020-09-16 ASSESSMENT — FIBROSIS 4 INDEX: FIB4 SCORE: 0.82

## 2020-09-16 NOTE — PROGRESS NOTES
Subjective:      Abby Call is a 29 y.o. female who presents with Back Pain (upper back x 1 day)    Past Medical History:   Diagnosis Date   • Anxiety and depression    • Migraine      Social History     Socioeconomic History   • Marital status: Single     Spouse name: Not on file   • Number of children: Not on file   • Years of education: Not on file   • Highest education level: Not on file   Occupational History   • Not on file   Social Needs   • Financial resource strain: Not on file   • Food insecurity     Worry: Not on file     Inability: Not on file   • Transportation needs     Medical: Not on file     Non-medical: Not on file   Tobacco Use   • Smoking status: Never Smoker   • Smokeless tobacco: Never Used   Substance and Sexual Activity   • Alcohol use: Yes     Alcohol/week: 4.2 oz     Types: 7 Glasses of wine per week     Drinks per session: 1 or 2     Comment: on the weekends   • Drug use: No   • Sexual activity: Not Currently     Partners: Male   Lifestyle   • Physical activity     Days per week: Not on file     Minutes per session: Not on file   • Stress: Not on file   Relationships   • Social connections     Talks on phone: Not on file     Gets together: Not on file     Attends Bahai service: Not on file     Active member of club or organization: Not on file     Attends meetings of clubs or organizations: Not on file     Relationship status: Not on file   • Intimate partner violence     Fear of current or ex partner: Not on file     Emotionally abused: Not on file     Physically abused: Not on file     Forced sexual activity: Not on file   Other Topics Concern   • Not on file   Social History Narrative   • Not on file     Family History   Problem Relation Age of Onset   • Cancer Father    • Cancer Maternal Aunt         Breast cancer   • Cancer Maternal Grandmother         Breast cancer   • Arthritis Maternal Grandmother    • Cancer Paternal Grandfather         Prostate CA       Allergies:  "Patient has no known allergies.    Patient is a 29-year-old female who presents today with complaint of pain to the left midaxillary chest wall and left upper back and left neck and shoulder.  Symptoms started yesterday.  She does not know of anything specific that she did to cause injury.  States she does occasionally lift heavy objects and also picks up her young son.  Patient states she did not have pain yesterday but woke up with it this morning.  Denies fall or trauma.  No shortness of breath or difficulty breathing.        Back Pain  This is a new problem. The current episode started yesterday. The problem occurs constantly. The problem is unchanged. Pain location: upper back. The quality of the pain is described as aching. The pain does not radiate.       Review of Systems   Musculoskeletal: Positive for back pain.   All other systems reviewed and are negative.         Objective:     /74 (BP Location: Left arm, Patient Position: Sitting, BP Cuff Size: Adult)   Pulse 62   Temp 36.3 °C (97.4 °F) (Temporal)   Resp 16   Ht 1.575 m (5' 2\")   Wt 47.6 kg (105 lb)   SpO2 100%   BMI 19.20 kg/m²      Physical Exam  Vitals signs reviewed.   Constitutional:       Appearance: Normal appearance.   Neck:      Musculoskeletal: Normal range of motion and neck supple.   Cardiovascular:      Rate and Rhythm: Normal rate and regular rhythm.      Heart sounds: Normal heart sounds.   Pulmonary:      Effort: Pulmonary effort is normal. No respiratory distress.      Breath sounds: Normal breath sounds. No stridor. No wheezing, rhonchi or rales.   Chest:      Chest wall: No tenderness.       Musculoskeletal: Normal range of motion.        Back:       Comments: Point tenderness to the posterior chest wall on the left side as illustrated.  Tenderness extends to the costal margin in the mid axillary line on the left side.  No contusion or swelling noted.  There is point tenderness to the posterior neck and shoulder area " bilaterally.   Skin:     General: Skin is warm and dry.      Capillary Refill: Capillary refill takes less than 2 seconds.   Neurological:      General: No focal deficit present.      Mental Status: She is alert and oriented to person, place, and time.   Psychiatric:         Mood and Affect: Mood normal.         Behavior: Behavior normal.         Thought Content: Thought content normal.         Judgment: Judgment normal.                 Assessment/Plan:   Chest wall strain  Upper back strain    Flexeril  Diclofenac every 12 hours; take with food  Alternate ice and heat  Topical over-the-counter analgesic of choice  Return in 7 to 10 days for recurrent symptoms  Return immediately for shortness of breath, cough, or hemoptysis     There are no diagnoses linked to this encounter.

## 2020-09-16 NOTE — LETTER
September 16, 2020       Patient: Abby Call   YOB: 1990   Date of Visit: 9/16/2020         To Whom It May Concern:    In my medical opinion, I recommend that Abby Call may return to work on 9/17/20.    If you have any questions or concerns, please don't hesitate to call [phone]          Sincerely,          Cathey J Hamman, A.P.N.  Electronically Signed

## 2020-09-21 DIAGNOSIS — Z30.015 ENCOUNTER FOR INITIAL PRESCRIPTION OF VAGINAL RING HORMONAL CONTRACEPTIVE: ICD-10-CM

## 2020-09-21 RX ORDER — ETONOGESTREL AND ETHINYL ESTRADIOL VAGINAL RING .015; .12 MG/D; MG/D
RING VAGINAL
Qty: 1 EACH | Refills: 6 | Status: SHIPPED | OUTPATIENT
Start: 2020-09-21 | End: 2021-04-12

## 2020-09-29 DIAGNOSIS — R10.9 ACUTE ABDOMINAL PAIN: ICD-10-CM

## 2020-09-29 RX ORDER — DICYCLOMINE HCL 20 MG
TABLET ORAL
Qty: 120 TAB | Refills: 0 | Status: SHIPPED | OUTPATIENT
Start: 2020-09-29 | End: 2021-06-18

## 2020-10-14 DIAGNOSIS — R10.9 ACUTE ABDOMINAL PAIN: ICD-10-CM

## 2020-10-14 RX ORDER — OMEPRAZOLE 20 MG/1
20 CAPSULE, DELAYED RELEASE ORAL
Qty: 30 CAP | Refills: 0 | Status: SHIPPED | OUTPATIENT
Start: 2020-10-14 | End: 2020-11-09

## 2020-11-09 DIAGNOSIS — R10.9 ACUTE ABDOMINAL PAIN: ICD-10-CM

## 2020-11-09 RX ORDER — OMEPRAZOLE 20 MG/1
CAPSULE, DELAYED RELEASE ORAL
Qty: 30 CAP | Refills: 0 | Status: SHIPPED | OUTPATIENT
Start: 2020-11-09 | End: 2020-12-16 | Stop reason: SDUPTHER

## 2020-11-12 ENCOUNTER — TELEMEDICINE (OUTPATIENT)
Dept: MEDICAL GROUP | Facility: MEDICAL CENTER | Age: 30
End: 2020-11-12
Attending: PHYSICIAN ASSISTANT
Payer: MEDICAID

## 2020-11-12 VITALS
HEIGHT: 62 IN | TEMPERATURE: 98 F | SYSTOLIC BLOOD PRESSURE: 116 MMHG | HEART RATE: 82 BPM | BODY MASS INDEX: 20.61 KG/M2 | WEIGHT: 112 LBS | DIASTOLIC BLOOD PRESSURE: 74 MMHG

## 2020-11-12 DIAGNOSIS — G89.29 CHRONIC ABDOMINAL PAIN: ICD-10-CM

## 2020-11-12 DIAGNOSIS — R10.9 CHRONIC ABDOMINAL PAIN: ICD-10-CM

## 2020-11-12 DIAGNOSIS — G43.009 MIGRAINE WITHOUT AURA AND WITHOUT STATUS MIGRAINOSUS, NOT INTRACTABLE: ICD-10-CM

## 2020-11-12 PROCEDURE — 99213 OFFICE O/P EST LOW 20 MIN: CPT | Mod: CR | Performed by: PHYSICIAN ASSISTANT

## 2020-11-12 RX ORDER — SUMATRIPTAN 50 MG/1
50 TABLET, FILM COATED ORAL
Qty: 9 TAB | Refills: 2 | Status: SHIPPED | OUTPATIENT
Start: 2020-11-12 | End: 2022-03-08 | Stop reason: SDUPTHER

## 2020-11-12 RX ORDER — AMOXICILLIN AND CLAVULANATE POTASSIUM 875; 125 MG/1; MG/1
1 TABLET, FILM COATED ORAL
COMMUNITY
Start: 2020-09-07 | End: 2022-03-07

## 2020-11-12 RX ORDER — ONDANSETRON 4 MG/1
TABLET, FILM COATED ORAL
COMMUNITY
Start: 2020-10-28 | End: 2021-03-08

## 2020-11-12 ASSESSMENT — FIBROSIS 4 INDEX: FIB4 SCORE: 0.82

## 2020-11-12 NOTE — ASSESSMENT & PLAN NOTE
Abby presents today via virtual visit for follow-up regarding abdominal pain.  She continues to see GI for her chronic abdominal pain.  She reports that her provider started her on stool softener, MiraLAX and fiber to help with her constipation in order to see if her abdominal pain is relieved.  She reports that if this combination of medication is unsuccessful the next step is to plan to have her wear a swallow camera to evaluate esophageal motility.

## 2020-11-12 NOTE — PROGRESS NOTES
"Virtual Visit: New Patient   This visit was conducted via Zoom using secure and encrypted videoconferencing technology. The patient was in a private location in the state of [place].    The patient's identity was confirmed and verbal consent was obtained for this virtual visit.    Subjective:     CC:   Chief Complaint   Patient presents with   • Headache   • Abdominal Pain   • Follow-Up     Abby Call is a 29 y.o. female presenting to establish care and to discuss the evaluation and management of:    Migraine without aura and without status migrainosus, not intractable  Onset: 10/18/20.  Reports a history of migraines however has worsened in intensity and has not had any relief.  Location: starts out bilateral and sometimes becomes unilateral over the eye.  Duration: \"everyday and lasts throughout the day\".  Character: Pressing, vice like, muscular tightness of neck/shoulders (takes muscle relaxer to help with this), nausea usually occurs when the migraine affects the eye, dizziness/lightheadedness, photophobia, phonophobia.   No changes in level of consciousness, nuchal rigidity or abnormal motor function.  No increase in pain with /physical activity or movement.  Alleviating factors: Advil helps for about 1 hour as well as icy hot over the neck muscles.  Treatments tried: Advil and muscle relaxer - minimal benefit.  No identifiable triggers.   Drinks about 3 bottles of water/day. Approximately 50.7 oz.  Previous Headache hx: everyday for almost 1 month.   Brain imaging in the past: None.   No red flag signs.    Chronic abdominal pain  Abby presents today via virtual visit for follow-up regarding abdominal pain.  She continues to see GI for her chronic abdominal pain.  She reports that her provider started her on stool softener, MiraLAX and fiber to help with her constipation in order to see if her abdominal pain is relieved.  She reports that if this combination of medication is unsuccessful the next " step is to plan to have her wear a swallow camera to evaluate esophageal motility.     ROS  See HPI  Constitutional: Negative for fever, chills and malaise/fatigue.   HENT: Negative for congestion.    Eyes: Negative for pain.   Respiratory: Negative for cough and shortness of breath.    Cardiovascular: Negative for leg swelling.   Gastrointestinal: + Abdominal pain, + nausea. Negative for nausea, vomiting, and diarrhea.   Genitourinary: Negative for dysuria and hematuria.   Skin: Negative for rash.   Neurological: + Migraines.  Negative for focal weakness.  Sensory or motor function.  Endo/Heme/Allergies: Does not bruise/bleed easily.   Psychiatric/Behavioral: Negative for depression.  The patient is not nervous/anxious.      No Known Allergies    Current medicines (including changes today)  Current Outpatient Medications   Medication Sig Dispense Refill   • SUMAtriptan (IMITREX) 50 MG Tab Take 1 Tab by mouth Once PRN for Migraine for up to 1 dose. 9 Tab 2   • amoxicillin-clavulanate (AUGMENTIN) 875-125 MG Tab Take 1 Tab by mouth. FOR 7 DAYS     • ondansetron (ZOFRAN) 4 MG Tab tablet TK 1 T PO Q 4 H PRF NAUSEA/VOMITING FOR 7 DAYS     • omeprazole (PRILOSEC) 20 MG delayed-release capsule TAKE 1 CAPSULE BY MOUTH EVERY DAY 30 Cap 0   • dicyclomine (BENTYL) 20 MG Tab TAKE 1 TABLET BY MOUTH EVERY 6 HOURS 120 Tab 0   • ethinyl estradiol-etonogestrel (NUVARING) 0.12-0.015 MG/24HR vaginal ring INSERT 1 RING VAGINALLY AS DIRECTED. REMOVE AFTER 3 WEEKS & WAIT 7 DAYS BEFORE INSERTING A NEW RING 1 Each 6   • diclofenac DR (VOLTAREN) 75 MG Tablet Delayed Response Take 1 Tab by mouth 2 times a day. 30 Tab 0   • cyclobenzaprine (FLEXERIL) 10 MG Tab Take 1 Tab by mouth 3 times a day as needed. 30 Tab 0   • Blood Glucose Monitoring Suppl (ONE TOUCH ULTRA 2) w/Device Kit USE TO TEST BLOOD SUGAR IN THE MORNING BEFORE BREAKFAST OR DRINK ANYTHING     • polyethylene glycol 3350 (MIRALAX) 17 GM/SCOOP Powder Take 17 g by mouth every day.  "225 g 3   • Alcohol Swabs (ALCOHOL PADS) 70 % Pads      • ONETOUCH ULTRA strip      • Diclofenac Sodium (VOLTAREN) 1 % Gel Apply 1 Application to skin as directed as needed (Apply's on left wrist).     • fluticasone (FLONASE) 50 MCG/ACT nasal spray Spray 1 Spray in nose every evening.     • multivitamin (THERAGRAN) Tab Take 1 Tab by mouth every day.     • sulfamethoxazole-trimethoprim (BACTRIM DS) 800-160 MG tablet Take 1 Tab by mouth 2 times a day. 10 Tab 0     No current facility-administered medications for this visit.        She  has a past medical history of Anxiety and depression and Migraine.  She  has a past surgical history that includes primary c section (01/31/2019).      Family History   Problem Relation Age of Onset   • Cancer Father    • Cancer Maternal Aunt         Breast cancer   • Cancer Maternal Grandmother         Breast cancer   • Arthritis Maternal Grandmother    • Cancer Paternal Grandfather         Prostate CA     Family Status   Relation Name Status   • Mo  Alive   • Fa  Alive   • MAunt  Alive   • MGMo  Alive   • PGFa  (Not Specified)       Patient Active Problem List    Diagnosis Date Noted   • Chronic abdominal pain 08/11/2020   • Fatigue 07/15/2020   • Left wrist pain 04/24/2020   • Weight loss 04/24/2020   • Hip pain, left 05/08/2019   • Environmental allergies 05/08/2019   • Neutropenia (HCC) 10/03/2017   • Migraine without aura and without status migrainosus, not intractable 09/11/2017   • Anxiety and depression 09/11/2017   • Insomnia disorder 09/11/2017   • Encounter for birth control 09/11/2017          Objective:   /74   Pulse 82   Temp 36.7 °C (98 °F) (Temporal) Comment: CA PT.  Ht 1.575 m (5' 2\")   Wt 50.8 kg (112 lb)   BMI 20.49 kg/m²     Physical Exam:  Constitutional: Alert, no distress, well-groomed.  Skin: No rashes in visible areas.  Eye: Round. Conjunctiva clear, lids normal. No icterus.   ENMT: Lips pink without lesions, good dentition, moist mucous membranes. " Phonation normal.  Neck: No masses, no thyromegaly. Moves freely without pain.  Respiratory: Unlabored respiratory effort, no cough or audible wheeze  Psych: Alert and oriented x3, normal affect and mood.       Assessment and Plan:   The following treatment plan was discussed:     1. Migraine without aura and without status migrainosus, not intractable  - This chronic uncontrolled condition.  - Patient has experienced greater than 4 migraines in the last 3 weeks without relief despite conservative measures.   - Plan: Trial sumatriptan for abortive therapy.  - SUMAtriptan (IMITREX) 50 MG Tab; Take 1 Tab by mouth Once PRN for Migraine for up to 1 dose.  Dispense: 9 Tab; Refill: 2  - Patient has been educated on the use potential side effect profile of this medication.  - Follow-up in 6 weeks for med review.  If patient is still experiencing greater than 4 migraines without relief, we will increase sumatriptan to 100 mg.    2. Chronic abdominal pain  - Continue follow-up with GI as scheduled.      Follow-up: Return in about 5 weeks (around 12/17/2020) for Med check.

## 2020-12-16 DIAGNOSIS — R10.9 ACUTE ABDOMINAL PAIN: ICD-10-CM

## 2020-12-16 RX ORDER — OMEPRAZOLE 20 MG/1
20 CAPSULE, DELAYED RELEASE ORAL
Qty: 30 CAP | Refills: 0 | Status: SHIPPED | OUTPATIENT
Start: 2020-12-16 | End: 2020-12-18 | Stop reason: SDUPTHER

## 2020-12-18 DIAGNOSIS — R10.9 ACUTE ABDOMINAL PAIN: ICD-10-CM

## 2020-12-21 RX ORDER — OMEPRAZOLE 20 MG/1
20 CAPSULE, DELAYED RELEASE ORAL
Qty: 30 CAP | Refills: 0 | Status: SHIPPED | OUTPATIENT
Start: 2020-12-21 | End: 2020-12-22 | Stop reason: SDUPTHER

## 2020-12-22 ENCOUNTER — TELEMEDICINE (OUTPATIENT)
Dept: MEDICAL GROUP | Facility: MEDICAL CENTER | Age: 30
End: 2020-12-22
Attending: PHYSICIAN ASSISTANT
Payer: MEDICAID

## 2020-12-22 VITALS
HEART RATE: 82 BPM | TEMPERATURE: 97.2 F | WEIGHT: 112 LBS | DIASTOLIC BLOOD PRESSURE: 74 MMHG | BODY MASS INDEX: 20.61 KG/M2 | HEIGHT: 62 IN | SYSTOLIC BLOOD PRESSURE: 116 MMHG

## 2020-12-22 DIAGNOSIS — F41.9 ANXIETY AND DEPRESSION: ICD-10-CM

## 2020-12-22 DIAGNOSIS — R10.9 ACUTE ABDOMINAL PAIN: ICD-10-CM

## 2020-12-22 DIAGNOSIS — F32.A ANXIETY AND DEPRESSION: ICD-10-CM

## 2020-12-22 PROCEDURE — 99214 OFFICE O/P EST MOD 30 MIN: CPT | Mod: 95,CR | Performed by: PHYSICIAN ASSISTANT

## 2020-12-22 RX ORDER — OMEPRAZOLE 20 MG/1
20 CAPSULE, DELAYED RELEASE ORAL
Qty: 30 CAP | Refills: 0 | Status: SHIPPED | OUTPATIENT
Start: 2020-12-22 | End: 2022-10-25

## 2020-12-22 RX ORDER — PLECANATIDE 3 MG/1
1 TABLET ORAL
COMMUNITY
Start: 2020-12-15 | End: 2022-03-07

## 2020-12-22 RX ORDER — SERTRALINE HYDROCHLORIDE 100 MG/1
100 TABLET, FILM COATED ORAL DAILY
Qty: 30 TAB | Refills: 1 | Status: SHIPPED | OUTPATIENT
Start: 2020-12-22 | End: 2021-02-08

## 2020-12-22 ASSESSMENT — FIBROSIS 4 INDEX: FIB4 SCORE: 0.85

## 2020-12-22 NOTE — PROGRESS NOTES
Virtual Visit: Established Patient   This visit was conducted via Zoom using secure and encrypted videoconferencing technology. The patient was in a private location in the state of [place].    The patient's identity was confirmed and verbal consent was obtained for this virtual visit.    Subjective:   CC:   Chief Complaint   Patient presents with   • Anxiety   • Depression       Abby Call is a 30 y.o. female presenting for evaluation and management of:    Anxiety and depression    Anxiety  Abby presents today for evaluation of excessive anxiety and worry. Patient finds it difficult to control the worry and is easily fatigued, difficulty concentrating or mind going blank, restlessness or feeling keyed up or on edge, irritability, muscle tension, sleep disturbances. This has affected her social/occupational areas of functioning. DESI 7 performed in clinic today with a score of 15 indicating severe anxiety. There is no imminent risk of serious self-harm/suicide. No current substance abuse. No Hx of known thyroid disorder. No Hx of psychiatric conditions.     Depression   31 yo female who presents today for evaluation of depressed mood and/or loss of interests/pleasure most days, usually >2 days/week. There is no imminent risk of serious self-harm/suicide. PHQ9 obtained in clinic today revealed a score of 22 indicating severe depressive episode. No medications, comorbid medical conditions or substance abuse causing depression. Denies hx of treatment resistant depression, current frida, hypomania or psychosis. No personal or FMHx of bipolar disorder.     Abby reports that she has been on Zoloft 100 mg daily in the past for depression and anxiety which was very beneficial for her.       ROS   + Anxiety and depression.  Denies any recent fevers or chills. No nausea or vomiting. No chest pains or shortness of breath.     No Known Allergies    Current medicines (including changes today)  Current Outpatient  Medications   Medication Sig Dispense Refill   • sertraline (ZOLOFT) 100 MG Tab Take 1 Tab by mouth every day. 30 Tab 1   • TRULANCE 3 MG Tab Take 1 Tab by mouth every day.     • omeprazole (PRILOSEC) 20 MG delayed-release capsule Take 1 Cap by mouth every day. 30 Cap 0   • amoxicillin-clavulanate (AUGMENTIN) 875-125 MG Tab Take 1 Tab by mouth. FOR 7 DAYS     • ondansetron (ZOFRAN) 4 MG Tab tablet TK 1 T PO Q 4 H PRF NAUSEA/VOMITING FOR 7 DAYS     • SUMAtriptan (IMITREX) 50 MG Tab Take 1 Tab by mouth Once PRN for Migraine for up to 1 dose. 9 Tab 2   • dicyclomine (BENTYL) 20 MG Tab TAKE 1 TABLET BY MOUTH EVERY 6 HOURS 120 Tab 0   • ethinyl estradiol-etonogestrel (NUVARING) 0.12-0.015 MG/24HR vaginal ring INSERT 1 RING VAGINALLY AS DIRECTED. REMOVE AFTER 3 WEEKS & WAIT 7 DAYS BEFORE INSERTING A NEW RING 1 Each 6   • diclofenac DR (VOLTAREN) 75 MG Tablet Delayed Response Take 1 Tab by mouth 2 times a day. 30 Tab 0   • cyclobenzaprine (FLEXERIL) 10 MG Tab Take 1 Tab by mouth 3 times a day as needed. 30 Tab 0   • Blood Glucose Monitoring Suppl (ONE TOUCH ULTRA 2) w/Device Kit USE TO TEST BLOOD SUGAR IN THE MORNING BEFORE BREAKFAST OR DRINK ANYTHING     • polyethylene glycol 3350 (MIRALAX) 17 GM/SCOOP Powder Take 17 g by mouth every day. 225 g 3   • Alcohol Swabs (ALCOHOL PADS) 70 % Pads      • ONETOUCH ULTRA strip      • Diclofenac Sodium (VOLTAREN) 1 % Gel Apply 1 Application to skin as directed as needed (Apply's on left wrist).     • fluticasone (FLONASE) 50 MCG/ACT nasal spray Spray 1 Spray in nose every evening.     • multivitamin (THERAGRAN) Tab Take 1 Tab by mouth every day.     • sulfamethoxazole-trimethoprim (BACTRIM DS) 800-160 MG tablet Take 1 Tab by mouth 2 times a day. 10 Tab 0     No current facility-administered medications for this visit.        Patient Active Problem List    Diagnosis Date Noted   • Chronic abdominal pain 08/11/2020   • Fatigue 07/15/2020   • Left wrist pain 04/24/2020   • Weight loss  "04/24/2020   • Hip pain, left 05/08/2019   • Environmental allergies 05/08/2019   • Neutropenia (HCC) 10/03/2017   • Migraine without aura and without status migrainosus, not intractable 09/11/2017   • Anxiety and depression 09/11/2017   • Insomnia disorder 09/11/2017   • Encounter for birth control 09/11/2017       Family History   Problem Relation Age of Onset   • Cancer Father    • Cancer Maternal Aunt         Breast cancer   • Cancer Maternal Grandmother         Breast cancer   • Arthritis Maternal Grandmother    • Cancer Paternal Grandfather         Prostate CA       She  has a past medical history of Anxiety and depression and Migraine.  She  has a past surgical history that includes primary c section (01/31/2019).       Objective:   /74   Pulse 82   Temp 36.2 °C (97.2 °F) Comment: pr pt.  Ht 1.575 m (5' 2\")   Wt 50.8 kg (112 lb)   BMI 20.49 kg/m²     Physical Exam:  Constitutional: Alert, no distress, well-groomed.  Skin: No rashes in visible areas.  Eye: Round. Conjunctiva clear, lids normal. No icterus.   ENMT: Lips pink without lesions, good dentition, moist mucous membranes. Phonation normal.  Neck: No masses, no thyromegaly. Moves freely without pain.  Respiratory: Unlabored respiratory effort, no cough or audible wheeze  Psych: Alert and oriented x3, normal affect and mood.       Assessment and Plan:   The following treatment plan was discussed:     1. Anxiety and depression  - This is a chronic condition.   - No imminent risk of suicidal or homicidal ideation.   - PHQ-9 performed in clinic today revealed a score of 22 indicating severe depression and a DESI-7 score of 15 indicating severe anxiety.  - The patient would benefit greatly from combination therapy with a medication and CBT.  However, the patient would like to forego therapy at this time and trial a medication.  She has taken Zoloft 100 mg in the past and tolerated this well.  This was very beneficial for her.  -Plan: Start taking " Zoloft 100 mg daily.  Patient has been educated use potential side effect profile of this medication.  I will plan to check in with the patient via [employer] in 2 weeks to see how she is tolerating the medication.  Follow-up in 4 weeks for med check.    - Sertraline (ZOLOFT) 100 MG Tab; Take 1 Tab by mouth every day.  Dispense: 30 Tab; Refill: 1      Follow-up: Return in about 4 weeks (around 1/19/2021) for Med check.

## 2020-12-23 NOTE — ASSESSMENT & PLAN NOTE
Depression   31 yo female who presents today for evaluation of depressed mood and/or loss of interests/pleasure most days, usually >2 days/week. There is no imminent risk of serious self-harm/suicide. PHQ9 obtained in clinic today revealed a score of 22 indicating severe depressive episode. No medications, comorbid medical conditions or substance abuse causing depression. Denies hx of treatment resistant depression, current frida, hypomania or psychosis. No personal or FMHx of bipolar disorder.     Anxiety  Abby presents today for evaluation of excessive anxiety and worry. Patient finds it difficult to control the worry and is easily fatigued, difficulty concentrating or mind going blank, restlessness or feeling keyed up or on edge, irritability, muscle tension, sleep disturbances. This has affected her social/occupational areas of functioning. DESI 7 performed in clinic today with a score of 15 indicating severe anxiety. There is no imminent risk of serious self-harm/suicide. No current substance abuse. No Hx of known thyroid disorder. No Hx of psychiatric conditions.     Abby reports that she has been on Zoloft 100 mg daily in the past for depression and anxiety which was very beneficial for her.

## 2021-01-05 DIAGNOSIS — R07.9 CHEST PAIN, UNSPECIFIED TYPE: ICD-10-CM

## 2021-01-05 DIAGNOSIS — R20.0 LEFT ARM NUMBNESS: ICD-10-CM

## 2021-01-06 DIAGNOSIS — Z12.31 ENCOUNTER FOR SCREENING MAMMOGRAM FOR MALIGNANT NEOPLASM OF BREAST: ICD-10-CM

## 2021-01-06 DIAGNOSIS — R07.9 CHEST PAIN, UNSPECIFIED TYPE: ICD-10-CM

## 2021-01-06 DIAGNOSIS — R94.31 ABNORMAL EKG: ICD-10-CM

## 2021-01-13 ENCOUNTER — TELEPHONE (OUTPATIENT)
Dept: CARDIOLOGY | Facility: MEDICAL CENTER | Age: 31
End: 2021-01-13

## 2021-01-13 NOTE — TELEPHONE ENCOUNTER
Called pt at [phone] and she stated she has gotten labs done at [facility]) as well as [place] ER. I will request records from [facility] now. The pt was not found in the [facility] database when I called and looked online.     She had also stated that she has gotten an EKG done at [facility] on [facility] and that's where the rest of records are as well. Records sent to scan.

## 2021-01-14 ENCOUNTER — HOSPITAL ENCOUNTER (OUTPATIENT)
Dept: RADIOLOGY | Facility: MEDICAL CENTER | Age: 31
End: 2021-01-14
Attending: PHYSICIAN ASSISTANT
Payer: MEDICAID

## 2021-01-14 DIAGNOSIS — Z12.31 ENCOUNTER FOR SCREENING MAMMOGRAM FOR MALIGNANT NEOPLASM OF BREAST: ICD-10-CM

## 2021-01-14 PROCEDURE — 76642 ULTRASOUND BREAST LIMITED: CPT | Mod: LT

## 2021-01-14 PROCEDURE — G0279 TOMOSYNTHESIS, MAMMO: HCPCS

## 2021-01-18 ENCOUNTER — OFFICE VISIT (OUTPATIENT)
Dept: CARDIOLOGY | Facility: MEDICAL CENTER | Age: 31
End: 2021-01-18
Payer: MEDICAID

## 2021-01-18 VITALS
RESPIRATION RATE: 16 BRPM | OXYGEN SATURATION: 93 % | BODY MASS INDEX: 21.71 KG/M2 | HEART RATE: 92 BPM | HEIGHT: 62 IN | DIASTOLIC BLOOD PRESSURE: 70 MMHG | WEIGHT: 118 LBS | SYSTOLIC BLOOD PRESSURE: 120 MMHG

## 2021-01-18 DIAGNOSIS — M94.0 COSTOCHONDRITIS: ICD-10-CM

## 2021-01-18 DIAGNOSIS — F32.A ANXIETY AND DEPRESSION: ICD-10-CM

## 2021-01-18 DIAGNOSIS — F41.9 ANXIETY AND DEPRESSION: ICD-10-CM

## 2021-01-18 DIAGNOSIS — R07.89 ATYPICAL CHEST PAIN: ICD-10-CM

## 2021-01-18 DIAGNOSIS — R07.89 OTHER CHEST PAIN: ICD-10-CM

## 2021-01-18 PROCEDURE — 99203 OFFICE O/P NEW LOW 30 MIN: CPT | Performed by: INTERNAL MEDICINE

## 2021-01-18 PROCEDURE — 93000 ELECTROCARDIOGRAM COMPLETE: CPT | Performed by: INTERNAL MEDICINE

## 2021-01-18 RX ORDER — IBUPROFEN 800 MG/1
800 TABLET ORAL EVERY 8 HOURS PRN
COMMUNITY
End: 2022-03-07

## 2021-01-18 RX ORDER — ALPRAZOLAM 0.5 MG/1
0.5 TABLET ORAL
COMMUNITY
Start: 2021-01-05 | End: 2021-02-08 | Stop reason: SDUPTHER

## 2021-01-18 ASSESSMENT — FIBROSIS 4 INDEX: FIB4 SCORE: 0.85

## 2021-01-18 NOTE — PROGRESS NOTES
Chief Complaint   Patient presents with   • Abnormal EKG       Subjective:   Abby Call is a 30 y.o. female who presents today for initial consultation regarding chest discomfort referred by Lulú Rodgers.  On January 5, 2021 she developed sharp pressure-like left-sided chest discomfort that evening.  She was advised to see if it was present in the morning which it was and then advised to go to the emergency department.  It had been present for over 12 hours and emergency department evaluation was unremarkable.  She is a non-smoker does not drink excessively or do drugs and has a history of anxiety/depression but no other medical problems.  She does not have a family history of precocious CAD.  She has tenderness to palpation of left costochondral margin.  She notes her symptoms have been slowly improving but are still apt to occur occasionally but less frequently.    Past Medical History:   Diagnosis Date   • Anxiety and depression    • Migraine      Past Surgical History:   Procedure Laterality Date   • PRIMARY C SECTION  01/31/2019     Family History   Problem Relation Age of Onset   • Cancer Father    • Cancer Maternal Aunt         Breast cancer   • Cancer Maternal Grandmother         Breast cancer   • Arthritis Maternal Grandmother    • Cancer Paternal Grandfather         Prostate CA     Social History     Socioeconomic History   • Marital status: Single     Spouse name: Not on file   • Number of children: Not on file   • Years of education: Not on file   • Highest education level: Not on file   Occupational History   • Not on file   Social Needs   • Financial resource strain: Not on file   • Food insecurity     Worry: Not on file     Inability: Not on file   • Transportation needs     Medical: Not on file     Non-medical: Not on file   Tobacco Use   • Smoking status: Never Smoker   • Smokeless tobacco: Never Used   Substance and Sexual Activity   • Alcohol use: Yes     Alcohol/week: 4.2 oz     Types:  7 Glasses of wine per week     Drinks per session: 1 or 2     Comment: on the weekends   • Drug use: No   • Sexual activity: Not Currently     Partners: Male   Lifestyle   • Physical activity     Days per week: Not on file     Minutes per session: Not on file   • Stress: Not on file   Relationships   • Social connections     Talks on phone: Not on file     Gets together: Not on file     Attends Baptism service: Not on file     Active member of club or organization: Not on file     Attends meetings of clubs or organizations: Not on file     Relationship status: Not on file   • Intimate partner violence     Fear of current or ex partner: Not on file     Emotionally abused: Not on file     Physically abused: Not on file     Forced sexual activity: Not on file   Other Topics Concern   • Not on file   Social History Narrative   • Not on file     No Known Allergies  Outpatient Encounter Medications as of 1/18/2021   Medication Sig Dispense Refill   • ibuprofen (MOTRIN) 800 MG Tab Take 800 mg by mouth every 8 hours as needed.     • ondansetron (ZOFRAN) 4 MG Tab tablet TK 1 T PO Q 4 H PRF NAUSEA/VOMITING FOR 7 DAYS     • SUMAtriptan (IMITREX) 50 MG Tab Take 1 Tab by mouth Once PRN for Migraine for up to 1 dose. 9 Tab 2   • ethinyl estradiol-etonogestrel (NUVARING) 0.12-0.015 MG/24HR vaginal ring INSERT 1 RING VAGINALLY AS DIRECTED. REMOVE AFTER 3 WEEKS & WAIT 7 DAYS BEFORE INSERTING A NEW RING 1 Each 6   • cyclobenzaprine (FLEXERIL) 10 MG Tab Take 1 Tab by mouth 3 times a day as needed. 30 Tab 0   • multivitamin (THERAGRAN) Tab Take 1 Tab by mouth every day.     • ALPRAZolam (XANAX) 0.5 MG Tab Take 0.5 mg by mouth. AS NEEDED     • TRULANCE 3 MG Tab Take 1 Tab by mouth every day.     • sertraline (ZOLOFT) 100 MG Tab Take 1 Tab by mouth every day. (Patient not taking: Reported on 1/18/2021) 30 Tab 1   • omeprazole (PRILOSEC) 20 MG delayed-release capsule Take 1 Cap by mouth every day. (Patient not taking: Reported on  "1/18/2021) 30 Cap 0   • amoxicillin-clavulanate (AUGMENTIN) 875-125 MG Tab Take 1 Tab by mouth. FOR 7 DAYS     • dicyclomine (BENTYL) 20 MG Tab TAKE 1 TABLET BY MOUTH EVERY 6 HOURS (Patient not taking: Reported on 1/18/2021) 120 Tab 0   • diclofenac DR (VOLTAREN) 75 MG Tablet Delayed Response Take 1 Tab by mouth 2 times a day. (Patient not taking: Reported on 1/18/2021) 30 Tab 0   • Blood Glucose Monitoring Suppl (ONE TOUCH ULTRA 2) w/Device Kit USE TO TEST BLOOD SUGAR IN THE MORNING BEFORE BREAKFAST OR DRINK ANYTHING     • polyethylene glycol 3350 (MIRALAX) 17 GM/SCOOP Powder Take 17 g by mouth every day. (Patient not taking: Reported on 1/18/2021) 225 g 3   • Alcohol Swabs (ALCOHOL PADS) 70 % Pads      • ONETOUCH ULTRA strip      • Diclofenac Sodium (VOLTAREN) 1 % Gel Apply 1 Application to skin as directed as needed (Apply's on left wrist).     • fluticasone (FLONASE) 50 MCG/ACT nasal spray Spray 1 Spray in nose every evening.     • sulfamethoxazole-trimethoprim (BACTRIM DS) 800-160 MG tablet Take 1 Tab by mouth 2 times a day. (Patient not taking: Reported on 1/18/2021) 10 Tab 0     No facility-administered encounter medications on file as of 1/18/2021.      Review of Systems   All other systems reviewed and are negative.       Objective:   /70 (BP Location: Left arm, Patient Position: Sitting, BP Cuff Size: Adult)   Pulse 92   Resp 16   Ht 1.575 m (5' 2\")   Wt 53.5 kg (118 lb)   SpO2 93%   BMI 21.58 kg/m²     Physical Exam   Constitutional: She is oriented to person, place, and time. She appears well-developed and well-nourished. No distress.   Thin  Athletic appearing     HENT:   Head: Normocephalic and atraumatic.   Right Ear: External ear normal.   Left Ear: External ear normal.   Mouth/Throat: No oropharyngeal exudate.   Eyes: Pupils are equal, round, and reactive to light. Conjunctivae and EOM are normal. Right eye exhibits no discharge. Left eye exhibits no discharge. No scleral icterus. "   Neck: Normal range of motion. Neck supple. No JVD present. No tracheal deviation present. No thyromegaly present.   Cardiovascular: Normal rate, regular rhythm, S1 normal, S2 normal, normal heart sounds and intact distal pulses. PMI is not displaced. Exam reveals no gallop, no S3, no S4 and no friction rub.   No murmur heard.  Pulses:       Carotid pulses are 2+ on the right side and 2+ on the left side.       Radial pulses are 2+ on the right side and 2+ on the left side.        Popliteal pulses are 2+ on the right side and 2+ on the left side.        Dorsalis pedis pulses are 2+ on the right side and 2+ on the left side.        Posterior tibial pulses are 2+ on the right side and 2+ on the left side.   Pulmonary/Chest: Effort normal and breath sounds normal. No respiratory distress. She has no wheezes. She has no rales. She exhibits no tenderness.   Tenderness to palpation of the left costochondral margin   Abdominal: Soft. Bowel sounds are normal. She exhibits no distension. There is no abdominal tenderness.   Musculoskeletal: Normal range of motion.         General: No tenderness or edema.   Neurological: She is alert and oriented to person, place, and time. No cranial nerve deficit (Cranial nerves II through XII grossly intact).   Skin: Skin is warm and dry. No rash noted. She is not diaphoretic. No erythema.   Psychiatric: She has a normal mood and affect. Her behavior is normal. Judgment and thought content normal.   Vitals reviewed.    LABS:  No results found for: CHOLSTRLTOT, LDL, HDL, TRIGLYCERIDE    Lab Results   Component Value Date/Time    WBC 6.9 08/10/2020 03:07 PM    RBC 4.48 08/10/2020 03:07 PM    HEMOGLOBIN 13.9 08/10/2020 03:07 PM    HEMATOCRIT 41.4 08/10/2020 03:07 PM    MCV 92.4 08/10/2020 03:07 PM    NEUTSPOLYS 42.30 (L) 08/10/2020 03:07 PM    LYMPHOCYTES 48.80 (H) 08/10/2020 03:07 PM    MONOCYTES 8.00 08/10/2020 03:07 PM    EOSINOPHILS 0.30 08/10/2020 03:07 PM    BASOPHILS 0.30 08/10/2020  03:07 PM     Lab Results   Component Value Date/Time    SODIUM 137 08/10/2020 03:07 PM    POTASSIUM 3.7 08/10/2020 03:07 PM    CHLORIDE 104 08/10/2020 03:07 PM    CO2 24 08/10/2020 03:07 PM    GLUCOSE 75 08/10/2020 03:07 PM    BUN 12 08/10/2020 03:07 PM    CREATININE 0.83 08/10/2020 03:07 PM     Lab Results   Component Value Date    HBA1C 5.4 07/15/2020      Lab Results   Component Value Date/Time    ALKPHOSPHAT 34 08/10/2020 03:07 PM    ASTSGOT 18 08/10/2020 03:07 PM    ALTSGPT 11 08/10/2020 03:07 PM    TBILIRUBIN 1.3 08/10/2020 03:07 PM      No results found for: BNPBTYPENAT   No results found for: TSH  No results found for: PROTHROMBTM, INR       Records from [facility] reviewed today 1/18/2021     EKG (1/18/2021):  I have personally reviewed the EKG this visit and discussed with the patient.  Sinus rhythm 75 bpm possible left atrial abnormality.      Assessment:     1. Costochondritis  EC-ECHOCARDIOGRAM COMPLETE W/O CONT   2. Other chest pain  EKG   3. Atypical chest pain  EC-ECHOCARDIOGRAM COMPLETE W/O CONT   4. Anxiety and depression         Medical Decision Making:  Today's Assessment / Status / Plan:     She has symptoms consistent with a musculoskeletal chest pain syndrome and physical exam findings consistent with likely left costochondritis.  Recommend NSAID therapy.  EKG is largely normal but I recommend an echocardiogram to evaluate the possible left atrial abnormality and rule out any other possibility for congenital abnormalities.  This is unremarkable she should follow-up with cardiology as needed.

## 2021-01-20 LAB — EKG IMPRESSION: NORMAL

## 2021-02-08 ENCOUNTER — TELEMEDICINE (OUTPATIENT)
Dept: MEDICAL GROUP | Facility: MEDICAL CENTER | Age: 31
End: 2021-02-08
Attending: PHYSICIAN ASSISTANT
Payer: MEDICAID

## 2021-02-08 ENCOUNTER — HOSPITAL ENCOUNTER (OUTPATIENT)
Dept: CARDIOLOGY | Facility: MEDICAL CENTER | Age: 31
End: 2021-02-08
Attending: INTERNAL MEDICINE
Payer: MEDICAID

## 2021-02-08 VITALS — BODY MASS INDEX: 21.71 KG/M2 | HEIGHT: 62 IN | WEIGHT: 118 LBS

## 2021-02-08 DIAGNOSIS — F41.9 ANXIETY AND DEPRESSION: ICD-10-CM

## 2021-02-08 DIAGNOSIS — R07.89 ATYPICAL CHEST PAIN: ICD-10-CM

## 2021-02-08 DIAGNOSIS — M94.0 COSTOCHONDRITIS: ICD-10-CM

## 2021-02-08 DIAGNOSIS — F32.A ANXIETY AND DEPRESSION: ICD-10-CM

## 2021-02-08 PROCEDURE — 93306 TTE W/DOPPLER COMPLETE: CPT

## 2021-02-08 PROCEDURE — 99214 OFFICE O/P EST MOD 30 MIN: CPT | Mod: CR | Performed by: PHYSICIAN ASSISTANT

## 2021-02-08 RX ORDER — ALPRAZOLAM 0.5 MG/1
0.5 TABLET ORAL NIGHTLY PRN
Qty: 30 TAB | Refills: 0 | Status: SHIPPED | OUTPATIENT
Start: 2021-02-08 | End: 2021-03-10

## 2021-02-08 ASSESSMENT — FIBROSIS 4 INDEX: FIB4 SCORE: 0.85

## 2021-02-09 LAB
LV EJECT FRACT  99904: 70
LV EJECT FRACT MOD 2C 99903: 75.39
LV EJECT FRACT MOD 4C 99902: 70.5
LV EJECT FRACT MOD BP 99901: 73.21

## 2021-02-09 PROCEDURE — 93306 TTE W/DOPPLER COMPLETE: CPT | Mod: 26 | Performed by: INTERNAL MEDICINE

## 2021-02-09 NOTE — ASSESSMENT & PLAN NOTE
Abby presents today for follow-up regarding anxiety and depression.  She initially was on 100 mg of Zoloft daily but reports side effects with this high of a dose.  We decreased her dose to 50 mg daily which she has been tolerating well and feels has been very beneficial for her depression and anxiety.  However, she states that her anxiety continues to be prominent.  She has noticed that when her anxiety is out of control she tends to pick at her eyelashes or eyebrows. She resorts to drinking alcohol to help alleviate her anxiety and drinks approximately 4 glasses of wine a week.  She reports that she was prescribed a minimal amount of alprazolam once discharged from the hospital in which she uses very sparingly.  She states when the anxiety is really bad she will take the alprazolam which resolves her symptoms and this keeps her from drinking an excessive amount of alcohol.  She denies SI or HI.

## 2021-02-09 NOTE — PROGRESS NOTES
Virtual Visit: Established Patient   This visit was conducted via Zoom using secure and encrypted videoconferencing technology. The patient was in a private location in the state of [place].    The patient's identity was confirmed and verbal consent was obtained for this virtual visit.    Subjective:   CC:   Chief Complaint   Patient presents with   • Follow-Up     Anxiety & Depression      Abby Call is a 30 y.o. female presenting for evaluation and management of:    Anxiety and depression  Abby presents today for follow-up regarding anxiety and depression.  She initially was on 100 mg of Zoloft daily but reports side effects with this high of a dose.  We decreased her dose to 50 mg daily which she has been tolerating well and feels has been very beneficial for her depression and anxiety.  However, she states that her anxiety continues to be prominent.  She has noticed that when her anxiety is out of control she tends to pick at her eyelashes or eyebrows. She resorts to drinking alcohol to help alleviate her anxiety and drinks approximately 4 glasses of wine a week.  She reports that she was prescribed a minimal amount of alprazolam once discharged from the hospital in which she uses very sparingly.  She states when the anxiety is really bad she will take the alprazolam which resolves her symptoms and this keeps her from drinking an excessive amount of alcohol.  She denies SI or HI.    ROS   + Anxiety and depression.  Denies any recent fevers or chills. No nausea or vomiting. No chest pains or shortness of breath.     No Known Allergies    Current medicines (including changes today)  Current Outpatient Medications   Medication Sig Dispense Refill   • sertraline (ZOLOFT) 50 MG Tab Take 1 Tab by mouth every day. 30 Tab 11   • ALPRAZolam (XANAX) 0.5 MG Tab Take 1 Tab by mouth at bedtime as needed for up to 30 days. 30 Tab 0   • ibuprofen (MOTRIN) 800 MG Tab Take 800 mg by mouth every 8 hours as needed.      • TRULANCE 3 MG Tab Take 1 Tab by mouth every day.     • omeprazole (PRILOSEC) 20 MG delayed-release capsule Take 1 Cap by mouth every day. (Patient not taking: Reported on 1/18/2021) 30 Cap 0   • amoxicillin-clavulanate (AUGMENTIN) 875-125 MG Tab Take 1 Tab by mouth. FOR 7 DAYS     • ondansetron (ZOFRAN) 4 MG Tab tablet TK 1 T PO Q 4 H PRF NAUSEA/VOMITING FOR 7 DAYS     • SUMAtriptan (IMITREX) 50 MG Tab Take 1 Tab by mouth Once PRN for Migraine for up to 1 dose. 9 Tab 2   • dicyclomine (BENTYL) 20 MG Tab TAKE 1 TABLET BY MOUTH EVERY 6 HOURS (Patient not taking: Reported on 1/18/2021) 120 Tab 0   • ethinyl estradiol-etonogestrel (NUVARING) 0.12-0.015 MG/24HR vaginal ring INSERT 1 RING VAGINALLY AS DIRECTED. REMOVE AFTER 3 WEEKS & WAIT 7 DAYS BEFORE INSERTING A NEW RING 1 Each 6   • diclofenac DR (VOLTAREN) 75 MG Tablet Delayed Response Take 1 Tab by mouth 2 times a day. (Patient not taking: Reported on 1/18/2021) 30 Tab 0   • cyclobenzaprine (FLEXERIL) 10 MG Tab Take 1 Tab by mouth 3 times a day as needed. 30 Tab 0   • Blood Glucose Monitoring Suppl (ONE TOUCH ULTRA 2) w/Device Kit USE TO TEST BLOOD SUGAR IN THE MORNING BEFORE BREAKFAST OR DRINK ANYTHING     • polyethylene glycol 3350 (MIRALAX) 17 GM/SCOOP Powder Take 17 g by mouth every day. (Patient not taking: Reported on 1/18/2021) 225 g 3   • Alcohol Swabs (ALCOHOL PADS) 70 % Pads      • ONETOUCH ULTRA strip      • Diclofenac Sodium (VOLTAREN) 1 % Gel Apply 1 Application to skin as directed as needed (Apply's on left wrist).     • fluticasone (FLONASE) 50 MCG/ACT nasal spray Spray 1 Spray in nose every evening.     • multivitamin (THERAGRAN) Tab Take 1 Tab by mouth every day.     • sulfamethoxazole-trimethoprim (BACTRIM DS) 800-160 MG tablet Take 1 Tab by mouth 2 times a day. (Patient not taking: Reported on 1/18/2021) 10 Tab 0     No current facility-administered medications for this visit.        Patient Active Problem List    Diagnosis Date Noted   •  "Chronic abdominal pain 08/11/2020   • Fatigue 07/15/2020   • Left wrist pain 04/24/2020   • Weight loss 04/24/2020   • Hip pain, left 05/08/2019   • Environmental allergies 05/08/2019   • Neutropenia (HCC) 10/03/2017   • Migraine without aura and without status migrainosus, not intractable 09/11/2017   • Anxiety and depression 09/11/2017   • Insomnia disorder 09/11/2017   • Encounter for birth control 09/11/2017       Family History   Problem Relation Age of Onset   • Cancer Father    • Cancer Maternal Aunt         Breast cancer   • Cancer Maternal Grandmother         Breast cancer   • Arthritis Maternal Grandmother    • Cancer Paternal Grandfather         Prostate CA       She  has a past medical history of Anxiety and depression and Migraine.  She  has a past surgical history that includes primary c section (01/31/2019).       Objective:   Ht 1.575 m (5' 2\")   Wt 53.5 kg (118 lb)   BMI 21.58 kg/m²     Physical Exam:  Constitutional: Alert, no distress, well-groomed.  Skin: No rashes in visible areas.  Eye: Round. Conjunctiva clear, lids normal. No icterus.   ENMT: Lips pink without lesions, good dentition, moist mucous membranes. Phonation normal.  Neck: No masses, no thyromegaly. Moves freely without pain.  Respiratory: Unlabored respiratory effort, no cough or audible wheeze  Psych: Alert and oriented x3, normal affect and mood.       Assessment and Plan:   The following treatment plan was discussed:     1. Anxiety and depression  - This is a chronic improving condition.  - No SI or HI.  - Plan: Continue on Zoloft 50 mg daily.  Start alprazolam 0.5 mg as needed for severe anxiety.  Patient has been educated on the use and potential side effect profile of this medication.  Follow-up in 4 weeks for medication check.  ED precautions discussed.  - sertraline (ZOLOFT) 50 MG Tab; Take 1 Tab by mouth every day.  Dispense: 30 Tab; Refill: 11  - ALPRAZolam (XANAX) 0.5 MG Tab; Take 1 Tab by mouth at bedtime as needed " for up to 30 days.  Dispense: 30 Tab; Refill: 0    Follow-up: Return in about 4 weeks (around 3/8/2021) for Med check.

## 2021-02-11 ENCOUNTER — TELEMEDICINE (OUTPATIENT)
Dept: CARDIOLOGY | Facility: MEDICAL CENTER | Age: 31
End: 2021-02-11
Payer: MEDICAID

## 2021-02-11 VITALS
WEIGHT: 122 LBS | SYSTOLIC BLOOD PRESSURE: 122 MMHG | DIASTOLIC BLOOD PRESSURE: 82 MMHG | HEIGHT: 62 IN | BODY MASS INDEX: 22.45 KG/M2 | HEART RATE: 81 BPM

## 2021-02-11 DIAGNOSIS — M94.0 COSTOCHONDRITIS: ICD-10-CM

## 2021-02-11 DIAGNOSIS — I49.3 PVC'S (PREMATURE VENTRICULAR CONTRACTIONS): ICD-10-CM

## 2021-02-11 PROCEDURE — 99213 OFFICE O/P EST LOW 20 MIN: CPT | Mod: CR | Performed by: INTERNAL MEDICINE

## 2021-02-11 ASSESSMENT — FIBROSIS 4 INDEX: FIB4 SCORE: 0.85

## 2021-02-11 NOTE — PROGRESS NOTES
Chief Complaint   Patient presents with   • Chest Pain   • Follow-Up       Subjective:   Abby Call is a 30 y.o. female who presents today for follow-up after initial diagnosis of costochondritis and PVCs with an overlay of anxiety.  She has been using it as needed nonsteroidal anti-inflammatories to good effect and has been recently prescribed antianxiety medicine by her PCP.  Overall the symptoms have improved but are intermittent and exacerbated by stress.  Echocardiogram returns completely normal.    Past Medical History:   Diagnosis Date   • Anxiety and depression    • Migraine      Past Surgical History:   Procedure Laterality Date   • PRIMARY C SECTION  01/31/2019     Family History   Problem Relation Age of Onset   • Cancer Father    • Cancer Maternal Aunt         Breast cancer   • Cancer Maternal Grandmother         Breast cancer   • Arthritis Maternal Grandmother    • Cancer Paternal Grandfather         Prostate CA     Social History     Socioeconomic History   • Marital status: Single     Spouse name: Not on file   • Number of children: Not on file   • Years of education: Not on file   • Highest education level: Not on file   Occupational History   • Not on file   Tobacco Use   • Smoking status: Never Smoker   • Smokeless tobacco: Never Used   Substance and Sexual Activity   • Alcohol use: Yes     Alcohol/week: 2.4 oz     Types: 4 Glasses of wine per week     Comment: on the weekends   • Drug use: No   • Sexual activity: Not Currently     Partners: Male   Other Topics Concern   • Not on file   Social History Narrative   • Not on file     Social Determinants of Health     Financial Resource Strain:    • Difficulty of Paying Living Expenses:    Food Insecurity:    • Worried About Running Out of Food in the Last Year:    • Ran Out of Food in the Last Year:    Transportation Needs:    • Lack of Transportation (Medical):    • Lack of Transportation (Non-Medical):    Physical Activity:    • Days of  Exercise per Week:    • Minutes of Exercise per Session:    Stress:    • Feeling of Stress :    Social Connections:    • Frequency of Communication with Friends and Family:    • Frequency of Social Gatherings with Friends and Family:    • Attends Taoist Services:    • Active Member of Clubs or Organizations:    • Attends Club or Organization Meetings:    • Marital Status:    Intimate Partner Violence:    • Fear of Current or Ex-Partner:    • Emotionally Abused:    • Physically Abused:    • Sexually Abused:      No Known Allergies  Outpatient Encounter Medications as of 2/11/2021   Medication Sig Dispense Refill   • sertraline (ZOLOFT) 50 MG Tab Take 1 Tab by mouth every day. 30 Tab 11   • ALPRAZolam (XANAX) 0.5 MG Tab Take 1 Tab by mouth at bedtime as needed for up to 30 days. 30 Tab 0   • omeprazole (PRILOSEC) 20 MG delayed-release capsule Take 1 Cap by mouth every day. 30 Cap 0   • ondansetron (ZOFRAN) 4 MG Tab tablet TK 1 T PO Q 4 H PRF NAUSEA/VOMITING FOR 7 DAYS     • SUMAtriptan (IMITREX) 50 MG Tab Take 1 Tab by mouth Once PRN for Migraine for up to 1 dose. 9 Tab 2   • dicyclomine (BENTYL) 20 MG Tab TAKE 1 TABLET BY MOUTH EVERY 6 HOURS 120 Tab 0   • ethinyl estradiol-etonogestrel (NUVARING) 0.12-0.015 MG/24HR vaginal ring INSERT 1 RING VAGINALLY AS DIRECTED. REMOVE AFTER 3 WEEKS & WAIT 7 DAYS BEFORE INSERTING A NEW RING 1 Each 6   • diclofenac DR (VOLTAREN) 75 MG Tablet Delayed Response Take 1 Tab by mouth 2 times a day. 30 Tab 0   • cyclobenzaprine (FLEXERIL) 10 MG Tab Take 1 Tab by mouth 3 times a day as needed. 30 Tab 0   • Blood Glucose Monitoring Suppl (ONE TOUCH ULTRA 2) w/Device Kit USE TO TEST BLOOD SUGAR IN THE MORNING BEFORE BREAKFAST OR DRINK ANYTHING     • polyethylene glycol 3350 (MIRALAX) 17 GM/SCOOP Powder Take 17 g by mouth every day. 225 g 3   • Alcohol Swabs (ALCOHOL PADS) 70 % Pads      • fluticasone (FLONASE) 50 MCG/ACT nasal spray Spray 1 Spray in nose every evening.     • ibuprofen  "(MOTRIN) 800 MG Tab Take 800 mg by mouth every 8 hours as needed.     • TRULANCE 3 MG Tab Take 1 Tab by mouth every day.     • amoxicillin-clavulanate (AUGMENTIN) 875-125 MG Tab Take 1 Tab by mouth. FOR 7 DAYS     • ONETOUCH ULTRA strip      • Diclofenac Sodium (VOLTAREN) 1 % Gel Apply 1 Application to skin as directed as needed (Apply's on left wrist).     • multivitamin (THERAGRAN) Tab Take 1 Tab by mouth every day.     • sulfamethoxazole-trimethoprim (BACTRIM DS) 800-160 MG tablet Take 1 Tab by mouth 2 times a day. (Patient not taking: Reported on 1/18/2021) 10 Tab 0     No facility-administered encounter medications on file as of 2/11/2021.     Review of Systems   All other systems reviewed and are negative.       Objective:   /82 (BP Location: Right arm, Patient Position: Sitting, BP Cuff Size: Adult)   Pulse 81   Ht 1.575 m (5' 2\")   Wt 55.3 kg (122 lb)   BMI 22.31 kg/m²     Physical Exam   Constitutional: She is oriented to person, place, and time. She appears well-developed and well-nourished. No distress.   HENT:   Head: Normocephalic and atraumatic.   Mouth/Throat: Oropharynx is clear and moist. No oropharyngeal exudate.   Eyes: Pupils are equal, round, and reactive to light. Conjunctivae and EOM are normal. No scleral icterus.   Neck: No JVD present.   Pulmonary/Chest: Effort normal. No stridor. No respiratory distress.   Musculoskeletal:         General: No edema.   Neurological: She is alert and oriented to person, place, and time. No cranial nerve deficit.   Skin: Skin is dry. No rash noted. She is not diaphoretic. No erythema. No pallor.   Psychiatric: She has a normal mood and affect. Her behavior is normal. Judgment and thought content normal.   Nursing note and vitals reviewed.      Assessment:     1. Costochondritis     2. PVC's (premature ventricular contractions)         Medical Decision Making:  Today's Assessment / Status / Plan:     Very normal cardiac evaluation.  Costochondritis " is resolving.  As needed analgesic therapy warm and cold compresses and increase physical activity.  Stress and anxiety management per PCP.  We discussed these things.  Follow-up with cardiology as needed.    This evaluation was conducted via Zoom using secure and encrypted videoconferencing technology. The patient was in a private location in the [facility].    The patient's identity was confirmed and verbal consent was obtained for this virtual visit.

## 2021-02-16 DIAGNOSIS — K59.00 CONSTIPATION, UNSPECIFIED CONSTIPATION TYPE: ICD-10-CM

## 2021-02-16 RX ORDER — POLYETHYLENE GLYCOL 3350 17 G/17G
17 POWDER, FOR SOLUTION ORAL DAILY
Qty: 225 G | Refills: 3 | Status: SHIPPED | OUTPATIENT
Start: 2021-02-16 | End: 2023-06-23

## 2021-03-08 ENCOUNTER — OFFICE VISIT (OUTPATIENT)
Dept: MEDICAL GROUP | Facility: MEDICAL CENTER | Age: 31
End: 2021-03-08
Attending: PHYSICIAN ASSISTANT
Payer: MEDICAID

## 2021-03-08 VITALS
RESPIRATION RATE: 18 BRPM | HEIGHT: 62 IN | OXYGEN SATURATION: 95 % | TEMPERATURE: 97.5 F | SYSTOLIC BLOOD PRESSURE: 100 MMHG | BODY MASS INDEX: 22.87 KG/M2 | HEART RATE: 89 BPM | WEIGHT: 124.3 LBS | DIASTOLIC BLOOD PRESSURE: 60 MMHG

## 2021-03-08 DIAGNOSIS — F32.A ANXIETY AND DEPRESSION: ICD-10-CM

## 2021-03-08 DIAGNOSIS — R11.0 NAUSEA: ICD-10-CM

## 2021-03-08 DIAGNOSIS — G89.29 CHRONIC ABDOMINAL PAIN: ICD-10-CM

## 2021-03-08 DIAGNOSIS — F41.9 ANXIETY AND DEPRESSION: ICD-10-CM

## 2021-03-08 DIAGNOSIS — R10.9 CHRONIC ABDOMINAL PAIN: ICD-10-CM

## 2021-03-08 DIAGNOSIS — Z32.00 ENCOUNTER FOR PREGNANCY TEST, RESULT UNKNOWN: ICD-10-CM

## 2021-03-08 LAB
INT CON NEG: NEGATIVE
INT CON POS: POSITIVE
POC URINE PREGNANCY TEST: NORMAL

## 2021-03-08 PROCEDURE — 99214 OFFICE O/P EST MOD 30 MIN: CPT | Performed by: PHYSICIAN ASSISTANT

## 2021-03-08 PROCEDURE — 81025 URINE PREGNANCY TEST: CPT | Performed by: PHYSICIAN ASSISTANT

## 2021-03-08 PROCEDURE — 99213 OFFICE O/P EST LOW 20 MIN: CPT | Performed by: PHYSICIAN ASSISTANT

## 2021-03-08 RX ORDER — ONDANSETRON 4 MG/1
4 TABLET, FILM COATED ORAL EVERY 4 HOURS PRN
Qty: 30 TABLET | Refills: 5 | Status: SHIPPED | OUTPATIENT
Start: 2021-03-08 | End: 2021-04-07

## 2021-03-08 ASSESSMENT — ENCOUNTER SYMPTOMS
TINGLING: 0
FEVER: 0
CLAUDICATION: 0
HEARTBURN: 1
CONSTIPATION: 0
ABDOMINAL PAIN: 1
BLOOD IN STOOL: 0
DIARRHEA: 0
HEADACHES: 0
WEIGHT LOSS: 0
CHILLS: 0
WEAKNESS: 0
SHORTNESS OF BREATH: 0
PALPITATIONS: 0
DIZZINESS: 0
NAUSEA: 1
WHEEZING: 0
COUGH: 0
VOMITING: 0

## 2021-03-08 ASSESSMENT — FIBROSIS 4 INDEX: FIB4 SCORE: 0.85

## 2021-03-08 NOTE — ASSESSMENT & PLAN NOTE
Abby presents today for follow-up regarding anxiety and depression.  She has been on Zoloft 50 mg daily and alprazolam 0.5 mg as needed.  She has been tolerating both medications well without any side effects.  She states that since starting on this medication combination and this has reduced her anxiety and depression significantly.  She reports that she is no longer using alcohol as medication to alleviate her anxiety.  She is finding that she needs to use the alprazolam less and less as well.  She denies SI and HI.

## 2021-03-08 NOTE — PROGRESS NOTES
Chief Complaint   Patient presents with   • Follow-Up       Subjective:     HPI:   Abby Call is a 30 y.o. female here to discuss the evaluation and management of:    Anxiety and depression  Abby presents today for follow-up regarding anxiety and depression.  She has been on Zoloft 50 mg daily and alprazolam 0.5 mg as needed.  She has been tolerating both medications well without any side effects.  She states that since starting on this medication combination and this has reduced her anxiety and depression significantly.  She reports that she is no longer using alcohol as medication to alleviate her anxiety.  She is finding that she needs to use the alprazolam less and less as well.  She denies SI and HI.    Chronic abdominal pain  Abby presents today for follow-up.  She reports that her chronic abdominal pain has improved greatly over time.  However, just recently she has noticed when she eats eggs and certain carbs her symptoms are exacerbated in which she experiences nausea, heartburn and loss of appetite.  She reports that she has not followed up with GI due to her symptoms improving.  She is wondering if her hiatal hernia has anything to do with the symptoms she is experiencing. She would also like to perform a pregnancy test today to ensure that her symptoms are not secondary to this.    ROS  Review of Systems   Constitutional: Negative for chills, fever, malaise/fatigue and weight loss.   Respiratory: Negative for cough, shortness of breath and wheezing.    Cardiovascular: Negative for chest pain, palpitations, claudication and leg swelling.   Gastrointestinal: Positive for abdominal pain, heartburn and nausea. Negative for blood in stool, constipation, diarrhea, melena and vomiting.   Genitourinary: Negative for dysuria, frequency and urgency.   Neurological: Negative for dizziness, tingling, weakness and headaches.       No Known Allergies    Current medicines (including changes  today)  Current Outpatient Medications   Medication Sig Dispense Refill   • ondansetron (ZOFRAN) 4 MG Tab tablet Take 1 tablet by mouth every four hours as needed for Nausea/Vomiting for up to 30 days. 30 tablet 5   • polyethylene glycol 3350 (MIRALAX) 17 GM/SCOOP Powder Take 17 g by mouth every day. 225 g 3   • sertraline (ZOLOFT) 50 MG Tab Take 1 Tab by mouth every day. 30 Tab 11   • ALPRAZolam (XANAX) 0.5 MG Tab Take 1 Tab by mouth at bedtime as needed for up to 30 days. 30 Tab 0   • ibuprofen (MOTRIN) 800 MG Tab Take 800 mg by mouth every 8 hours as needed.     • TRULANCE 3 MG Tab Take 1 Tab by mouth every day.     • omeprazole (PRILOSEC) 20 MG delayed-release capsule Take 1 Cap by mouth every day. 30 Cap 0   • amoxicillin-clavulanate (AUGMENTIN) 875-125 MG Tab Take 1 Tab by mouth. FOR 7 DAYS     • SUMAtriptan (IMITREX) 50 MG Tab Take 1 Tab by mouth Once PRN for Migraine for up to 1 dose. 9 Tab 2   • dicyclomine (BENTYL) 20 MG Tab TAKE 1 TABLET BY MOUTH EVERY 6 HOURS 120 Tab 0   • ethinyl estradiol-etonogestrel (NUVARING) 0.12-0.015 MG/24HR vaginal ring INSERT 1 RING VAGINALLY AS DIRECTED. REMOVE AFTER 3 WEEKS & WAIT 7 DAYS BEFORE INSERTING A NEW RING 1 Each 6   • diclofenac DR (VOLTAREN) 75 MG Tablet Delayed Response Take 1 Tab by mouth 2 times a day. 30 Tab 0   • cyclobenzaprine (FLEXERIL) 10 MG Tab Take 1 Tab by mouth 3 times a day as needed. 30 Tab 0   • Blood Glucose Monitoring Suppl (ONE TOUCH ULTRA 2) w/Device Kit USE TO TEST BLOOD SUGAR IN THE MORNING BEFORE BREAKFAST OR DRINK ANYTHING     • Alcohol Swabs (ALCOHOL PADS) 70 % Pads      • ONETOUCH ULTRA strip      • Diclofenac Sodium (VOLTAREN) 1 % Gel Apply 1 Application to skin as directed as needed (Apply's on left wrist).     • fluticasone (FLONASE) 50 MCG/ACT nasal spray Spray 1 Spray in nose every evening.     • multivitamin (THERAGRAN) Tab Take 1 Tab by mouth every day.       No current facility-administered medications for this visit.       Social  "History     Tobacco Use   • Smoking status: Never Smoker   • Smokeless tobacco: Never Used   Substance Use Topics   • Alcohol use: Yes     Alcohol/week: 2.4 oz     Types: 4 Glasses of wine per week     Comment: on the weekends   • Drug use: No       Patient Active Problem List    Diagnosis Date Noted   • Chronic abdominal pain 08/11/2020   • Fatigue 07/15/2020   • Left wrist pain 04/24/2020   • Weight loss 04/24/2020   • Hip pain, left 05/08/2019   • Environmental allergies 05/08/2019   • Neutropenia (HCC) 10/03/2017   • Migraine without aura and without status migrainosus, not intractable 09/11/2017   • Anxiety and depression 09/11/2017   • Insomnia disorder 09/11/2017   • Encounter for birth control 09/11/2017       Family History   Problem Relation Age of Onset   • Cancer Father    • Cancer Maternal Aunt         Breast cancer   • Cancer Maternal Grandmother         Breast cancer   • Arthritis Maternal Grandmother    • Cancer Paternal Grandfather         Prostate CA        Objective:     /60 (BP Location: Left arm, Patient Position: Sitting, BP Cuff Size: Adult)   Pulse 89   Temp 36.4 °C (97.5 °F)   Resp 18   Ht 1.575 m (5' 2\")   Wt 56.4 kg (124 lb 4.8 oz)   SpO2 95%  Body mass index is 22.73 kg/m².    Physical Exam:  Physical Exam   Constitutional: She is oriented to person, place, and time and well-developed, well-nourished, and in no distress.   HENT:   Head: Normocephalic.   Right Ear: External ear normal.   Left Ear: External ear normal.   Cardiovascular: Normal rate, regular rhythm and normal heart sounds.   Pulmonary/Chest: Effort normal and breath sounds normal.   Abdominal: Soft. Bowel sounds are normal. There is abdominal tenderness in the epigastric area.   Neurological: She is alert and oriented to person, place, and time. Gait normal.   Skin: Skin is warm and dry.   Psychiatric: Affect and judgment normal.   Vitals reviewed.    Assessment and Plan:     The following treatment plan was " discussed:    1. Anxiety and depression  -This is a chronic improving condition.  - No SI or HI.  - Plan: Continue on Zoloft 50 mg daily and alprazolam as needed.  Follow-up as needed.    2. Chronic abdominal pain  - This is a chronic improving condition.  However, patient reports that she just recently started experiencing nausea and heartburn secondary to consuming certain foods.  - Plan: Recommend d/cing trigger foods and stay adequately hydrated.  If symptoms persist despite implementing this I recommend follow-up with GI in regards to hiatal hernia concerns.    3. Encounter for pregnancy test, result unknown  - POCT Pregnancy performed in clinic today was negative.    4. Nausea  - ondansetron (ZOFRAN) 4 MG Tab tablet; Take 1 tablet by mouth every four hours as needed for Nausea/Vomiting for up to 30 days.  Dispense: 30 tablet; Refill: 5       Any change or worsening of signs or symptoms, patient encouraged to follow-up or report to emergency room for further evaluation. Patient verbalizes understanding and agrees.    Follow-Up: Return if symptoms worsen or fail to improve.      PLEASE NOTE: This dictation was created using voice recognition software. I have made every reasonable attempt to correct obvious errors, but I expect that there are errors of grammar and possibly content that I did not discover before finalizing the note.

## 2021-03-08 NOTE — ASSESSMENT & PLAN NOTE
Abby presents today for follow-up.  She reports that her chronic abdominal pain has improved greatly over time.  However, just recently she has noticed when she eats eggs and certain carbs her symptoms are exacerbated in which she experiences nausea, heartburn and loss of appetite.  She reports that she has not followed up with GI due to her symptoms improving.  She is wondering if her hiatal hernia has anything to do with the symptoms she is experiencing.  She would also like to perform a pregnancy test today to ensure that her symptoms are not secondary to this.

## 2021-03-22 DIAGNOSIS — R10.2 PELVIC PAIN: ICD-10-CM

## 2021-03-22 DIAGNOSIS — N92.1 MENORRHAGIA WITH IRREGULAR CYCLE: ICD-10-CM

## 2021-03-31 ENCOUNTER — HOSPITAL ENCOUNTER (OUTPATIENT)
Dept: RADIOLOGY | Facility: MEDICAL CENTER | Age: 31
End: 2021-03-31
Attending: PHYSICIAN ASSISTANT
Payer: MEDICAID

## 2021-03-31 DIAGNOSIS — R10.2 PELVIC PAIN: ICD-10-CM

## 2021-03-31 DIAGNOSIS — N92.1 MENORRHAGIA WITH IRREGULAR CYCLE: ICD-10-CM

## 2021-03-31 PROCEDURE — 76830 TRANSVAGINAL US NON-OB: CPT

## 2021-04-10 DIAGNOSIS — Z30.015 ENCOUNTER FOR INITIAL PRESCRIPTION OF VAGINAL RING HORMONAL CONTRACEPTIVE: ICD-10-CM

## 2021-04-12 RX ORDER — ETONOGESTREL AND ETHINYL ESTRADIOL VAGINAL RING .015; .12 MG/D; MG/D
RING VAGINAL
Qty: 1 EACH | Refills: 3 | Status: SHIPPED | OUTPATIENT
Start: 2021-04-12 | End: 2021-08-03

## 2021-06-18 ENCOUNTER — HOSPITAL ENCOUNTER (EMERGENCY)
Facility: MEDICAL CENTER | Age: 31
End: 2021-06-18
Attending: EMERGENCY MEDICINE
Payer: MEDICAID

## 2021-06-18 ENCOUNTER — APPOINTMENT (OUTPATIENT)
Dept: RADIOLOGY | Facility: MEDICAL CENTER | Age: 31
End: 2021-06-18
Attending: EMERGENCY MEDICINE
Payer: MEDICAID

## 2021-06-18 VITALS
DIASTOLIC BLOOD PRESSURE: 68 MMHG | SYSTOLIC BLOOD PRESSURE: 100 MMHG | RESPIRATION RATE: 18 BRPM | TEMPERATURE: 98.4 F | OXYGEN SATURATION: 96 % | BODY MASS INDEX: 22.08 KG/M2 | HEART RATE: 64 BPM | WEIGHT: 120 LBS | HEIGHT: 62 IN

## 2021-06-18 DIAGNOSIS — R11.0 NAUSEA: ICD-10-CM

## 2021-06-18 DIAGNOSIS — R10.32 LLQ PAIN: ICD-10-CM

## 2021-06-18 DIAGNOSIS — R10.9 ACUTE ABDOMINAL PAIN: ICD-10-CM

## 2021-06-18 LAB
ALBUMIN SERPL BCP-MCNC: 3.7 G/DL (ref 3.2–4.9)
ALBUMIN/GLOB SERPL: 1.3 G/DL
ALP SERPL-CCNC: 37 U/L (ref 30–99)
ALT SERPL-CCNC: 13 U/L (ref 2–50)
ANION GAP SERPL CALC-SCNC: 9 MMOL/L (ref 7–16)
APPEARANCE UR: CLEAR
AST SERPL-CCNC: 15 U/L (ref 12–45)
BASOPHILS # BLD AUTO: 0.3 % (ref 0–1.8)
BASOPHILS # BLD: 0.02 K/UL (ref 0–0.12)
BILIRUB SERPL-MCNC: 0.3 MG/DL (ref 0.1–1.5)
BILIRUB UR QL STRIP.AUTO: NEGATIVE
BUN SERPL-MCNC: 10 MG/DL (ref 8–22)
CALCIUM SERPL-MCNC: 8.8 MG/DL (ref 8.5–10.5)
CHLORIDE SERPL-SCNC: 110 MMOL/L (ref 96–112)
CO2 SERPL-SCNC: 23 MMOL/L (ref 20–33)
COLOR UR: YELLOW
CREAT SERPL-MCNC: 0.77 MG/DL (ref 0.5–1.4)
EOSINOPHIL # BLD AUTO: 0.06 K/UL (ref 0–0.51)
EOSINOPHIL NFR BLD: 1 % (ref 0–6.9)
ERYTHROCYTE [DISTWIDTH] IN BLOOD BY AUTOMATED COUNT: 40.8 FL (ref 35.9–50)
GLOBULIN SER CALC-MCNC: 2.8 G/DL (ref 1.9–3.5)
GLUCOSE SERPL-MCNC: 100 MG/DL (ref 65–99)
GLUCOSE UR STRIP.AUTO-MCNC: NEGATIVE MG/DL
HCG SERPL QL: NEGATIVE
HCT VFR BLD AUTO: 39.3 % (ref 37–47)
HGB BLD-MCNC: 13 G/DL (ref 12–16)
IMM GRANULOCYTES # BLD AUTO: 0.02 K/UL (ref 0–0.11)
IMM GRANULOCYTES NFR BLD AUTO: 0.3 % (ref 0–0.9)
KETONES UR STRIP.AUTO-MCNC: NEGATIVE MG/DL
LEUKOCYTE ESTERASE UR QL STRIP.AUTO: NEGATIVE
LIPASE SERPL-CCNC: 31 U/L (ref 11–82)
LYMPHOCYTES # BLD AUTO: 3.25 K/UL (ref 1–4.8)
LYMPHOCYTES NFR BLD: 56.2 % (ref 22–41)
MCH RBC QN AUTO: 30.2 PG (ref 27–33)
MCHC RBC AUTO-ENTMCNC: 33.1 G/DL (ref 33.6–35)
MCV RBC AUTO: 91.2 FL (ref 81.4–97.8)
MICRO URNS: NORMAL
MONOCYTES # BLD AUTO: 0.45 K/UL (ref 0–0.85)
MONOCYTES NFR BLD AUTO: 7.8 % (ref 0–13.4)
NEUTROPHILS # BLD AUTO: 1.98 K/UL (ref 2–7.15)
NEUTROPHILS NFR BLD: 34.4 % (ref 44–72)
NITRITE UR QL STRIP.AUTO: NEGATIVE
NRBC # BLD AUTO: 0 K/UL
NRBC BLD-RTO: 0 /100 WBC
PH UR STRIP.AUTO: 7 [PH] (ref 5–8)
PLATELET # BLD AUTO: 219 K/UL (ref 164–446)
PMV BLD AUTO: 9.5 FL (ref 9–12.9)
POTASSIUM SERPL-SCNC: 3.9 MMOL/L (ref 3.6–5.5)
PROT SERPL-MCNC: 6.5 G/DL (ref 6–8.2)
PROT UR QL STRIP: NEGATIVE MG/DL
RBC # BLD AUTO: 4.31 M/UL (ref 4.2–5.4)
RBC UR QL AUTO: NEGATIVE
SODIUM SERPL-SCNC: 142 MMOL/L (ref 135–145)
SP GR UR STRIP.AUTO: 1.02
UROBILINOGEN UR STRIP.AUTO-MCNC: 0.2 MG/DL
WBC # BLD AUTO: 5.8 K/UL (ref 4.8–10.8)

## 2021-06-18 PROCEDURE — 700102 HCHG RX REV CODE 250 W/ 637 OVERRIDE(OP): Performed by: EMERGENCY MEDICINE

## 2021-06-18 PROCEDURE — 84703 CHORIONIC GONADOTROPIN ASSAY: CPT

## 2021-06-18 PROCEDURE — 85025 COMPLETE CBC W/AUTO DIFF WBC: CPT

## 2021-06-18 PROCEDURE — 81003 URINALYSIS AUTO W/O SCOPE: CPT

## 2021-06-18 PROCEDURE — A9270 NON-COVERED ITEM OR SERVICE: HCPCS | Performed by: EMERGENCY MEDICINE

## 2021-06-18 PROCEDURE — 96374 THER/PROPH/DIAG INJ IV PUSH: CPT

## 2021-06-18 PROCEDURE — 700105 HCHG RX REV CODE 258: Performed by: EMERGENCY MEDICINE

## 2021-06-18 PROCEDURE — 96375 TX/PRO/DX INJ NEW DRUG ADDON: CPT

## 2021-06-18 PROCEDURE — 80053 COMPREHEN METABOLIC PANEL: CPT

## 2021-06-18 PROCEDURE — 83690 ASSAY OF LIPASE: CPT

## 2021-06-18 PROCEDURE — 76856 US EXAM PELVIC COMPLETE: CPT

## 2021-06-18 PROCEDURE — 700111 HCHG RX REV CODE 636 W/ 250 OVERRIDE (IP): Performed by: EMERGENCY MEDICINE

## 2021-06-18 PROCEDURE — 99285 EMERGENCY DEPT VISIT HI MDM: CPT

## 2021-06-18 RX ORDER — DICYCLOMINE HYDROCHLORIDE 10 MG/1
10 CAPSULE ORAL
Qty: 28 CAPSULE | Refills: 0 | Status: SHIPPED | OUTPATIENT
Start: 2021-06-18 | End: 2021-06-25

## 2021-06-18 RX ORDER — SODIUM CHLORIDE 9 MG/ML
1000 INJECTION, SOLUTION INTRAVENOUS ONCE
Status: COMPLETED | OUTPATIENT
Start: 2021-06-18 | End: 2021-06-18

## 2021-06-18 RX ORDER — ACETAMINOPHEN 325 MG/1
650 TABLET ORAL ONCE
Status: COMPLETED | OUTPATIENT
Start: 2021-06-18 | End: 2021-06-18

## 2021-06-18 RX ORDER — ONDANSETRON 2 MG/ML
4 INJECTION INTRAMUSCULAR; INTRAVENOUS ONCE
Status: COMPLETED | OUTPATIENT
Start: 2021-06-18 | End: 2021-06-18

## 2021-06-18 RX ORDER — KETOROLAC TROMETHAMINE 30 MG/ML
15 INJECTION, SOLUTION INTRAMUSCULAR; INTRAVENOUS ONCE
Status: COMPLETED | OUTPATIENT
Start: 2021-06-18 | End: 2021-06-18

## 2021-06-18 RX ADMIN — ACETAMINOPHEN 650 MG: 325 TABLET, FILM COATED ORAL at 06:33

## 2021-06-18 RX ADMIN — ONDANSETRON 4 MG: 2 INJECTION INTRAMUSCULAR; INTRAVENOUS at 06:35

## 2021-06-18 RX ADMIN — SODIUM CHLORIDE 1000 ML: 9 INJECTION, SOLUTION INTRAVENOUS at 06:40

## 2021-06-18 RX ADMIN — KETOROLAC TROMETHAMINE 15 MG: 30 INJECTION, SOLUTION INTRAMUSCULAR; INTRAVENOUS at 07:52

## 2021-06-18 RX ADMIN — FAMOTIDINE 20 MG: 10 INJECTION INTRAVENOUS at 06:35

## 2021-06-18 RX ADMIN — LIDOCAINE HYDROCHLORIDE 30 ML: 20 SOLUTION OROPHARYNGEAL at 06:33

## 2021-06-18 ASSESSMENT — FIBROSIS 4 INDEX: FIB4 SCORE: 0.85

## 2021-06-18 ASSESSMENT — PAIN DESCRIPTION - DESCRIPTORS: DESCRIPTORS: BURNING

## 2021-06-18 ASSESSMENT — PAIN DESCRIPTION - PAIN TYPE: TYPE: ACUTE PAIN

## 2021-06-18 NOTE — DISCHARGE INSTRUCTIONS
Your basic labs were very reassuring, your ultrasound results were also very reassuring, please take the dental for your pain, if it worsens please return to the emergency department.

## 2021-06-18 NOTE — ED PROVIDER NOTES
ED Provider Note    Patient waiting patient signed out to me, awaiting ultrasound. On repeat exam patient has no tenderness. Return precautions discussed.

## 2021-06-18 NOTE — ED PROVIDER NOTES
ED Provider Note    CHIEF COMPLAINT  Chief Complaint   Patient presents with   • Abdominal Pain     pt woke up and felt nauseaus, burning sensation on chest and dizzy        HPI  Patient is a 30-year-old female presents emergency room for several acute complaints.  The patient states that she woke up tonight and was feeling somewhat nauseous with burning sensations in her abdomen, radiating up towards her chest.  She notes that this evolved to having a somewhat localized pain in the left lower quadrant lower portion of the abdomen.  She has not had a recent dysuria, no vaginal pain or discharge but does note over the last 3 to 4 days she has had some loose stools and abdominal cramping.  No recent illness in the family.  Family members were concerned as she appears somewhat anxious and has a sick family member which she is caring for.  She states that she is concerned about this, is not necessarily feeling anxious but is more concerned about the pain and discomfort she is having.  She denies any shortness of breath.    She endorses a sensation of lightheadedness that has since subsided, nonspecific frontal headache that has been waxing and waning since the stool started.    PPE Note: I personally donned full PPE for all patient encounters during this visit, including being clean-shaven with an N95 respirator mask, gloves, and goggles.       REVIEW OF SYSTEMS  See HPI for further details. All other systems are negative.     PAST MEDICAL HISTORY   has a past medical history of Anxiety and depression and Migraine.    SOCIAL HISTORY  Social History     Tobacco Use   • Smoking status: Never Smoker   • Smokeless tobacco: Never Used   Vaping Use   • Vaping Use: Never used   Substance and Sexual Activity   • Alcohol use: Yes     Alcohol/week: 2.4 oz     Types: 4 Glasses of wine per week     Comment: on the weekends   • Drug use: No   • Sexual activity: Not Currently     Partners: Male     SURGICAL HISTORY   has a past  "surgical history that includes primary c section (01/31/2019).    CURRENT MEDICATIONS  Home Medications    **Home medications have not yet been reviewed for this encounter**       ALLERGIES  No Known Allergies    PHYSICAL EXAM  VITAL SIGNS: /75   Pulse 71   Temp 36.9 °C (98.5 °F) (Oral)   Resp 16   Ht 1.575 m (5' 2\")   Wt 54.4 kg (120 lb)   LMP 06/11/2021 (Approximate)   SpO2 96%   BMI 21.95 kg/m²   Pulse ox interpretation: I interpret this pulse ox as normal.  Genl: F sitting in gurney uncomfortably, speaking clearly, appears anxious but in no acute distress  Head: NC/AT   ENT: Mucous membranes dry, posterior pharynx clear, uvula midline, nares patent bilaterally  Eyes: Normal sclera, pupils equal round reactive to light  Neck: Supple, FROM, no LAD appreciated   Pulmonary: Lungs are clear to auscultation bilaterally  Chest: No TTP  CV:  RRR, no murmur appreciated, pulses 2+ in both upper and lower extremities,  Abdomen: soft, tenderness in the epigastrium, left flank, left lower quadrant.  No McBurney's point tenderness, no Francis sign.  ND; no rebound/guarding, no masses palpated, no HSM   : no CVA or suprapubic tenderness   Musculoskeletal: Pain free ROM of the neck. Moving upper and lower extremities and spontaneous in coordinated fashion  Neuro: A&Ox4 (person, place, time, situation), speech fluent, gait steady, no focal deficits appreciated, No cerebellar signs. Sensation is grossly intact in the distal upper and lower extremities.  5/5 strength in  and dorsiflexion/plantar flexion of the ankles  Psych: Patient has an appropriate affect and behavior  Skin: No rash or lesions.  No pallor or jaundice.  No cyanosis.  Warm and dry.     DIAGNOSTIC STUDIES / PROCEDURES    LABS  Labs Reviewed   CBC WITH DIFFERENTIAL - Abnormal; Notable for the following components:       Result Value    MCHC 33.1 (*)     Neutrophils-Polys 34.40 (*)     Lymphocytes 56.20 (*)     Neutrophils (Absolute) 1.98 (*)     " All other components within normal limits   COMP METABOLIC PANEL - Abnormal; Notable for the following components:    Glucose 100 (*)     All other components within normal limits   LIPASE   HCG QUAL SERUM   ESTIMATED GFR   URINALYSIS     RADIOLOGY  US-PELVIC COMPLETE (TRANSABDOMINAL/TRANSVAGINAL) (COMBO)    (Results Pending)     COURSE & MEDICAL DECISION MAKING  Pertinent Labs & Imaging studies reviewed. (See chart for details)    DDX:  Ectopic pregnancy/pregnancy, ovarian cyst, viral syndrome, Neprolithiasis, UTI/Cystitis, Gastroenteritis, pancreatitis, gastritis, GERD    MDM    Initial evaluation at 0545:  Patient presents emergency room for symptoms as described above.  The patient had recent symptoms suggestive of underlying gastroenteritis and had abdominal cramping discomfort with now development of burning sensations and lightheadedness that could be due to underlying dehydration.  Patient's blood pressures are slightly soft, she does not have any concerning tachycardia and while she has some clinical signs of dehydration I think she would benefit from work-up for the broad differential as noted above, fluid resuscitation for clinical signs of dehydration.  She does not have a peritoneal abdomen, she does have some localizing tenderness in the upper portions, left side is nonspecific nonlocalizing.  Repeat assessments do not show progression to peritonitis or gross distention.  She is treated empirically with a GI cocktail for possible peptic ulcer disease or underlying ulcerative disease while awaiting the results of the medical work-up.    Initial lab work shows no leukocytosis, no anemia, no evidence of acute LFT elevations or lipase elevation.  This does not appear to be an acute hepatobiliary obstruction nor is it pancreatitis.  hCG is negative, there is no acute kidney injury.    On reevaluation the patient was noting that her pain is only in the left lower quadrant and she does remember that she has  had an ovarian cyst in the past.  Endovaginal ultrasound is ordered to rule out torsion versus large ovarian cyst.    Patient is still having mild amounts of pain and Toradol is administered with moderate effect.  At the time of signout she is currently pending ultrasound and urinalysis.  Oncoming provider will follow up with these results and please see follow-up note for final disposition    HYDRATION: Based on the patient's presentation of Dehydration the patient was given IV fluids. IV Hydration was used because oral hydration was not adequate alone. Upon recheck following hydration, the patient was improved.     FINAL IMPRESSION  Visit Diagnoses     ICD-10-CM   1. LLQ pain  R10.32   2. Nausea  R11.0     Electronically signed by: Mckay Pearce M.D., 6/18/2021 5:35 AM

## 2021-06-18 NOTE — ED NOTES
Pts mother states her son is having brain surgery on Monday and this might be causing her daughter to have an anxiety attack

## 2021-06-18 NOTE — ED TRIAGE NOTES
Pt states she woke up to  her son and started to have lower abdominal pain, lower abdomen tender to palpation, pt states she felt nauseous, and did not feel right

## 2021-06-18 NOTE — ED NOTES
Received report from JEFF Merrill. Patient is AOx4, lethargic, and complaining of a headache and left lower quadrant abdominal pain 7/10. Patient's VSS and the patient vomited the GI cocktail she was previously given.

## 2021-06-18 NOTE — ED NOTES
Discharge teaching and paperwork provided regarding abdominal pain and all questions/concerns answered. VSS, abdominal assessment stable and PIV removed. Given information regarding Bentyl Rx. Patient discharged to the care of herself and spouse and ambulated out of the ED.

## 2021-06-21 DIAGNOSIS — J32.9 CHRONIC SINUSITIS, UNSPECIFIED LOCATION: ICD-10-CM

## 2021-07-19 DIAGNOSIS — R10.9 CHRONIC ABDOMINAL PAIN: ICD-10-CM

## 2021-07-19 DIAGNOSIS — G89.29 CHRONIC ABDOMINAL PAIN: ICD-10-CM

## 2021-11-09 ENCOUNTER — OFFICE VISIT (OUTPATIENT)
Dept: MEDICAL GROUP | Facility: MEDICAL CENTER | Age: 31
End: 2021-11-09
Attending: PHYSICIAN ASSISTANT
Payer: MEDICAID

## 2021-11-09 VITALS
HEIGHT: 62 IN | SYSTOLIC BLOOD PRESSURE: 92 MMHG | DIASTOLIC BLOOD PRESSURE: 70 MMHG | HEART RATE: 78 BPM | TEMPERATURE: 97.3 F | OXYGEN SATURATION: 94 % | BODY MASS INDEX: 24.01 KG/M2 | WEIGHT: 130.5 LBS | RESPIRATION RATE: 18 BRPM

## 2021-11-09 DIAGNOSIS — R23.8 BENIGN PAPULE: ICD-10-CM

## 2021-11-09 PROCEDURE — 99212 OFFICE O/P EST SF 10 MIN: CPT | Performed by: PHYSICIAN ASSISTANT

## 2021-11-09 PROCEDURE — 17110 DESTRUCTION B9 LES UP TO 14: CPT | Performed by: PHYSICIAN ASSISTANT

## 2021-11-09 PROCEDURE — 99213 OFFICE O/P EST LOW 20 MIN: CPT | Performed by: PHYSICIAN ASSISTANT

## 2021-11-09 ASSESSMENT — FIBROSIS 4 INDEX: FIB4 SCORE: 0.59

## 2021-11-10 NOTE — ASSESSMENT & PLAN NOTE
Abby presents today for follow-up.  She reports that she has noticed a bump on her scalp that is occasionally painful and causes discomfort.  She reports that it tends to get irritated when combing or washing her hair.  She would like this to be evaluated today.

## 2021-11-10 NOTE — PROGRESS NOTES
Chief Complaint   Patient presents with   • Follow-Up     Subjective:     HPI:   Abby Call is a 30 y.o. female here to discuss the evaluation and management of:    Benign papule  Abby presents today for follow-up.  She reports that she has noticed a bump on her scalp that is occasionally painful and causes discomfort.  She reports that it tends to get irritated when combing or washing her hair.  She would like this to be evaluated today.    ROS  See HPI.     No Known Allergies    Current medicines (including changes today)  Current Outpatient Medications   Medication Sig Dispense Refill   • ethinyl estradiol-etonogestrel (NUVARING) 0.12-0.015 MG/24HR vaginal ring INSERT 1 RING VAGINALLY AS DIRECTED, REMOVE AFTER 3 WEEKS AND WAIT 7 DAYS BEFORE INSERTING A NEW RING 3 Each 1   • polyethylene glycol 3350 (MIRALAX) 17 GM/SCOOP Powder Take 17 g by mouth every day. 225 g 3   • sertraline (ZOLOFT) 50 MG Tab Take 1 Tab by mouth every day. 30 Tab 11   • ibuprofen (MOTRIN) 800 MG Tab Take 800 mg by mouth every 8 hours as needed.     • TRULANCE 3 MG Tab Take 1 Tab by mouth every day.     • omeprazole (PRILOSEC) 20 MG delayed-release capsule Take 1 Cap by mouth every day. 30 Cap 0   • amoxicillin-clavulanate (AUGMENTIN) 875-125 MG Tab Take 1 Tab by mouth. FOR 7 DAYS     • SUMAtriptan (IMITREX) 50 MG Tab Take 1 Tab by mouth Once PRN for Migraine for up to 1 dose. 9 Tab 2   • diclofenac DR (VOLTAREN) 75 MG Tablet Delayed Response Take 1 Tab by mouth 2 times a day. 30 Tab 0   • cyclobenzaprine (FLEXERIL) 10 MG Tab Take 1 Tab by mouth 3 times a day as needed. 30 Tab 0   • Blood Glucose Monitoring Suppl (ONE TOUCH ULTRA 2) w/Device Kit USE TO TEST BLOOD SUGAR IN THE MORNING BEFORE BREAKFAST OR DRINK ANYTHING     • Alcohol Swabs (ALCOHOL PADS) 70 % Pads      • ONETOUCH ULTRA strip      • Diclofenac Sodium (VOLTAREN) 1 % Gel Apply 1 Application to skin as directed as needed (Apply's on left wrist).     • fluticasone  "(FLONASE) 50 MCG/ACT nasal spray Spray 1 Spray in nose every evening.     • multivitamin (THERAGRAN) Tab Take 1 Tab by mouth every day.       No current facility-administered medications for this visit.       Social History     Tobacco Use   • Smoking status: Never Smoker   • Smokeless tobacco: Never Used   Vaping Use   • Vaping Use: Never used   Substance Use Topics   • Alcohol use: Yes     Alcohol/week: 2.4 oz     Types: 4 Glasses of wine per week     Comment: on the weekends   • Drug use: No       Patient Active Problem List    Diagnosis Date Noted   • Benign papule 11/09/2021   • Chronic abdominal pain 08/11/2020   • Fatigue 07/15/2020   • Left wrist pain 04/24/2020   • Weight loss 04/24/2020   • Hip pain, left 05/08/2019   • Environmental allergies 05/08/2019   • Neutropenia (HCC) 10/03/2017   • Migraine without aura and without status migrainosus, not intractable 09/11/2017   • Anxiety and depression 09/11/2017   • Insomnia disorder 09/11/2017   • Encounter for birth control 09/11/2017       Family History   Problem Relation Age of Onset   • Cancer Father    • Cancer Maternal Aunt         Breast cancer   • Cancer Maternal Grandmother         Breast cancer   • Arthritis Maternal Grandmother    • Cancer Paternal Grandfather         Prostate CA        Objective:     BP (!) 92/70 (BP Location: Left arm, Patient Position: Sitting, BP Cuff Size: Adult)   Pulse 78   Temp 36.3 °C (97.3 °F) (Skin)   Resp 18   Ht 1.575 m (5' 2\")   Wt 59.2 kg (130 lb 8 oz)   SpO2 94%  Body mass index is 23.87 kg/m².    Physical Exam:  Physical Exam  Vitals reviewed.   Constitutional:       Appearance: Normal appearance.   HENT:      Head: Normocephalic.      Right Ear: External ear normal.      Left Ear: External ear normal.   Cardiovascular:      Rate and Rhythm: Normal rate and regular rhythm.      Heart sounds: Normal heart sounds.   Pulmonary:      Effort: Pulmonary effort is normal.      Breath sounds: Normal breath sounds. "   Skin:     General: Skin is warm and dry.      Comments: Small, benign fleshy papule of the scalp.  Mild pain on palpation.   Neurological:      Mental Status: She is alert.       Assessment and Plan:     The following treatment plan was discussed:    1. Benign papule  - This is a newer condition.  - Plan: Cryotherapy performed on fleshy papule in clinic today. See below for procedure note.  - Cryotherapy:  Discussed the benign nature of this papule.  After discussing risks and benefits, verbal consent was obtained and cryotherapy performed using liquid nitrogen, freeze-thaw-freeze technique x 1.  Patient tolerated the procedure well.  Aftercare as well as potential for blistering was discussed.  I explained that the procedure may need to be repeated if there is not complete resolution.     Any change or worsening of signs or symptoms, patient encouraged to follow-up or report to emergency room for further evaluation. Patient verbalizes understanding and agrees.    Follow-Up: Return if symptoms worsen or fail to improve.      PLEASE NOTE: This dictation was created using voice recognition software. I have made every reasonable attempt to correct obvious errors, but I expect that there are errors of grammar and possibly content that I did not discover before finalizing the note.

## 2021-11-11 ENCOUNTER — HOSPITAL ENCOUNTER (OUTPATIENT)
Dept: RADIOLOGY | Facility: MEDICAL CENTER | Age: 31
End: 2021-11-11
Attending: NURSE PRACTITIONER
Payer: MEDICAID

## 2021-11-11 DIAGNOSIS — R11.0 NAUSEA: ICD-10-CM

## 2021-11-11 DIAGNOSIS — R68.81 EARLY SATIETY: ICD-10-CM

## 2021-11-11 DIAGNOSIS — R10.13 EPIGASTRIC PAIN: ICD-10-CM

## 2021-11-11 PROCEDURE — 76700 US EXAM ABDOM COMPLETE: CPT

## 2021-11-12 ENCOUNTER — HOSPITAL ENCOUNTER (OUTPATIENT)
Dept: RADIOLOGY | Facility: MEDICAL CENTER | Age: 31
End: 2021-11-12
Attending: NURSE PRACTITIONER
Payer: MEDICAID

## 2021-11-12 DIAGNOSIS — R10.13 EPIGASTRIC PAIN: ICD-10-CM

## 2021-11-12 DIAGNOSIS — R10.13 ABDOMINAL PAIN, EPIGASTRIC: ICD-10-CM

## 2021-11-12 DIAGNOSIS — R11.0 NAUSEA: ICD-10-CM

## 2021-11-12 PROCEDURE — A9541 TC99M SULFUR COLLOID: HCPCS

## 2021-12-13 DIAGNOSIS — M25.752 OSTEOPHYTE OF LEFT HIP: ICD-10-CM

## 2021-12-13 DIAGNOSIS — M25.552 LEFT HIP PAIN: ICD-10-CM

## 2021-12-16 ENCOUNTER — PATIENT MESSAGE (OUTPATIENT)
Dept: MEDICAL GROUP | Facility: MEDICAL CENTER | Age: 31
End: 2021-12-16

## 2021-12-16 DIAGNOSIS — Z91.09 ENVIRONMENTAL ALLERGIES: ICD-10-CM

## 2021-12-16 RX ORDER — FLUTICASONE PROPIONATE 50 MCG
SPRAY, SUSPENSION (ML) NASAL
Qty: 48 G | Refills: 3 | Status: SHIPPED | OUTPATIENT
Start: 2021-12-16 | End: 2022-03-07

## 2021-12-16 RX ORDER — FLUTICASONE PROPIONATE 50 MCG
1 SPRAY, SUSPENSION (ML) NASAL EVERY EVENING
Qty: 16 G | Refills: 5 | Status: SHIPPED | OUTPATIENT
Start: 2021-12-16 | End: 2021-12-16

## 2021-12-16 NOTE — PATIENT COMMUNICATION
Received request via: Pharmacy    Was the patient seen in the last year in this department? Yes    Does the patient have an active prescription (recently filled or refills available) for medication(s) requested? No     See mychart message

## 2021-12-16 NOTE — TELEPHONE ENCOUNTER
From: Abby Call  To: Physician Assistant Mine Monsalve  Sent: 12/16/2021 10:13 AM PST  Subject: Prescription     Hi, is it possible to get a refill or new prescription on the Flonase? It’s not available for me to request a refill    Pharmacy is [facility]

## 2021-12-17 NOTE — TELEPHONE ENCOUNTER
Received request via: Pharmacy    Was the patient seen in the last year in this department? Yes    Does the patient have an active prescription (recently filled or refills available) for medication(s) requested? Yes, pt is requesting a 90 day supply

## 2022-01-03 DIAGNOSIS — F41.9 ANXIETY AND DEPRESSION: ICD-10-CM

## 2022-01-03 DIAGNOSIS — F32.A ANXIETY AND DEPRESSION: ICD-10-CM

## 2022-01-03 RX ORDER — ALPRAZOLAM 0.5 MG/1
0.5 TABLET ORAL
Qty: 30 TABLET | Refills: 0 | Status: SHIPPED | OUTPATIENT
Start: 2022-01-03 | End: 2022-02-02

## 2022-01-17 DIAGNOSIS — Z30.015 ENCOUNTER FOR INITIAL PRESCRIPTION OF VAGINAL RING HORMONAL CONTRACEPTIVE: ICD-10-CM

## 2022-01-17 RX ORDER — ETONOGESTREL AND ETHINYL ESTRADIOL VAGINAL RING .015; .12 MG/D; MG/D
RING VAGINAL
Qty: 3 EACH | Refills: 1 | Status: SHIPPED | OUTPATIENT
Start: 2022-01-17 | End: 2022-06-30 | Stop reason: SDUPTHER

## 2022-02-16 ENCOUNTER — OFFICE VISIT (OUTPATIENT)
Dept: URGENT CARE | Facility: PHYSICIAN GROUP | Age: 32
End: 2022-02-16
Payer: COMMERCIAL

## 2022-02-16 VITALS
OXYGEN SATURATION: 98 % | DIASTOLIC BLOOD PRESSURE: 80 MMHG | SYSTOLIC BLOOD PRESSURE: 112 MMHG | RESPIRATION RATE: 15 BRPM | BODY MASS INDEX: 24.66 KG/M2 | TEMPERATURE: 98.1 F | HEART RATE: 82 BPM | WEIGHT: 134 LBS | HEIGHT: 62 IN

## 2022-02-16 DIAGNOSIS — G43.009 MIGRAINE WITHOUT AURA AND WITHOUT STATUS MIGRAINOSUS, NOT INTRACTABLE: ICD-10-CM

## 2022-02-16 PROCEDURE — 99214 OFFICE O/P EST MOD 30 MIN: CPT | Performed by: NURSE PRACTITIONER

## 2022-02-16 ASSESSMENT — FIBROSIS 4 INDEX: FIB4 SCORE: 0.61

## 2022-02-17 NOTE — PROGRESS NOTES
Abby Call is a 31 y.o. female who presents for Headache (Not focused, stabbing burning pain x3 days)      HPI This is a new problem.   = c/o headache that is a dull and burning. She has had a few episode of stabbing pain sensation in her head. + photophobia. Treatment tried:   Advil. Has not helped. Has not taken her sumitripan yet because this headache is slightly different from her usual or typical migraines. No other aggravating or alleviating factors.   Denies recent illness, trauma or unusual activity       Review of Systems   Constitutional: Negative for chills, fever and malaise/fatigue.   Respiratory: Negative for cough.    Musculoskeletal: Negative for falls and myalgias.   Neurological: Positive for headaches. Negative for dizziness, sensory change, focal weakness, seizures and weakness.   Endo/Heme/Allergies: Negative for environmental allergies.   Psychiatric/Behavioral: Negative for substance abuse.       Allergies:     No Known Allergies    PMSFS Hx:  Past Medical History:   Diagnosis Date   • Anxiety and depression    • Migraine      Past Surgical History:   Procedure Laterality Date   • PRIMARY C SECTION  01/31/2019     Family History   Problem Relation Age of Onset   • Cancer Father    • Cancer Maternal Aunt         Breast cancer   • Cancer Maternal Grandmother         Breast cancer   • Arthritis Maternal Grandmother    • Cancer Paternal Grandfather         Prostate CA     Social History     Tobacco Use   • Smoking status: Never Smoker   • Smokeless tobacco: Never Used   Substance Use Topics   • Alcohol use: Yes     Alcohol/week: 2.4 oz     Types: 4 Glasses of wine per week     Comment: on the weekends       Problems:   Patient Active Problem List   Diagnosis   • Migraine without aura and without status migrainosus, not intractable   • Anxiety and depression   • Insomnia disorder   • Encounter for birth control   • Neutropenia (HCC)   • Hip pain, left   • Environmental allergies   • Left  "wrist pain   • Weight loss   • Fatigue   • Chronic abdominal pain   • Benign papule       Medications:   Current Outpatient Medications on File Prior to Visit   Medication Sig Dispense Refill   • ethinyl estradiol-etonogestrel (NUVARING) 0.12-0.015 MG/24HR vaginal ring INSERT 1 RING VAGINALLY AS DIRECTED, REMOVE AFTER 3 WEEKS AND WAIT 7 DAYS BEFORE INSERTING A NEW RING 3 Each 1   • fluticasone (FLONASE) 50 MCG/ACT nasal spray INHALE ONE SPRAY IN EACH NOSTRIL DAILY EVERY EVENING 48 g 3   • polyethylene glycol 3350 (MIRALAX) 17 GM/SCOOP Powder Take 17 g by mouth every day. 225 g 3   • sertraline (ZOLOFT) 50 MG Tab Take 1 Tab by mouth every day. 30 Tab 11   • ibuprofen (MOTRIN) 800 MG Tab Take 800 mg by mouth every 8 hours as needed.     • TRULANCE 3 MG Tab Take 1 Tab by mouth every day.     • omeprazole (PRILOSEC) 20 MG delayed-release capsule Take 1 Cap by mouth every day. 30 Cap 0   • SUMAtriptan (IMITREX) 50 MG Tab Take 1 Tab by mouth Once PRN for Migraine for up to 1 dose. 9 Tab 2   • diclofenac DR (VOLTAREN) 75 MG Tablet Delayed Response Take 1 Tab by mouth 2 times a day. 30 Tab 0   • cyclobenzaprine (FLEXERIL) 10 MG Tab Take 1 Tab by mouth 3 times a day as needed. 30 Tab 0   • Blood Glucose Monitoring Suppl (ONE TOUCH ULTRA 2) w/Device Kit USE TO TEST BLOOD SUGAR IN THE MORNING BEFORE BREAKFAST OR DRINK ANYTHING     • Alcohol Swabs (ALCOHOL PADS) 70 % Pads      • ONETOUCH ULTRA strip      • Diclofenac Sodium 1 % Gel Apply 1 Application to skin as directed as needed (Apply's on left wrist).     • multivitamin (THERAGRAN) Tab Take 1 Tab by mouth every day.     • amoxicillin-clavulanate (AUGMENTIN) 875-125 MG Tab Take 1 Tab by mouth. FOR 7 DAYS       No current facility-administered medications on file prior to visit.          Objective:     /80 (BP Location: Left arm, Patient Position: Sitting, BP Cuff Size: Adult)   Pulse 82   Temp 36.7 °C (98.1 °F) (Temporal)   Resp 15   Ht 1.575 m (5' 2\")   Wt 60.8 " kg (134 lb)   SpO2 98%   BMI 24.51 kg/m²     Physical Exam  Vitals and nursing note reviewed.   Constitutional:       General: She is not in acute distress.     Appearance: Normal appearance. She is normal weight. She is not ill-appearing or toxic-appearing.   HENT:      Head: Normocephalic.      Right Ear: Tympanic membrane, ear canal and external ear normal.      Left Ear: Tympanic membrane, ear canal and external ear normal.      Nose: Nose normal. No congestion.      Mouth/Throat:      Lips: Pink.      Mouth: Mucous membranes are moist.   Eyes:      Extraocular Movements: Extraocular movements intact.      Conjunctiva/sclera: Conjunctivae normal.      Pupils: Pupils are equal, round, and reactive to light.   Cardiovascular:      Rate and Rhythm: Normal rate and regular rhythm.      Pulses: Normal pulses.      Heart sounds: Normal heart sounds.   Pulmonary:      Effort: Pulmonary effort is normal.      Breath sounds: Normal breath sounds.   Chest:   Breasts:      Right: No supraclavicular adenopathy.      Left: No supraclavicular adenopathy.       Musculoskeletal:         General: Normal range of motion.      Cervical back: Full passive range of motion without pain, normal range of motion and neck supple.   Lymphadenopathy:      Cervical: No cervical adenopathy.      Upper Body:      Right upper body: No supraclavicular adenopathy.      Left upper body: No supraclavicular adenopathy.   Skin:     General: Skin is warm and dry.      Capillary Refill: Capillary refill takes less than 2 seconds.      Findings: No rash.   Neurological:      General: No focal deficit present.      Mental Status: She is alert and oriented to person, place, and time.      Cranial Nerves: Cranial nerves are intact.      Sensory: Sensation is intact.      Motor: Motor function is intact.      Coordination: Coordination is intact.      Gait: Gait is intact.   Psychiatric:         Attention and Perception: Attention and perception normal.          Mood and Affect: Mood normal.         Behavior: Behavior normal.         Thought Content: Thought content normal.         Assessment /Associated Orders:      1. Migraine without aura and without status migrainosus, not intractable         Medical Decision Making:    Pt is clinically stable at today's acute urgent care visit.  No acute distress noted. Appropriate for outpatient management at this time.   Most likely migraine headache today. Reviewed ER precautions.   Acute problem today with uncertain prognosis.   Resume all prior  RX medications. Take as prescribed.   Keep well hydrated    Advised to follow-up with the primary care provider for recheck, reevaluation, and consideration of further management if necessary.   Discussed management options (risks,benefits, and alternatives to treatment). Expressed understanding and the treatment plan was agreed upon. Questions were encouraged and answered   Return to urgent care prn if new or worsening sx or if there is no improvement in condition prn.    Educated in Red flags and indications to immediately call 911 or present to the Emergency Department.     I personally reviewed prior external notes and test results pertinent to today's visit.  I have independently reviewed and interpreted all diagnostics ordered during this urgent care acute visit.   Time spent evaluating this patient was at least 30 minutes and includes preparing for visit, counseling/education, exam and evaluation, obtaining history, independent interpretation, ordering lab/test/procedures,medication management and documentation.Time does not include separately billable procedures noted .

## 2022-02-18 ASSESSMENT — ENCOUNTER SYMPTOMS
COUGH: 0
FEVER: 0
SENSORY CHANGE: 0
HEADACHES: 1
MYALGIAS: 0
FOCAL WEAKNESS: 0
SEIZURES: 0
WEAKNESS: 0
CHILLS: 0
FALLS: 0
DIZZINESS: 0

## 2022-02-18 ASSESSMENT — LIFESTYLE VARIABLES: SUBSTANCE_ABUSE: 0

## 2022-03-07 ENCOUNTER — OFFICE VISIT (OUTPATIENT)
Dept: MEDICAL GROUP | Facility: PHYSICIAN GROUP | Age: 32
End: 2022-03-07
Payer: COMMERCIAL

## 2022-03-07 VITALS
HEIGHT: 62 IN | RESPIRATION RATE: 16 BRPM | HEART RATE: 78 BPM | SYSTOLIC BLOOD PRESSURE: 100 MMHG | DIASTOLIC BLOOD PRESSURE: 66 MMHG | OXYGEN SATURATION: 98 % | BODY MASS INDEX: 25.25 KG/M2 | TEMPERATURE: 98.2 F | WEIGHT: 137.2 LBS

## 2022-03-07 DIAGNOSIS — R53.83 FATIGUE, UNSPECIFIED TYPE: ICD-10-CM

## 2022-03-07 DIAGNOSIS — G43.009 MIGRAINE WITHOUT AURA AND WITHOUT STATUS MIGRAINOSUS, NOT INTRACTABLE: ICD-10-CM

## 2022-03-07 DIAGNOSIS — Z00.00 WELLNESS EXAMINATION: ICD-10-CM

## 2022-03-07 DIAGNOSIS — G89.29 CHRONIC ABDOMINAL PAIN: ICD-10-CM

## 2022-03-07 DIAGNOSIS — R10.9 CHRONIC ABDOMINAL PAIN: ICD-10-CM

## 2022-03-07 PROCEDURE — 99214 OFFICE O/P EST MOD 30 MIN: CPT | Performed by: FAMILY MEDICINE

## 2022-03-07 RX ORDER — ONDANSETRON 4 MG/1
TABLET, FILM COATED ORAL
COMMUNITY
Start: 2022-01-27 | End: 2022-03-07

## 2022-03-07 RX ORDER — BENZONATATE 200 MG/1
CAPSULE ORAL
COMMUNITY
End: 2022-04-05

## 2022-03-07 RX ORDER — MELOXICAM 15 MG/1
15 TABLET ORAL
COMMUNITY
Start: 2022-01-20 | End: 2022-03-07

## 2022-03-07 RX ORDER — MELOXICAM 15 MG/1
TABLET ORAL
COMMUNITY
End: 2022-03-07

## 2022-03-07 RX ORDER — METHYLPREDNISOLONE 4 MG/1
TABLET ORAL
COMMUNITY
End: 2022-03-07

## 2022-03-07 RX ORDER — BENZONATATE 200 MG/1
CAPSULE ORAL
COMMUNITY
Start: 2022-01-07 | End: 2022-03-07

## 2022-03-07 RX ORDER — FLUTICASONE PROPIONATE 50 MCG
1 SPRAY, SUSPENSION (ML) NASAL DAILY
COMMUNITY

## 2022-03-07 RX ORDER — ALPRAZOLAM 0.5 MG/1
TABLET ORAL
COMMUNITY
End: 2022-10-25

## 2022-03-07 RX ORDER — PANTOPRAZOLE SODIUM 20 MG/1
20 TABLET, DELAYED RELEASE ORAL 2 TIMES DAILY
COMMUNITY
Start: 2021-12-13 | End: 2022-03-07

## 2022-03-07 RX ORDER — PANTOPRAZOLE SODIUM 20 MG/1
TABLET, DELAYED RELEASE ORAL
COMMUNITY
End: 2022-03-07

## 2022-03-07 RX ORDER — ONDANSETRON 4 MG/1
TABLET, FILM COATED ORAL
COMMUNITY
End: 2022-03-07

## 2022-03-07 ASSESSMENT — FIBROSIS 4 INDEX: FIB4 SCORE: 0.61

## 2022-03-07 ASSESSMENT — PATIENT HEALTH QUESTIONNAIRE - PHQ9: CLINICAL INTERPRETATION OF PHQ2 SCORE: 0

## 2022-03-07 NOTE — PROGRESS NOTES
Subjective:     CC:   Chief Complaint   Patient presents with   • Establish Care       HPI:   Abby presents today with her infant child due to the fact that she has had a chronic history of migraine headaches its been going on for more than 10 years.  Patient states that usually the Imitrex seems to help but now she is adding Advil on top of it.  Patient does see GI for digestive issues and they are following her up for that.  Patient is having some problems with sleep and anxiety she is taking some Xanax very occasionally for this.  Patient's birth control is nuvaring .    Past Medical History:   Diagnosis Date   • Anxiety and depression    • Migraine        Social History     Tobacco Use   • Smoking status: Never Smoker   • Smokeless tobacco: Never Used   Vaping Use   • Vaping Use: Never used   Substance Use Topics   • Alcohol use: Yes     Alcohol/week: 2.4 oz     Types: 4 Glasses of wine per week     Comment: Occ   • Drug use: No       Current Outpatient Medications Ordered in Epic   Medication Sig Dispense Refill   • benzonatate (TESSALON) 200 MG capsule benzonatate 200 mg capsule     • ALPRAZolam (XANAX) 0.5 MG Tab alprazolam 0.5 mg tablet   TAKE 1 TABLET BY MOUTH AT BEDTIME AS NEEDED     • fluticasone (FLONASE) 50 MCG/ACT nasal spray Administer 1 Spray into affected nostril(S) every day.     • ethinyl estradiol-etonogestrel (NUVARING) 0.12-0.015 MG/24HR vaginal ring INSERT 1 RING VAGINALLY AS DIRECTED, REMOVE AFTER 3 WEEKS AND WAIT 7 DAYS BEFORE INSERTING A NEW RING 3 Each 1   • polyethylene glycol 3350 (MIRALAX) 17 GM/SCOOP Powder Take 17 g by mouth every day. 225 g 3   • omeprazole (PRILOSEC) 20 MG delayed-release capsule Take 1 Cap by mouth every day. 30 Cap 0   • SUMAtriptan (IMITREX) 50 MG Tab Take 1 Tab by mouth Once PRN for Migraine for up to 1 dose. 9 Tab 2   • cyclobenzaprine (FLEXERIL) 10 MG Tab Take 1 Tab by mouth 3 times a day as needed. 30 Tab 0   • Blood Glucose Monitoring Suppl (ONE TOUCH  "ULTRA 2) w/Device Kit USE TO TEST BLOOD SUGAR IN THE MORNING BEFORE BREAKFAST OR DRINK ANYTHING     • Alcohol Swabs (ALCOHOL PADS) 70 % Pads      • ONETOUCH ULTRA strip      • multivitamin (THERAGRAN) Tab Take 1 Tab by mouth every day.       No current Epic-ordered facility-administered medications on file.       Allergies:  Patient has no known allergies.    Health Maintenance: Completed    ROS:  Gen: no fevers/chills  Eyes: no changes in vision  ENT: no sore throat, no hearing loss, no bloody nose  Pulm: no sob, no cough  CV: no chest pain, no palpitations  GI: no nausea/vomiting, no diarrhea  Skin: no rash  Neuro: no headaches, no numbness/tingling  Heme/Lymph: no easy bruising    Objective:     Exam:  /66   Pulse 78   Temp 36.8 °C (98.2 °F)   Resp 16   Ht 1.575 m (5' 2\")   Wt 62.2 kg (137 lb 3.2 oz)   LMP 02/21/2022   SpO2 98%   BMI 25.09 kg/m²  Body mass index is 25.09 kg/m².    Gen: Alert and oriented, No apparent distress.  Skin: Warm and dry.  No obvious lesions.  Eyes: Sclera wnl Pupils normal in size  Musculoskeletal: Normal gait. No extremity cyanosis, clubbing, or edema.  Neuro: Oriented to person, place and time  Psych: Mood is wnl       Labs: Patient given a listing of [facility] labs    Assessment & Plan:     31 y.o. female with the following -     1. Migraine without aura and without status migrainosus, not intractable  Unfortunately patient's child was very upset and tearful mother was trying to have a  come take care but the  backed out at the last minute.  At this time we will go ahead and get lab work done and I would like to see patient back in the next 1 to 2 weeks mother states that she will come by herself so she can focus more on what is going on with her.  This is a chronic problem  - Comp Metabolic Panel; Future    2. Fatigue, unspecified type  We will go ahead and get lab work done on patient this is a chronic problem  - CBC WITH DIFFERENTIAL; Future  - TSH; " Future  - TRIIDOTHYRONINE; Future    3. Chronic abdominal pain  Patient is seeing GI for this this is a chronic problem  - Comp Metabolic Panel; Future    4. Wellness examination  - Lipid Profile; Future      Return in about 2 weeks (around 3/21/2022).    Please note that this dictation was created using voice recognition software. I have made every reasonable attempt to correct obvious errors, but I expect that there are errors of grammar and possibly content that I did not discover before finalizing the note.

## 2022-03-08 DIAGNOSIS — G43.009 MIGRAINE WITHOUT AURA AND WITHOUT STATUS MIGRAINOSUS, NOT INTRACTABLE: ICD-10-CM

## 2022-03-08 RX ORDER — SUMATRIPTAN 50 MG/1
TABLET, FILM COATED ORAL
Qty: 9 TABLET | Refills: 1 | Status: SHIPPED | OUTPATIENT
Start: 2022-03-08

## 2022-03-17 ENCOUNTER — HOSPITAL ENCOUNTER (OUTPATIENT)
Dept: LAB | Facility: MEDICAL CENTER | Age: 32
End: 2022-03-17
Attending: FAMILY MEDICINE
Payer: COMMERCIAL

## 2022-03-17 DIAGNOSIS — R10.9 CHRONIC ABDOMINAL PAIN: ICD-10-CM

## 2022-03-17 DIAGNOSIS — R53.83 FATIGUE, UNSPECIFIED TYPE: ICD-10-CM

## 2022-03-17 DIAGNOSIS — G89.29 CHRONIC ABDOMINAL PAIN: ICD-10-CM

## 2022-03-17 DIAGNOSIS — G43.009 MIGRAINE WITHOUT AURA AND WITHOUT STATUS MIGRAINOSUS, NOT INTRACTABLE: ICD-10-CM

## 2022-03-17 DIAGNOSIS — Z00.00 WELLNESS EXAMINATION: ICD-10-CM

## 2022-03-17 LAB
ALBUMIN SERPL BCP-MCNC: 4.3 G/DL (ref 3.2–4.9)
ALBUMIN/GLOB SERPL: 1.6 G/DL
ALP SERPL-CCNC: 36 U/L (ref 30–99)
ALT SERPL-CCNC: 12 U/L (ref 2–50)
ANION GAP SERPL CALC-SCNC: 12 MMOL/L (ref 7–16)
AST SERPL-CCNC: 16 U/L (ref 12–45)
BASOPHILS # BLD AUTO: 0.3 % (ref 0–1.8)
BASOPHILS # BLD: 0.02 K/UL (ref 0–0.12)
BILIRUB SERPL-MCNC: 0.4 MG/DL (ref 0.1–1.5)
BUN SERPL-MCNC: 8 MG/DL (ref 8–22)
CALCIUM SERPL-MCNC: 9 MG/DL (ref 8.5–10.5)
CHLORIDE SERPL-SCNC: 105 MMOL/L (ref 96–112)
CHOLEST SERPL-MCNC: 183 MG/DL (ref 100–199)
CO2 SERPL-SCNC: 21 MMOL/L (ref 20–33)
CREAT SERPL-MCNC: 0.87 MG/DL (ref 0.5–1.4)
EOSINOPHIL # BLD AUTO: 0.07 K/UL (ref 0–0.51)
EOSINOPHIL NFR BLD: 1 % (ref 0–6.9)
ERYTHROCYTE [DISTWIDTH] IN BLOOD BY AUTOMATED COUNT: 42.5 FL (ref 35.9–50)
FASTING STATUS PATIENT QL REPORTED: NORMAL
GFR SERPLBLD CREATININE-BSD FMLA CKD-EPI: 91 ML/MIN/1.73 M 2
GLOBULIN SER CALC-MCNC: 2.7 G/DL (ref 1.9–3.5)
GLUCOSE SERPL-MCNC: 82 MG/DL (ref 65–99)
HCT VFR BLD AUTO: 42.1 % (ref 37–47)
HDLC SERPL-MCNC: 62 MG/DL
HGB BLD-MCNC: 13.9 G/DL (ref 12–16)
IMM GRANULOCYTES # BLD AUTO: 0.01 K/UL (ref 0–0.11)
IMM GRANULOCYTES NFR BLD AUTO: 0.1 % (ref 0–0.9)
LDLC SERPL CALC-MCNC: 98 MG/DL
LYMPHOCYTES # BLD AUTO: 4.2 K/UL (ref 1–4.8)
LYMPHOCYTES NFR BLD: 57.2 % (ref 22–41)
MCH RBC QN AUTO: 30.2 PG (ref 27–33)
MCHC RBC AUTO-ENTMCNC: 33 G/DL (ref 33.6–35)
MCV RBC AUTO: 91.3 FL (ref 81.4–97.8)
MONOCYTES # BLD AUTO: 0.65 K/UL (ref 0–0.85)
MONOCYTES NFR BLD AUTO: 8.9 % (ref 0–13.4)
NEUTROPHILS # BLD AUTO: 2.39 K/UL (ref 2–7.15)
NEUTROPHILS NFR BLD: 32.5 % (ref 44–72)
NRBC # BLD AUTO: 0 K/UL
NRBC BLD-RTO: 0 /100 WBC
PLATELET # BLD AUTO: 318 K/UL (ref 164–446)
PMV BLD AUTO: 10.5 FL (ref 9–12.9)
POTASSIUM SERPL-SCNC: 4.5 MMOL/L (ref 3.6–5.5)
PROT SERPL-MCNC: 7 G/DL (ref 6–8.2)
RBC # BLD AUTO: 4.61 M/UL (ref 4.2–5.4)
SODIUM SERPL-SCNC: 138 MMOL/L (ref 135–145)
T3 SERPL-MCNC: 195 NG/DL (ref 60–181)
TRIGL SERPL-MCNC: 117 MG/DL (ref 0–149)
TSH SERPL DL<=0.005 MIU/L-ACNC: 2.34 UIU/ML (ref 0.38–5.33)
WBC # BLD AUTO: 7.3 K/UL (ref 4.8–10.8)

## 2022-03-17 PROCEDURE — 80053 COMPREHEN METABOLIC PANEL: CPT

## 2022-03-17 PROCEDURE — 85025 COMPLETE CBC W/AUTO DIFF WBC: CPT

## 2022-03-17 PROCEDURE — 80061 LIPID PANEL: CPT

## 2022-03-17 PROCEDURE — 84443 ASSAY THYROID STIM HORMONE: CPT

## 2022-03-17 PROCEDURE — 84480 ASSAY TRIIODOTHYRONINE (T3): CPT

## 2022-03-17 PROCEDURE — 36415 COLL VENOUS BLD VENIPUNCTURE: CPT

## 2022-03-18 ENCOUNTER — OFFICE VISIT (OUTPATIENT)
Dept: MEDICAL GROUP | Facility: PHYSICIAN GROUP | Age: 32
End: 2022-03-18
Payer: COMMERCIAL

## 2022-03-18 VITALS
RESPIRATION RATE: 16 BRPM | OXYGEN SATURATION: 98 % | BODY MASS INDEX: 24.99 KG/M2 | DIASTOLIC BLOOD PRESSURE: 64 MMHG | WEIGHT: 135.8 LBS | HEART RATE: 75 BPM | SYSTOLIC BLOOD PRESSURE: 94 MMHG | TEMPERATURE: 98.7 F | HEIGHT: 62 IN

## 2022-03-18 DIAGNOSIS — R10.9 CHRONIC ABDOMINAL PAIN: ICD-10-CM

## 2022-03-18 DIAGNOSIS — R79.89 ABNORMAL CBC: ICD-10-CM

## 2022-03-18 DIAGNOSIS — G43.009 MIGRAINE WITHOUT AURA AND WITHOUT STATUS MIGRAINOSUS, NOT INTRACTABLE: ICD-10-CM

## 2022-03-18 DIAGNOSIS — R79.89 ABNORMAL THYROID BLOOD TEST: ICD-10-CM

## 2022-03-18 DIAGNOSIS — G89.29 CHRONIC ABDOMINAL PAIN: ICD-10-CM

## 2022-03-18 PROCEDURE — 99214 OFFICE O/P EST MOD 30 MIN: CPT | Performed by: FAMILY MEDICINE

## 2022-03-18 ASSESSMENT — FIBROSIS 4 INDEX: FIB4 SCORE: 0.46

## 2022-03-18 NOTE — PROGRESS NOTES
Subjective:     CC:   Chief Complaint   Patient presents with   • Follow-Up       HPI:   Abby presents today for follow-up.  Patient states that the Imitrex seems to help with her migraines.  Patient gives a history of having migraines for almost 12 years.  Patient feels the migraines are getting worse.  Patient's is still seeing GI she has a problem with digesting food and also due to fact she has a family history of colon cancer she needs to get a colonoscopy every 5 years.  On last colonoscopy everything was within normal limits    Past Medical History:   Diagnosis Date   • Anxiety and depression    • Migraine        Social History     Tobacco Use   • Smoking status: Never Smoker   • Smokeless tobacco: Never Used   Vaping Use   • Vaping Use: Never used   Substance Use Topics   • Alcohol use: Yes     Alcohol/week: 2.4 oz     Types: 4 Glasses of wine per week     Comment: Occ   • Drug use: No       Current Outpatient Medications Ordered in Epic   Medication Sig Dispense Refill   • SUMAtriptan (IMITREX) 50 MG Tab 1 p.o. when migraine occurs may repeat in 2 hours if it persists no more than 2 tablets in 1 day 9 Tablet 1   • benzonatate (TESSALON) 200 MG capsule benzonatate 200 mg capsule     • ALPRAZolam (XANAX) 0.5 MG Tab alprazolam 0.5 mg tablet   TAKE 1 TABLET BY MOUTH AT BEDTIME AS NEEDED     • fluticasone (FLONASE) 50 MCG/ACT nasal spray Administer 1 Spray into affected nostril(S) every day.     • ethinyl estradiol-etonogestrel (NUVARING) 0.12-0.015 MG/24HR vaginal ring INSERT 1 RING VAGINALLY AS DIRECTED, REMOVE AFTER 3 WEEKS AND WAIT 7 DAYS BEFORE INSERTING A NEW RING 3 Each 1   • polyethylene glycol 3350 (MIRALAX) 17 GM/SCOOP Powder Take 17 g by mouth every day. 225 g 3   • omeprazole (PRILOSEC) 20 MG delayed-release capsule Take 1 Cap by mouth every day. 30 Cap 0   • cyclobenzaprine (FLEXERIL) 10 MG Tab Take 1 Tab by mouth 3 times a day as needed. 30 Tab 0   • Blood Glucose Monitoring Suppl (ONE TOUCH  "ULTRA 2) w/Device Kit USE TO TEST BLOOD SUGAR IN THE MORNING BEFORE BREAKFAST OR DRINK ANYTHING     • Alcohol Swabs (ALCOHOL PADS) 70 % Pads      • ONETOUCH ULTRA strip      • multivitamin (THERAGRAN) Tab Take 1 Tab by mouth every day.       No current [facility]-ordered facility-administered medications on file.       Allergies:  Patient has no known allergies.    Health Maintenance: Completed    ROS:  Gen: no fevers/chills  Eyes: no changes in vision  ENT: no sore throat, no hearing loss, no bloody nose  Pulm: no sob, no cough  CV: no chest pain, no palpitations  GI: no nausea/vomiting, no diarrhea  Neuro: no headaches, no numbness/tingling  Heme/Lymph: no easy bruising    Objective:     Exam:  BP (!) 94/64   Pulse 75   Temp 37.1 °C (98.7 °F)   Resp 16   Ht 1.575 m (5' 2\")   Wt 61.6 kg (135 lb 12.8 oz)   LMP 02/21/2022   SpO2 98%   BMI 24.84 kg/m²  Body mass index is 24.84 kg/m².    Gen: Alert and oriented, No apparent distress.  Skin: Warm and dry.  No obvious lesions.  Eyes: Sclera wnl Pupils normal in size  Lungs: Normal effort, CTA bilaterally, no wheezes, rhonchi, or rales  CV: Regular rate and rhythm. No murmurs, rubs, or gallops.  ABD: Soft non-tender no organomegaly  Musculoskeletal: Normal gait. No extremity cyanosis, clubbing, or edema.  Neuro: Oriented to person, place and time  Psych: Mood is wnl       Assessment & Plan:     31 y.o. female with the following -     1. Migraine without aura and without status migrainosus, not intractable  The Imitrex seems to be helping but patient has had about 2 or 3 headaches since of last seen her is been less than a month recommend doing MRI of her brain.  Patient has never had imaging studies done on her brain.  This is a chronic problem  - MR-BRAIN-WITH & W/O; Future    2. Chronic abdominal pain  Patient is seeing gastroenterology for this patient continue follow-up this is a chronic problem       Return in about 4 weeks (around 4/15/2022).    Please note that " this dictation was created using voice recognition software. I have made every reasonable attempt to correct obvious errors, but I expect that there are errors of grammar and possibly content that I did not discover before finalizing the note.

## 2022-03-30 ENCOUNTER — HOSPITAL ENCOUNTER (OUTPATIENT)
Dept: RADIOLOGY | Facility: MEDICAL CENTER | Age: 32
End: 2022-03-30
Attending: NURSE PRACTITIONER
Payer: COMMERCIAL

## 2022-03-30 DIAGNOSIS — R11.0 NAUSEA: ICD-10-CM

## 2022-03-30 DIAGNOSIS — R10.13 EPIGASTRIC PAIN: ICD-10-CM

## 2022-03-30 PROCEDURE — 76700 US EXAM ABDOM COMPLETE: CPT

## 2022-04-05 ENCOUNTER — OFFICE VISIT (OUTPATIENT)
Dept: NEUROLOGY | Facility: MEDICAL CENTER | Age: 32
End: 2022-04-05
Attending: NURSE PRACTITIONER
Payer: COMMERCIAL

## 2022-04-05 VITALS
SYSTOLIC BLOOD PRESSURE: 100 MMHG | HEART RATE: 74 BPM | RESPIRATION RATE: 12 BRPM | WEIGHT: 135.6 LBS | DIASTOLIC BLOOD PRESSURE: 64 MMHG | TEMPERATURE: 98.1 F | OXYGEN SATURATION: 98 % | HEIGHT: 62 IN | BODY MASS INDEX: 24.95 KG/M2

## 2022-04-05 DIAGNOSIS — G43.709 CHRONIC MIGRAINE WITHOUT AURA WITHOUT STATUS MIGRAINOSUS, NOT INTRACTABLE: ICD-10-CM

## 2022-04-05 PROCEDURE — 99215 OFFICE O/P EST HI 40 MIN: CPT | Performed by: NURSE PRACTITIONER

## 2022-04-05 PROCEDURE — 99212 OFFICE O/P EST SF 10 MIN: CPT | Performed by: NURSE PRACTITIONER

## 2022-04-05 RX ORDER — TOPIRAMATE SPINKLE 25 MG/1
50 CAPSULE ORAL
Qty: 60 CAPSULE | Refills: 5 | Status: SHIPPED | OUTPATIENT
Start: 2022-04-05 | End: 2022-05-07

## 2022-04-05 ASSESSMENT — ENCOUNTER SYMPTOMS
SENSORY CHANGE: 0
DIZZINESS: 1
BACK PAIN: 0
TINGLING: 1
DOUBLE VISION: 0
INSOMNIA: 0
NECK PAIN: 1
FEVER: 0
FOCAL WEAKNESS: 1
PHOTOPHOBIA: 1
WEAKNESS: 0
VOMITING: 0
NERVOUS/ANXIOUS: 1
PALPITATIONS: 0
COUGH: 0
SINUS PAIN: 0
SHORTNESS OF BREATH: 0
HEADACHES: 1
NAUSEA: 0
BLURRED VISION: 0

## 2022-04-05 ASSESSMENT — FIBROSIS 4 INDEX: FIB4 SCORE: 0.46

## 2022-04-05 NOTE — PROGRESS NOTES
Subjective   HPI  Abby Call is a 31 y.o. female who presents with chronic migraine.    She was referred by Dr. Doreen Lin.     PMH:  Anxiety, migraines, GI issues    Social:  She denies tobacco or drug use, she drinks alcohol weekly,  She has a 3 year old at home.  She works as a , she is working from  Home.     Family:  3 year old son with chiari malformation.     Age at Onset:  22  Triggers unknown  Alleviating factors: laying down, pressure against head  Meds tried and result:         Preventative:  Medication Dose/length of treatment Result/side effects   None                                                      Abortive:   Medication Dose/length of treatment Result/side effects   Sumatriptan 50mg Knocks it out after 2 doses of 50mg.   Advil 200-400mg Makes it better                                        Hormones:  Nuvaring  Caffeine use:  Not daily   OTC medications--frequency:  200-400mg of Advil every other day  How many days per month: 20-30/30  Missed days of school/work in past 6 months:  5  Characteristics:                   A) Location:  Bi-temporal behind eyes             B)Duration:2-4 hours but then creeps back in a couple of more hours.              C)Intensity:  10/10              D) Quality of pain:  Random shooting, burning, pressure behind eyes and in the temples.               E) Associated Symptoms:  None   N&V:  nausea  Photo/phonophobia  Both   Aura:  None   Prodrome:  fatigue  ER/Urgent care visits in past 6 months 1  Sleep schedule  Not on regular schedule, toddler does not sleep through the night        Review of Systems   Constitutional: Negative for fever.   HENT: Negative for congestion and sinus pain.    Eyes: Positive for photophobia. Negative for blurred vision and double vision.   Respiratory: Negative for cough and shortness of breath.    Cardiovascular: Negative for chest pain and palpitations.   Gastrointestinal: Negative for nausea and vomiting.  "  Musculoskeletal: Positive for neck pain. Negative for back pain.   Neurological: Positive for dizziness, tingling, focal weakness and headaches. Negative for sensory change and weakness.        C/o right arm weakness   Psychiatric/Behavioral: The patient is nervous/anxious. The patient does not have insomnia.          Objective     /64 (BP Location: Right arm, Patient Position: Sitting, BP Cuff Size: Adult)   Pulse 74   Temp 36.7 °C (98.1 °F) (Temporal)   Resp 12   Ht 1.575 m (5' 2\")   Wt 61.5 kg (135 lb 9.6 oz)   SpO2 98%   BMI 24.80 kg/m²      PHYSICAL ASSESSMENT  Constitutional:  Alert, no apparent distress,  Psych:   mood and affect WNL  Muskuloskeletal:  Moves all extremities equally, strength 5/5  BUE/BLE flexors/extensors, no drift  NEUROLOGICAL ASSESSMENT  Oriented X 4, speech fluent, naming and memory intact  CN II: Visual fields are full to confrontation. Fundoscopic exam is normal with sharp discs and no vascular changes. Pupils are 4 mm and briskly reactive to light.   CN III: IV, VI  EOMs intact, no ptosis  CN V: Facial sensation is intact to pinprick in all 3 divisions bilaterally. Corneal responses are intact.  CN VII: Face is symmetric with normal eye closure and smile.  CN VIII Hearing is normal to rubbing fingers  CN IX, X: Palate elevates symmetrically. Phonation is normal.  CN XI: Head turning and shoulder shrug are intact  CN XII: Tongue is midline with normal movements and no atrophy.                           Sensation to PP equal bilaterally                 No limb ataxia with finger to nose and heel to shin                 Ambulates with steady gait.                 Rhomberg negative                Biceps,brachioradialis, tricep, and patellar reflexes all 2+     Cardiovascular:  , no peripheral edema  Neck:                     supple   Pulmonary:            Respirations easy, l   Skin:                     No obvious rashes.             Assessment & Plan      1. Chronic " migraine without aura without status migrainosus, not intractable       May have a component of medication overuse, decreased ibuprofen to no more than 4 ablets per week.     Start topiramate 25mg every night, then increase to 50mg (2 tabs) every night.    Continue Sumatriptan 50mg.      I recommend the following over the counter supplements every night at bedtime:  Start magnesium oxide 400mg gel cap by mouth every night, may take extra dose if needed for headache (over the counter), hold for diarrhea         Start Riboflavin (Vitamin B2) 400mg by mouth every night (over the counter),may turn urine bright yellow         Start COQ 10, take 300mg every night. (over the counter)          Attempt to go to bed and get up at the same time every night           Eat meals on regular basis            Stay hydrated.             Aerobic exercise 30 minutes daily             Avoid aged or smoked foods, avoid processed foods, red wine, aged cheese              Keep headache diary, include foods that you may have eaten.             Avoid overusing over the counter medications:  Do not take more than 500mg acetaminophen (tylenol), more than 4 times weekly, more frequent or larger doses are associated with medication overuse headache.          I spent 42 minutes caring for patient, my time includes reviewing the chart, obtaining current HPI, exam, discussion of disease process, ordering testing and medications and counseling.      I counseled patient on migraine triggers, lifestyle changes, medication overuse, supplements and medication side effects.

## 2022-04-05 NOTE — PATIENT INSTRUCTIONS
Start topiramate 25mg every night, then increase to 50mg (2 tabs) every night.           I recommend the following over the counter supplements every night at bedtime:  Start magnesium oxide 400mg gel cap by mouth every night, may take extra dose if needed for headache (over the counter), hold for diarrhea         Start Riboflavin (Vitamin B2) 400mg by mouth every night (over the counter),may turn urine bright yellow         Start COQ 10, take 300mg every night. (over the counter)          Attempt to go to bed and get up at the same time every night           Eat meals on regular basis            Stay hydrated.             Aerobic exercise 30 minutes daily             Avoid aged or smoked foods, avoid processed foods, red wine, aged cheese              Keep headache diary, include foods that you may have eaten.             Avoid overusing over the counter medications:  Do not take more than 500mg acetaminophen (tylenol), more than 4 times weekly, more frequent or larger doses are associated with medication overuse headache.            Migraine Headache  A migraine headache is a very strong throbbing pain on one side or both sides of your head. This type of headache can also cause other symptoms. It can last from 4 hours to 3 days. Talk with your doctor about what things may bring on (trigger) this condition.  What are the causes?  The exact cause of this condition is not known. This condition may be triggered or caused by:  · Drinking alcohol.  · Smoking.  · Taking medicines, such as:  ? Medicine used to treat chest pain (nitroglycerin).  ? Birth control pills.  ? Estrogen.  ? Some blood pressure medicines.  · Eating or drinking certain products.  · Doing physical activity.  Other things that may trigger a migraine headache include:  · Having a menstrual period.  · Pregnancy.  · Hunger.  · Stress.  · Not getting enough sleep or getting too much sleep.  · Weather changes.  · Tiredness (fatigue).  What increases the  risk?  · Being 25-55 years old.  · Being female.  · Having a family history of migraine headaches.  · Being .  · Having depression or anxiety.  · Being very overweight.  What are the signs or symptoms?  · A throbbing pain. This pain may:  ? Happen in any area of the head, such as on one side or both sides.  ? Make it hard to do daily activities.  ? Get worse with physical activity.  ? Get worse around bright lights or loud noises.  · Other symptoms may include:  ? Feeling sick to your stomach (nauseous).  ? Vomiting.  ? Dizziness.  ? Being sensitive to bright lights, loud noises, or smells.  · Before you get a migraine headache, you may get warning signs (an aura). An aura may include:  ? Seeing flashing lights or having blind spots.  ? Seeing bright spots, halos, or zigzag lines.  ? Having tunnel vision or blurred vision.  ? Having numbness or a tingling feeling.  ? Having trouble talking.  ? Having weak muscles.  · Some people have symptoms after a migraine headache (postdromal phase), such as:  ? Tiredness.  ? Trouble thinking (concentrating).  How is this treated?  · Taking medicines that:  ? Relieve pain.  ? Relieve the feeling of being sick to your stomach.  ? Prevent migraine headaches.  · Treatment may also include:  ? Having acupuncture.  ? Avoiding foods that bring on migraine headaches.  ? Learning ways to control your body functions (biofeedback).  ? Therapy to help you know and deal with negative thoughts (cognitive behavioral therapy).  Follow these instructions at home:  Medicines  · Take over-the-counter and prescription medicines only as told by your doctor.  · Ask your doctor if the medicine prescribed to you:  ? Requires you to avoid driving or using heavy machinery.  ? Can cause trouble pooping (constipation). You may need to take these steps to prevent or treat trouble pooping:  § Drink enough fluid to keep your pee (urine) pale yellow.  § Take over-the-counter or prescription  medicines.  § Eat foods that are high in fiber. These include beans, whole grains, and fresh fruits and vegetables.  § Limit foods that are high in fat and sugar. These include fried or sweet foods.  Lifestyle  · Do not drink alcohol.  · Do not use any products that contain nicotine or tobacco, such as cigarettes, e-cigarettes, and chewing tobacco. If you need help quitting, ask your doctor.  · Get at least 8 hours of sleep every night.  · Limit and deal with stress.  General instructions         · Keep a journal to find out what may bring on your migraine headaches. For example, write down:  ? What you eat and drink.  ? How much sleep you get.  ? Any change in what you eat or drink.  ? Any change in your medicines.  · If you have a migraine headache:  ? Avoid things that make your symptoms worse, such as bright lights.  ? It may help to lie down in a dark, quiet room.  ? Do not drive or use heavy machinery.  ? Ask your doctor what activities are safe for you.  · Keep all follow-up visits as told by your doctor. This is important.  Contact a doctor if:  · You get a migraine headache that is different or worse than others you have had.  · You have more than 15 headache days in one month.  Get help right away if:  · Your migraine headache gets very bad.  · Your migraine headache lasts longer than 72 hours.  · You have a fever.  · You have a stiff neck.  · You have trouble seeing.  · Your muscles feel weak or like you cannot control them.  · You start to lose your balance a lot.  · You start to have trouble walking.  · You pass out (faint).  · You have a seizure.  Summary  · A migraine headache is a very strong throbbing pain on one side or both sides of your head. These headaches can also cause other symptoms.  · This condition may be treated with medicines and changes to your lifestyle.  · Keep a journal to find out what may bring on your migraine headaches.  · Contact a doctor if you get a migraine headache that is  different or worse than others you have had.  · Contact your doctor if you have more than 15 headache days in a month.  This information is not intended to replace advice given to you by your health care provider. Make sure you discuss any questions you have with your health care provider.  Document Released: 09/26/2009 Document Revised: 04/10/2020 Document Reviewed: 01/30/2020  Elsevier Patient Education © 2020 [employer]

## 2022-04-26 ENCOUNTER — APPOINTMENT (OUTPATIENT)
Dept: MEDICAL GROUP | Facility: PHYSICIAN GROUP | Age: 32
End: 2022-04-26
Payer: COMMERCIAL

## 2022-05-04 DIAGNOSIS — G43.709 CHRONIC MIGRAINE WITHOUT AURA WITHOUT STATUS MIGRAINOSUS, NOT INTRACTABLE: ICD-10-CM

## 2022-05-06 NOTE — TELEPHONE ENCOUNTER
Received request via: Pharmacy    Was the patient seen in the last year in this department? Yes    lv   4/5/22  fv   NONE    Does the patient have an active prescription (recently filled or refills available) for medication(s) requested? YES

## 2022-05-07 RX ORDER — TOPIRAMATE SPINKLE 25 MG/1
50 CAPSULE ORAL
Qty: 60 CAPSULE | Refills: 5 | Status: SHIPPED | OUTPATIENT
Start: 2022-05-07 | End: 2022-10-25

## 2022-05-11 ENCOUNTER — OFFICE VISIT (OUTPATIENT)
Dept: MEDICAL GROUP | Facility: PHYSICIAN GROUP | Age: 32
End: 2022-05-11
Payer: COMMERCIAL

## 2022-05-11 VITALS
HEIGHT: 62 IN | BODY MASS INDEX: 25.21 KG/M2 | SYSTOLIC BLOOD PRESSURE: 100 MMHG | RESPIRATION RATE: 16 BRPM | WEIGHT: 137 LBS | TEMPERATURE: 98.5 F | OXYGEN SATURATION: 99 % | DIASTOLIC BLOOD PRESSURE: 68 MMHG | HEART RATE: 84 BPM

## 2022-05-11 DIAGNOSIS — G43.009 MIGRAINE WITHOUT AURA AND WITHOUT STATUS MIGRAINOSUS, NOT INTRACTABLE: ICD-10-CM

## 2022-05-11 DIAGNOSIS — R53.83 FATIGUE, UNSPECIFIED TYPE: ICD-10-CM

## 2022-05-11 PROCEDURE — 99214 OFFICE O/P EST MOD 30 MIN: CPT | Performed by: FAMILY MEDICINE

## 2022-05-11 ASSESSMENT — FIBROSIS 4 INDEX: FIB4 SCORE: 0.46

## 2022-05-11 NOTE — PROGRESS NOTES
Subjective:     CC:   Chief Complaint   Patient presents with   • Follow-Up       HPI:   Abby presents today for follow-up patient did see neurology here and would like a second opinion to be sent to [facility].  Patient was concerned about the medications to help her headaches.  Patient is also feeling very fatigued at times.  I did mention possibility of getting a Tdap shot patient was pregnant in 2019 more than likely she had a Tdap shot given in 2019 would ask her to call her GYN doctor to verify this.    Past Medical History:   Diagnosis Date   • Anxiety and depression    • Migraine        Social History     Tobacco Use   • Smoking status: Never Smoker   • Smokeless tobacco: Never Used   Vaping Use   • Vaping Use: Never used   Substance Use Topics   • Alcohol use: Yes     Alcohol/week: 2.4 oz     Types: 4 Glasses of wine per week     Comment: Occ   • Drug use: No       Current Outpatient Medications Ordered in Epic   Medication Sig Dispense Refill   • topiramate (TOPAMAX) 25 MG capsule TAKE 2 CAPSULES BY MOUTH AT BEDTIME 60 Capsule 5   • diclofenac sodium (VOLTAREN) 1 % Gel APPLY 2 GRAM TOPICALLY TO THE ENTIRE AFFECTED AREAS UP TO 4 TIMES A DAY ONLY AS NEEDED FOR NECK PAIN     • SUMAtriptan (IMITREX) 50 MG Tab 1 p.o. when migraine occurs may repeat in 2 hours if it persists no more than 2 tablets in 1 day 9 Tablet 1   • ALPRAZolam (XANAX) 0.5 MG Tab alprazolam 0.5 mg tablet   TAKE 1 TABLET BY MOUTH AT BEDTIME AS NEEDED     • fluticasone (FLONASE) 50 MCG/ACT nasal spray Administer 1 Spray into affected nostril(S) every day.     • ethinyl estradiol-etonogestrel (NUVARING) 0.12-0.015 MG/24HR vaginal ring INSERT 1 RING VAGINALLY AS DIRECTED, REMOVE AFTER 3 WEEKS AND WAIT 7 DAYS BEFORE INSERTING A NEW RING 3 Each 1   • polyethylene glycol 3350 (MIRALAX) 17 GM/SCOOP Powder Take 17 g by mouth every day. 225 g 3   • omeprazole (PRILOSEC) 20 MG delayed-release capsule Take 1 Cap by mouth every day. 30 Cap 0   •  "cyclobenzaprine (FLEXERIL) 10 MG Tab Take 1 Tab by mouth 3 times a day as needed. 30 Tab 0   • Blood Glucose Monitoring Suppl (ONE TOUCH ULTRA 2) w/Device Kit USE TO TEST BLOOD SUGAR IN THE MORNING BEFORE BREAKFAST OR DRINK ANYTHING     • Alcohol Swabs (ALCOHOL PADS) 70 % Pads      • ONETOUCH ULTRA strip      • multivitamin (THERAGRAN) Tab Take 1 Tab by mouth every day.       No current [facility]-ordered facility-administered medications on file.       Allergies:  Patient has no known allergies.    Health Maintenance: Completed    ROS:  Gen: no fevers/chills, no changes in weight  Eyes: no changes in vision  ENT: no sore throat, no hearing loss, no bloody nose  Pulm: no sob, no cough  CV: no chest pain, no palpitations  GI: no nausea/vomiting, no diarrhea  Neuro:  no numbness/tingling  Heme/Lymph: no easy bruising    Objective:     Exam:  /68   Pulse 84   Temp 36.9 °C (98.5 °F)   Resp 16   Ht 1.575 m (5' 2\")   Wt 62.1 kg (137 lb)   LMP 04/19/2022   SpO2 99%   BMI 25.06 kg/m²  Body mass index is 25.06 kg/m².    Gen: Alert and oriented, No apparent distress.  Skin: Warm and dry.  No obvious lesions.  Eyes: Sclera wnl Pupils normal in size  Lungs: Normal effort, CTA bilaterally, no wheezes, rhonchi, or rales  CV: Regular rate and rhythm. No murmurs, rubs, or gallops.  ABD: Soft non-tender no organomegaly  Musculoskeletal: Normal gait. No extremity cyanosis, clubbing, or edema.  Neuro: Oriented to person, place and time  Psych: Mood is wnl       Labs: Recommend we get some lab work done on patient I should see patient back for follow-up    Assessment & Plan:     31 y.o. female with the following -     1. Fatigue, unspecified type  Recommend getting lab work done on patient we will see patient back in about 3 weeks this is a chronic problem  - Comp Metabolic Panel; Future  - CBC WITH DIFFERENTIAL; Future  - TSH; Future  - TRIIDOTHYRONINE; Future  - FERRITIN; Future    2. Migraine without aura and without status " migrainosus, not intractable  Referral was written to [place]  neurology this is a chronic problem  - Referral to Neurology       Return in about 3 weeks (around 6/1/2022).    Please note that this dictation was created using voice recognition software. I have made every reasonable attempt to correct obvious errors, but I expect that there are errors of grammar and possibly content that I did not discover before finalizing the note.

## 2022-05-12 ENCOUNTER — HOSPITAL ENCOUNTER (OUTPATIENT)
Dept: LAB | Facility: MEDICAL CENTER | Age: 32
End: 2022-05-12
Attending: FAMILY MEDICINE
Payer: COMMERCIAL

## 2022-05-12 DIAGNOSIS — R53.83 FATIGUE, UNSPECIFIED TYPE: ICD-10-CM

## 2022-05-12 LAB
ALBUMIN SERPL BCP-MCNC: 4.3 G/DL (ref 3.2–4.9)
ALBUMIN/GLOB SERPL: 1.3 G/DL
ALP SERPL-CCNC: 41 U/L (ref 30–99)
ALT SERPL-CCNC: 14 U/L (ref 2–50)
ANION GAP SERPL CALC-SCNC: 11 MMOL/L (ref 7–16)
AST SERPL-CCNC: 16 U/L (ref 12–45)
BASOPHILS # BLD AUTO: 0.4 % (ref 0–1.8)
BASOPHILS # BLD: 0.03 K/UL (ref 0–0.12)
BILIRUB SERPL-MCNC: 0.3 MG/DL (ref 0.1–1.5)
BUN SERPL-MCNC: 12 MG/DL (ref 8–22)
CALCIUM SERPL-MCNC: 9.1 MG/DL (ref 8.5–10.5)
CHLORIDE SERPL-SCNC: 108 MMOL/L (ref 96–112)
CO2 SERPL-SCNC: 21 MMOL/L (ref 20–33)
CREAT SERPL-MCNC: 0.86 MG/DL (ref 0.5–1.4)
EOSINOPHIL # BLD AUTO: 0.06 K/UL (ref 0–0.51)
EOSINOPHIL NFR BLD: 0.7 % (ref 0–6.9)
ERYTHROCYTE [DISTWIDTH] IN BLOOD BY AUTOMATED COUNT: 40.1 FL (ref 35.9–50)
FERRITIN SERPL-MCNC: 72.8 NG/ML (ref 10–291)
GFR SERPLBLD CREATININE-BSD FMLA CKD-EPI: 92 ML/MIN/1.73 M 2
GLOBULIN SER CALC-MCNC: 3.2 G/DL (ref 1.9–3.5)
GLUCOSE SERPL-MCNC: 84 MG/DL (ref 65–99)
HCT VFR BLD AUTO: 41.8 % (ref 37–47)
HGB BLD-MCNC: 14.4 G/DL (ref 12–16)
IMM GRANULOCYTES # BLD AUTO: 0.02 K/UL (ref 0–0.11)
IMM GRANULOCYTES NFR BLD AUTO: 0.2 % (ref 0–0.9)
LYMPHOCYTES # BLD AUTO: 4.03 K/UL (ref 1–4.8)
LYMPHOCYTES NFR BLD: 49.1 % (ref 22–41)
MCH RBC QN AUTO: 31.1 PG (ref 27–33)
MCHC RBC AUTO-ENTMCNC: 34.4 G/DL (ref 33.6–35)
MCV RBC AUTO: 90.3 FL (ref 81.4–97.8)
MONOCYTES # BLD AUTO: 0.66 K/UL (ref 0–0.85)
MONOCYTES NFR BLD AUTO: 8 % (ref 0–13.4)
NEUTROPHILS # BLD AUTO: 3.4 K/UL (ref 2–7.15)
NEUTROPHILS NFR BLD: 41.6 % (ref 44–72)
NRBC # BLD AUTO: 0 K/UL
NRBC BLD-RTO: 0 /100 WBC
PLATELET # BLD AUTO: 310 K/UL (ref 164–446)
PMV BLD AUTO: 10.3 FL (ref 9–12.9)
POTASSIUM SERPL-SCNC: 4 MMOL/L (ref 3.6–5.5)
PROT SERPL-MCNC: 7.5 G/DL (ref 6–8.2)
RBC # BLD AUTO: 4.63 M/UL (ref 4.2–5.4)
SODIUM SERPL-SCNC: 140 MMOL/L (ref 135–145)
T3 SERPL-MCNC: 186 NG/DL (ref 60–181)
TSH SERPL DL<=0.005 MIU/L-ACNC: 1.92 UIU/ML (ref 0.38–5.33)
WBC # BLD AUTO: 8.2 K/UL (ref 4.8–10.8)

## 2022-05-12 PROCEDURE — 82728 ASSAY OF FERRITIN: CPT

## 2022-05-12 PROCEDURE — 84480 ASSAY TRIIODOTHYRONINE (T3): CPT

## 2022-05-12 PROCEDURE — 36415 COLL VENOUS BLD VENIPUNCTURE: CPT

## 2022-05-12 PROCEDURE — 84443 ASSAY THYROID STIM HORMONE: CPT

## 2022-05-12 PROCEDURE — 85025 COMPLETE CBC W/AUTO DIFF WBC: CPT

## 2022-05-12 PROCEDURE — 80053 COMPREHEN METABOLIC PANEL: CPT

## 2022-05-31 RX ORDER — ONDANSETRON 4 MG/1
TABLET, FILM COATED ORAL
Qty: 20 TABLET | Refills: 0 | Status: SHIPPED | OUTPATIENT
Start: 2022-05-31 | End: 2022-07-29 | Stop reason: SDUPTHER

## 2022-06-30 ENCOUNTER — OFFICE VISIT (OUTPATIENT)
Dept: MEDICAL GROUP | Facility: PHYSICIAN GROUP | Age: 32
End: 2022-06-30
Payer: COMMERCIAL

## 2022-06-30 ENCOUNTER — PATIENT MESSAGE (OUTPATIENT)
Dept: MEDICAL GROUP | Facility: PHYSICIAN GROUP | Age: 32
End: 2022-06-30

## 2022-06-30 VITALS
SYSTOLIC BLOOD PRESSURE: 102 MMHG | DIASTOLIC BLOOD PRESSURE: 64 MMHG | TEMPERATURE: 98.4 F | BODY MASS INDEX: 24.84 KG/M2 | RESPIRATION RATE: 16 BRPM | OXYGEN SATURATION: 99 % | HEART RATE: 80 BPM | WEIGHT: 135 LBS | HEIGHT: 62 IN

## 2022-06-30 DIAGNOSIS — B34.9 PHARYNGITIS WITH VIRAL SYNDROME: ICD-10-CM

## 2022-06-30 DIAGNOSIS — J02.9 PHARYNGITIS WITH VIRAL SYNDROME: ICD-10-CM

## 2022-06-30 DIAGNOSIS — Z30.015 ENCOUNTER FOR INITIAL PRESCRIPTION OF VAGINAL RING HORMONAL CONTRACEPTIVE: ICD-10-CM

## 2022-06-30 PROCEDURE — 99213 OFFICE O/P EST LOW 20 MIN: CPT | Performed by: NURSE PRACTITIONER

## 2022-06-30 RX ORDER — ETONOGESTREL AND ETHINYL ESTRADIOL VAGINAL RING .015; .12 MG/D; MG/D
RING VAGINAL
Qty: 3 EACH | Refills: 2 | Status: SHIPPED | OUTPATIENT
Start: 2022-06-30 | End: 2022-12-06 | Stop reason: SINTOL

## 2022-06-30 ASSESSMENT — FIBROSIS 4 INDEX: FIB4 SCORE: 0.44

## 2022-06-30 NOTE — PROGRESS NOTES
Chief Complaint   Patient presents with   • Influenza     cough        HPI: This is a 31 y.o. patient has 2 days of sore throat, cough and congestion, runny nose. No ear fullness. No abnormal shortness of breath. No nausea vomiting or diarrhea. No fever and chills. Her son is sick and tested positive for Influenza a. No coughing up blood. Mild headache.  Patient has taken over-the-counter analgesics and decongestant with relief.     ROS:  No fever, nausea, changes in bowel movements or skin rash.      I reviewed the patient's medications, allergies and medical history:  Current Outpatient Medications   Medication Sig Dispense Refill   • ethinyl estradiol-etonogestrel (NUVARING) 0.12-0.015 MG/24HR vaginal ring INSERT 1 RING VAGINALLY AS DIRECTED, REMOVE AFTER 3 WEEKS AND WAIT 7 DAYS BEFORE INSERTING A NEW RING 3 Each 2   • ondansetron (ZOFRAN) 4 MG Tab tablet TAKE 1 TABLET BY MOUTH EVERY 4 HOURS AS NEEDED FOR NAUSEA OR VOMITING 20 Tablet 0   • topiramate (TOPAMAX) 25 MG capsule TAKE 2 CAPSULES BY MOUTH AT BEDTIME 60 Capsule 5   • diclofenac sodium (VOLTAREN) 1 % Gel APPLY 2 GRAM TOPICALLY TO THE ENTIRE AFFECTED AREAS UP TO 4 TIMES A DAY ONLY AS NEEDED FOR NECK PAIN     • SUMAtriptan (IMITREX) 50 MG Tab 1 p.o. when migraine occurs may repeat in 2 hours if it persists no more than 2 tablets in 1 day 9 Tablet 1   • ALPRAZolam (XANAX) 0.5 MG Tab alprazolam 0.5 mg tablet   TAKE 1 TABLET BY MOUTH AT BEDTIME AS NEEDED     • fluticasone (FLONASE) 50 MCG/ACT nasal spray Administer 1 Spray into affected nostril(S) every day.     • polyethylene glycol 3350 (MIRALAX) 17 GM/SCOOP Powder Take 17 g by mouth every day. 225 g 3   • omeprazole (PRILOSEC) 20 MG delayed-release capsule Take 1 Cap by mouth every day. 30 Cap 0   • cyclobenzaprine (FLEXERIL) 10 MG Tab Take 1 Tab by mouth 3 times a day as needed. 30 Tab 0   • Blood Glucose Monitoring Suppl (ONE TOUCH ULTRA 2) w/Device Kit USE TO TEST BLOOD SUGAR IN THE MORNING BEFORE  "BREAKFAST OR DRINK ANYTHING     • Alcohol Swabs (ALCOHOL PADS) 70 % Pads      • ONETOUCH ULTRA strip      • multivitamin (THERAGRAN) Tab Take 1 Tab by mouth every day.       No current facility-administered medications for this visit.     Patient has no known allergies.  Past Medical History:   Diagnosis Date   • Anxiety and depression    • Migraine         EXAM:  /64 (BP Location: Right arm, Patient Position: Sitting, BP Cuff Size: Adult)   Pulse 80   Temp 36.9 °C (98.4 °F) (Temporal)   Resp 16   Ht 1.575 m (5' 2\")   Wt 61.2 kg (135 lb)   SpO2 99%   General: NAD, non-toxic appearance.  Eyes: PERRL, conjunctiva slightly injected, no photophobia or eye discharge.  Ears: Normal pinnae. TM's pearly gray with good landmarks bilaterally.  Nares: Patent with thin, clear mucus.  Sinuses: Non-tender over maxillary and frontal sinuses.  Throat: Erythematous injection with 1+ enlarged tonsils. No exudates.   Neck: Supple, with shotty anterior cervical lymphadenopathy.  Lungs: Good air entry bilaterally, clear to auscultation. No wheeze, rhonchi or crackles. Normal respiratory effort.  Heart: Regular rate without murmur.  Abdomen: Soft and non-tender. No hepatosplenomegaly.  Skin: Warm and dry. No rash.     Results for orders placed or performed during the hospital encounter of 05/12/22   FERRITIN   Result Value Ref Range    Ferritin 72.8 10.0 - 291.0 ng/mL   TRIIDOTHYRONINE   Result Value Ref Range    T3 186.0 (H) 60.0 - 181.0 ng/dL   TSH   Result Value Ref Range    TSH 1.920 0.380 - 5.330 uIU/mL   CBC WITH DIFFERENTIAL   Result Value Ref Range    WBC 8.2 4.8 - 10.8 K/uL    RBC 4.63 4.20 - 5.40 M/uL    Hemoglobin 14.4 12.0 - 16.0 g/dL    Hematocrit 41.8 37.0 - 47.0 %    MCV 90.3 81.4 - 97.8 fL    MCH 31.1 27.0 - 33.0 pg    MCHC 34.4 33.6 - 35.0 g/dL    RDW 40.1 35.9 - 50.0 fL    Platelet Count 310 164 - 446 K/uL    MPV 10.3 9.0 - 12.9 fL    Neutrophils-Polys 41.60 (L) 44.00 - 72.00 %    Lymphocytes 49.10 (H) 22.00 " - 41.00 %    Monocytes 8.00 0.00 - 13.40 %    Eosinophils 0.70 0.00 - 6.90 %    Basophils 0.40 0.00 - 1.80 %    Immature Granulocytes 0.20 0.00 - 0.90 %    Nucleated RBC 0.00 /100 WBC    Neutrophils (Absolute) 3.40 2.00 - 7.15 K/uL    Lymphs (Absolute) 4.03 1.00 - 4.80 K/uL    Monos (Absolute) 0.66 0.00 - 0.85 K/uL    Eos (Absolute) 0.06 0.00 - 0.51 K/uL    Baso (Absolute) 0.03 0.00 - 0.12 K/uL    Immature Granulocytes (abs) 0.02 0.00 - 0.11 K/uL    NRBC (Absolute) 0.00 K/uL   Comp Metabolic Panel   Result Value Ref Range    Sodium 140 135 - 145 mmol/L    Potassium 4.0 3.6 - 5.5 mmol/L    Chloride 108 96 - 112 mmol/L    Co2 21 20 - 33 mmol/L    Anion Gap 11.0 7.0 - 16.0    Glucose 84 65 - 99 mg/dL    Bun 12 8 - 22 mg/dL    Creatinine 0.86 0.50 - 1.40 mg/dL    Calcium 9.1 8.5 - 10.5 mg/dL    AST(SGOT) 16 12 - 45 U/L    ALT(SGPT) 14 2 - 50 U/L    Alkaline Phosphatase 41 30 - 99 U/L    Total Bilirubin 0.3 0.1 - 1.5 mg/dL    Albumin 4.3 3.2 - 4.9 g/dL    Total Protein 7.5 6.0 - 8.2 g/dL    Globulin 3.2 1.9 - 3.5 g/dL    A-G Ratio 1.3 g/dL   ESTIMATED GFR   Result Value Ref Range    GFR (CKD-EPI) 92 >60 mL/min/1.73 m 2       ASSESSMENT:   1. Pharyngitis with viral syndrome       PLAN:  1. Discussed benign nature of viral upper respiratory infections.  Negative Covid, Influenza A and influenza B in the office today.  2. OTC anti-pyretics and decongestants as needed. Supportive care advised.  3. Follow-up in office or urgent care for worsening symptoms, difficulty breathing, lack of expected recovery, or should new symptoms or problems arise.

## 2022-07-05 ENCOUNTER — OFFICE VISIT (OUTPATIENT)
Dept: URGENT CARE | Facility: PHYSICIAN GROUP | Age: 32
End: 2022-07-05
Payer: COMMERCIAL

## 2022-07-05 VITALS
RESPIRATION RATE: 12 BRPM | HEIGHT: 62 IN | WEIGHT: 133 LBS | DIASTOLIC BLOOD PRESSURE: 72 MMHG | SYSTOLIC BLOOD PRESSURE: 120 MMHG | BODY MASS INDEX: 24.48 KG/M2 | OXYGEN SATURATION: 100 % | HEART RATE: 77 BPM | TEMPERATURE: 97.9 F

## 2022-07-05 DIAGNOSIS — M79.672 LEFT FOOT PAIN: ICD-10-CM

## 2022-07-05 PROCEDURE — 99213 OFFICE O/P EST LOW 20 MIN: CPT | Performed by: PHYSICIAN ASSISTANT

## 2022-07-05 RX ORDER — IBUPROFEN 800 MG/1
800 TABLET ORAL EVERY 8 HOURS PRN
Qty: 30 TABLET | Refills: 0 | Status: SHIPPED | OUTPATIENT
Start: 2022-07-05 | End: 2024-01-11

## 2022-07-05 ASSESSMENT — ENCOUNTER SYMPTOMS
VOMITING: 0
CHILLS: 0
FALLS: 0
TINGLING: 0
WEAKNESS: 0
FEVER: 0
MYALGIAS: 1
NAUSEA: 0

## 2022-07-05 ASSESSMENT — FIBROSIS 4 INDEX: FIB4 SCORE: 0.44

## 2022-07-06 NOTE — PROGRESS NOTES
Subjective:     CHIEF COMPLAINT  Chief Complaint   Patient presents with   • Foot Pain     L foot pain x3 days        HPI  Abby Call is a very pleasant 31 y.o. female who presents to the clinic with atraumatic left foot pain x3 days.  Pain is located over the dorsal aspect of the left midfoot.  Denies any injury or trauma.  No change in activity.  No change in footwear.  Pain is described as a dull constant ache.  Has not noticed any overlying swelling or discoloration.  No history of gout.  No fevers, chills or myalgias.  Has been applying topical pain relieving cream with minimal improvement.    REVIEW OF SYSTEMS  Review of Systems   Constitutional: Negative for chills, fever and malaise/fatigue.   Gastrointestinal: Negative for nausea and vomiting.   Musculoskeletal: Positive for myalgias. Negative for falls.   Skin: Negative for rash.   Neurological: Negative for tingling and weakness.       PAST MEDICAL HISTORY  Patient Active Problem List    Diagnosis Date Noted   • Pharyngitis with viral syndrome 06/30/2022   • Chronic migraine without aura without status migrainosus, not intractable 04/05/2022   • Benign papule 11/09/2021   • Chronic abdominal pain 08/11/2020   • Fatigue 07/15/2020   • Hip pain, left 05/08/2019   • Environmental allergies 05/08/2019   • Neutropenia (HCC) 10/03/2017   • Migraine without aura and without status migrainosus, not intractable 09/11/2017   • Anxiety and depression 09/11/2017   • Insomnia disorder 09/11/2017   • Encounter for birth control 09/11/2017       SURGICAL HISTORY   has a past surgical history that includes primary c section (01/31/2019).    ALLERGIES  No Known Allergies    CURRENT MEDICATIONS  Home Medications     Reviewed by Valdo Gray P.A.-C. (Physician Assistant) on 07/05/22 at 1703  Med List Status: <None>   Medication Last Dose Status   Alcohol Swabs (ALCOHOL PADS) 70 % Pads PRN Active   ALPRAZolam (XANAX) 0.5 MG Tab PRN Active   Blood Glucose  "Monitoring Suppl (ONE TOUCH ULTRA 2) w/Device Kit Taking Active   cyclobenzaprine (FLEXERIL) 10 MG Tab PRN Active   diclofenac sodium (VOLTAREN) 1 % Gel Taking Active   ethinyl estradiol-etonogestrel (NUVARING) 0.12-0.015 MG/24HR vaginal ring Taking Active   fluticasone (FLONASE) 50 MCG/ACT nasal spray Taking Active   multivitamin (THERAGRAN) Tab Taking Active   omeprazole (PRILOSEC) 20 MG delayed-release capsule Taking Active   ONETOUCH ULTRA strip Taking Active   polyethylene glycol 3350 (MIRALAX) 17 GM/SCOOP Powder PRN Active   SUMAtriptan (IMITREX) 50 MG Tab PRN Active   topiramate (TOPAMAX) 25 MG capsule Not Taking Active                SOCIAL HISTORY  Social History     Tobacco Use   • Smoking status: Never Smoker   • Smokeless tobacco: Never Used   Vaping Use   • Vaping Use: Never used   Substance and Sexual Activity   • Alcohol use: Yes     Alcohol/week: 2.4 oz     Types: 4 Glasses of wine per week     Comment: Occ   • Drug use: No   • Sexual activity: Not Currently     Partners: Male       FAMILY HISTORY  Family History   Problem Relation Age of Onset   • Hypertension Mother    • Cancer Father    • Cancer Maternal Aunt         Breast cancer   • Cancer Maternal Grandmother         Breast cancer   • Arthritis Maternal Grandmother    • Cancer Paternal Grandfather         Prostate CA   • Lung Disease Maternal Grandfather    • Lung Disease Son           Objective:     VITAL SIGNS: /72   Pulse 77   Temp 36.6 °C (97.9 °F) (Temporal)   Resp 12   Ht 1.575 m (5' 2\")   Wt 60.3 kg (133 lb)   SpO2 100%   BMI 24.33 kg/m²     PHYSICAL EXAM  Physical Exam  Constitutional:       Appearance: Normal appearance.   HENT:      Head: Normocephalic and atraumatic.   Musculoskeletal:         General: No swelling or deformity. Normal range of motion.      Comments: Left foot: No obvious deformity, discoloration or edema.  Patient has full and pain-free range of motion of the left ankle.  No tenderness to palpation over " the medial or lateral malleoli.  Mild tenderness diffusely over the midfoot.  No tenderness over the plantar aspect of the foot.  No tenderness over the metatarsals.  Normal gait without a limp.   Skin:     Capillary Refill: Capillary refill takes less than 2 seconds.      Findings: No bruising, erythema or rash.   Neurological:      General: No focal deficit present.      Mental Status: She is alert and oriented to person, place, and time. Mental status is at baseline.         Assessment/Plan:     1. Left foot pain  - ibuprofen (MOTRIN) 800 MG Tab; Take 1 Tablet by mouth every 8 hours as needed for Moderate Pain or Inflammation.  Dispense: 30 Tablet; Refill: 0      MDM/Comments:    Patient with atraumatic foot pain x3 days.  No skin changes or signs concerning for infection at this time.  No history of gout.  Believe likely inflammatory tendinitis at this time.  Advised ice and elevation as well as anti-inflammatory use to decrease pain and inflammation.  Discussed proper fitting footwear.  If symptoms fail to improve advised to follow-up and potential imaging at that time.    Differential diagnosis, natural history, supportive care, and indications for immediate follow-up discussed. All questions answered. Patient agrees with the plan of care.    Follow-up as needed if symptoms worsen or fail to improve to PCP, Urgent care or Emergency Room.    I have personally reviewed prior external notes and test results pertinent to today's visit.  I have independently reviewed and interpreted all diagnostics ordered during this urgent care acute visit.   Discussed management options (risks,benefits, and alternatives to treatment). Pt expresses understanding and the treatment plan was agreed upon. Questions were encouraged and answered to pt's satisfaction.    Please note that this dictation was created using voice recognition software. I have made a reasonable attempt to correct obvious errors, but I expect that there are  errors of grammar and possibly content that I did not discover before finalizing the note.

## 2022-07-29 ENCOUNTER — PATIENT MESSAGE (OUTPATIENT)
Dept: MEDICAL GROUP | Facility: PHYSICIAN GROUP | Age: 32
End: 2022-07-29
Payer: COMMERCIAL

## 2022-07-29 RX ORDER — ONDANSETRON 4 MG/1
TABLET, FILM COATED ORAL
Qty: 20 TABLET | Refills: 0 | Status: SHIPPED | OUTPATIENT
Start: 2022-07-29 | End: 2022-08-28

## 2022-08-09 ENCOUNTER — OFFICE VISIT (OUTPATIENT)
Dept: MEDICAL GROUP | Facility: PHYSICIAN GROUP | Age: 32
End: 2022-08-09
Payer: COMMERCIAL

## 2022-08-09 ENCOUNTER — HOSPITAL ENCOUNTER (OUTPATIENT)
Facility: MEDICAL CENTER | Age: 32
End: 2022-08-09
Attending: NURSE PRACTITIONER
Payer: COMMERCIAL

## 2022-08-09 VITALS
HEART RATE: 100 BPM | BODY MASS INDEX: 25.76 KG/M2 | WEIGHT: 140 LBS | TEMPERATURE: 98 F | OXYGEN SATURATION: 97 % | DIASTOLIC BLOOD PRESSURE: 68 MMHG | SYSTOLIC BLOOD PRESSURE: 106 MMHG | HEIGHT: 62 IN

## 2022-08-09 DIAGNOSIS — R09.81 NASAL CONGESTION: ICD-10-CM

## 2022-08-09 DIAGNOSIS — J03.90 TONSILLITIS: ICD-10-CM

## 2022-08-09 LAB
EXTERNAL QUALITY CONTROL: NORMAL
INT CON NEG: NORMAL
INT CON POS: NORMAL
S PYO AG THROAT QL: NORMAL
SARS-COV+SARS-COV-2 AG RESP QL IA.RAPID: NEGATIVE

## 2022-08-09 PROCEDURE — 87070 CULTURE OTHR SPECIMN AEROBIC: CPT

## 2022-08-09 PROCEDURE — 87426 SARSCOV CORONAVIRUS AG IA: CPT | Performed by: NURSE PRACTITIONER

## 2022-08-09 PROCEDURE — 87880 STREP A ASSAY W/OPTIC: CPT | Performed by: NURSE PRACTITIONER

## 2022-08-09 PROCEDURE — 99213 OFFICE O/P EST LOW 20 MIN: CPT | Performed by: NURSE PRACTITIONER

## 2022-08-09 RX ORDER — AMITRIPTYLINE HYDROCHLORIDE 25 MG/1
TABLET, FILM COATED ORAL
COMMUNITY
End: 2023-05-01

## 2022-08-09 RX ORDER — AMOXICILLIN 500 MG/1
500 CAPSULE ORAL 2 TIMES DAILY
Qty: 20 CAPSULE | Refills: 0 | Status: SHIPPED | OUTPATIENT
Start: 2022-08-09 | End: 2022-08-19

## 2022-08-09 ASSESSMENT — FIBROSIS 4 INDEX: FIB4 SCORE: 0.44

## 2022-08-09 NOTE — LETTER
August 9, 2022    To Whom It May Concern:         This is confirmation that Abby Rosen attended her scheduled appointment with Peri Hernández D.N.P. on 8/09/22. She may return to work by 8/11/2022 if symptoms have subsided.         If you have any questions please do not hesitate to call me at the phone number listed below.    Sincerely,          ELENA CottonP.  [phone]

## 2022-08-09 NOTE — PROGRESS NOTES
Chief Complaint   Patient presents with    Nasal Congestion     Sinus pressure and pain x 3 days        HISTORY OF PRESENT ILLNESS: Abby Rosen is a 31 y.o. female established patient of Dr KOSTA Lin who presents today to discuss:  - nasal sinus pressure with congestion x2 days since 8/7/2022; yesterday throat became sore, has runny/itchy nose; denies fever, CP, SOB, cough, fatigue  - 5 year old son at home getting treatment for ear infection    Patient Active Problem List   Diagnosis    Migraine without aura and without status migrainosus, not intractable    Anxiety and depression    Insomnia disorder    Encounter for birth control    Neutropenia (HCC)    Hip pain, left    Environmental allergies    Fatigue    Chronic abdominal pain    Benign papule    Chronic migraine without aura without status migrainosus, not intractable    Pharyngitis with viral syndrome     Current Outpatient Medications on File Prior to Visit   Medication Sig Dispense Refill    amitriptyline (ELAVIL) 25 MG Tab       ondansetron (ZOFRAN) 4 MG Tab tablet TAKE 1 TABLET BY MOUTH EVERY 4 HOURS AS NEEDED FOR NAUSEA OR VOMITING 20 Tablet 0    ibuprofen (MOTRIN) 800 MG Tab Take 1 Tablet by mouth every 8 hours as needed for Moderate Pain or Inflammation. 30 Tablet 0    ethinyl estradiol-etonogestrel (NUVARING) 0.12-0.015 MG/24HR vaginal ring INSERT 1 RING VAGINALLY AS DIRECTED, REMOVE AFTER 3 WEEKS AND WAIT 7 DAYS BEFORE INSERTING A NEW RING 3 Each 2    diclofenac sodium (VOLTAREN) 1 % Gel APPLY 2 GRAM TOPICALLY TO THE ENTIRE AFFECTED AREAS UP TO 4 TIMES A DAY ONLY AS NEEDED FOR NECK PAIN      SUMAtriptan (IMITREX) 50 MG Tab 1 p.o. when migraine occurs may repeat in 2 hours if it persists no more than 2 tablets in 1 day 9 Tablet 1    ALPRAZolam (XANAX) 0.5 MG Tab alprazolam 0.5 mg tablet   TAKE 1 TABLET BY MOUTH AT BEDTIME AS NEEDED      fluticasone (FLONASE) 50 MCG/ACT nasal spray Administer 1 Spray into affected nostril(S) every day.       "polyethylene glycol 3350 (MIRALAX) 17 GM/SCOOP Powder Take 17 g by mouth every day. 225 g 3    omeprazole (PRILOSEC) 20 MG delayed-release capsule Take 1 Cap by mouth every day. 30 Cap 0    cyclobenzaprine (FLEXERIL) 10 MG Tab Take 1 Tab by mouth 3 times a day as needed. 30 Tab 0    multivitamin (THERAGRAN) Tab Take 1 Tab by mouth every day.      topiramate (TOPAMAX) 25 MG capsule TAKE 2 CAPSULES BY MOUTH AT BEDTIME 60 Capsule 5     No current facility-administered medications on file prior to visit.       has a past medical history of Anxiety and depression and Migraine.     Allergies:Patient has no known allergies.    Health Maintenance: deferred  Review of Systems -included above  Exam:   /68 (BP Location: Left arm, Patient Position: Sitting, BP Cuff Size: Adult)   Pulse 100   Temp 36.7 °C (98 °F) (Temporal)   Ht 1.575 m (5' 2\")   Wt 63.5 kg (140 lb)   SpO2 97%   Body mass index is 25.61 kg/m².   General:  Well nourished, well developed female in NAD, appropriate and cooperative with exam.  HEENT: NCAT, TM clear, reflective, with good landmarks bilaterally. Sclera and conjunctiva clear. Nares turbinates are pink & bulging. Oropharynx moist, intact dentition; tonsils are reddened & swollen, no lesions or exudate.  Neck: supple, No cervical or supraclavicular lymphadenopathy.  Lungs: Clear and equal with good air movement.  Normal effort. No rales, ronchi, or wheezing.  Cardiovascular: Regular rate and rhythm, S1, S2 without murmur. Pedal pulses 2+ bilaterally. No edema    Assessment/Plan:  1. Nasal congestion  Nasal congestion x2 days; rhinorrhea + sore throat x1 days; swollen tonsils on exam, engorged nasal turbinates; r/o bacterial/viral pharyngitis, COVID, allergic rhinitis, sinusitis.  COVID/STREP NEGATIVE in clinic today; will send out throat culture. considering noted tonsillitis, I also sent prescription for amoxicillin while we wait for culture results; may take daily antihistamine with " decongestant prn symptoms; tylenol prn throat pain; nasal irrigation with normal saline; stay hydrated, rest.    - POCT Rapid Strep A  - POCT SARS-COV Antigen YONY (Symptomatic only)     Follow up:  Return if symptoms worsen or fail to improve.    Educated in proper administration of medication(s) ordered today including safety, possible SE, risks, benefits, rationale and alternatives to therapy.       Please note that this dictation was created using voice recognition software. I have made every reasonable attempt to correct obvious errors, but I expect that there are errors of grammar and possibly content that I did not discover before finalizing the note.

## 2022-08-09 NOTE — PATIENT INSTRUCTIONS
You can start a daily antihistamine like claritine, allegra, or zyrtec for 1-2 weeks. You may also add a decongestant like pseudoephedrine as needed for congestion.  Please irrigate your nasal passages with normal saline rinse a few times each day.

## 2022-08-12 LAB
BACTERIA SPEC RESP CULT: NORMAL
SIGNIFICANT IND 70042: NORMAL
SITE SITE: NORMAL
SOURCE SOURCE: NORMAL

## 2022-10-25 ENCOUNTER — OFFICE VISIT (OUTPATIENT)
Dept: MEDICAL GROUP | Facility: PHYSICIAN GROUP | Age: 32
End: 2022-10-25
Payer: COMMERCIAL

## 2022-10-25 VITALS
OXYGEN SATURATION: 98 % | BODY MASS INDEX: 28.93 KG/M2 | RESPIRATION RATE: 16 BRPM | WEIGHT: 157.2 LBS | HEART RATE: 105 BPM | SYSTOLIC BLOOD PRESSURE: 110 MMHG | DIASTOLIC BLOOD PRESSURE: 66 MMHG | TEMPERATURE: 98.2 F | HEIGHT: 62 IN

## 2022-10-25 DIAGNOSIS — R63.5 WEIGHT GAIN: ICD-10-CM

## 2022-10-25 DIAGNOSIS — N92.6 ABNORMAL MENSES: ICD-10-CM

## 2022-10-25 DIAGNOSIS — G43.709 CHRONIC MIGRAINE WITHOUT AURA WITHOUT STATUS MIGRAINOSUS, NOT INTRACTABLE: ICD-10-CM

## 2022-10-25 PROCEDURE — 99214 OFFICE O/P EST MOD 30 MIN: CPT | Performed by: FAMILY MEDICINE

## 2022-10-25 RX ORDER — PANTOPRAZOLE SODIUM 40 MG/1
TABLET, DELAYED RELEASE ORAL
COMMUNITY

## 2022-10-25 RX ORDER — ONDANSETRON 4 MG/1
TABLET, FILM COATED ORAL
COMMUNITY
End: 2023-04-26 | Stop reason: SDUPTHER

## 2022-10-25 RX ORDER — PREDNISONE 20 MG/1
TABLET ORAL
COMMUNITY
End: 2022-10-25

## 2022-10-25 ASSESSMENT — FIBROSIS 4 INDEX: FIB4 SCORE: 0.44

## 2022-10-25 NOTE — PROGRESS NOTES
Subjective:     CC:   Chief Complaint   Patient presents with    Follow-Up       HPI:   Abby presents today due to the fact that she is gaining weight since of last saner.  Patient is seeing a spine doctor for issues with her back.  Patient is also seeing a neurologist patient states she has a issue with her pituitary also she is being treated for her migraines.  Her neurologist put her on amitriptyline patient states that she has had some issues with weight gain since she has been on it.  Patient is using NuvaRing for birth control she states last couple months her periods have been shorter than usual.  Patient denies having the NuvaRing removed too early or losing it she does not feel like she is pregnant.    Past Medical History:   Diagnosis Date    Anxiety and depression     Migraine        Social History     Tobacco Use    Smoking status: Never    Smokeless tobacco: Never   Vaping Use    Vaping Use: Never used   Substance Use Topics    Alcohol use: Yes     Alcohol/week: 2.4 oz     Types: 4 Glasses of wine per week     Comment: Occ    Drug use: No       Current Outpatient Medications Ordered in Epic   Medication Sig Dispense Refill    pantoprazole (PROTONIX) 40 MG Tablet Delayed Response pantoprazole 40 mg tablet,delayed release   PLEASE SEE ATTACHED FOR DETAILED DIRECTIONS      ondansetron (ZOFRAN) 4 MG Tab tablet ondansetron HCl 4 mg tablet   TAKE 1 TABLET BY MOUTH EVERY 4 HOURS AS NEEDED FOR NAUSEA OR VOMITING      amitriptyline (ELAVIL) 25 MG Tab       ibuprofen (MOTRIN) 800 MG Tab Take 1 Tablet by mouth every 8 hours as needed for Moderate Pain or Inflammation. 30 Tablet 0    ethinyl estradiol-etonogestrel (NUVARING) 0.12-0.015 MG/24HR vaginal ring INSERT 1 RING VAGINALLY AS DIRECTED, REMOVE AFTER 3 WEEKS AND WAIT 7 DAYS BEFORE INSERTING A NEW RING 3 Each 2    diclofenac sodium (VOLTAREN) 1 % Gel APPLY 2 GRAM TOPICALLY TO THE ENTIRE AFFECTED AREAS UP TO 4 TIMES A DAY ONLY AS NEEDED FOR NECK PAIN       "SUMAtriptan (IMITREX) 50 MG Tab 1 p.o. when migraine occurs may repeat in 2 hours if it persists no more than 2 tablets in 1 day 9 Tablet 1    fluticasone (FLONASE) 50 MCG/ACT nasal spray Administer 1 Spray into affected nostril(S) every day.      polyethylene glycol 3350 (MIRALAX) 17 GM/SCOOP Powder Take 17 g by mouth every day. 225 g 3    cyclobenzaprine (FLEXERIL) 10 MG Tab Take 1 Tab by mouth 3 times a day as needed. 30 Tab 0    multivitamin (THERAGRAN) Tab Take 1 Tab by mouth every day.       No current Epic-ordered facility-administered medications on file.       Allergies:  Patient has no known allergies.    Health Maintenance: Completed    ROS:  Gen: no fevers/chills, patient has gained 17 pounds in 2 months  Eyes: no changes in vision  ENT: no sore throat, no hearing loss, no bloody nose  Pulm: no sob, no cough  CV: no chest pain, no palpitations  GI: no nausea/vomiting, no diarrhea, denies constipation  : no dysuria  Neuro: no headaches, no numbness/tingling  Heme/Lymph: no easy bruising    Objective:     Exam:  /66 (BP Location: Left arm, Patient Position: Sitting, BP Cuff Size: Adult)   Pulse (!) 105   Temp 36.8 °C (98.2 °F) (Temporal)   Resp 16   Ht 1.575 m (5' 2\")   Wt 71.3 kg (157 lb 3.2 oz)   LMP 10/10/2022   SpO2 98%   BMI 28.75 kg/m²  Body mass index is 28.75 kg/m².    Gen: Alert and oriented, No apparent distress.  Skin: Warm and dry.  No obvious lesions.  Eyes: Sclera wnl Pupils normal in size  Lungs: Normal effort, CTA bilaterally, no wheezes, rhonchi, or rales  CV: Regular rate and rhythm. No murmurs, rubs, or gallops.  ABD: Soft non-tender no organomegaly  Musculoskeletal: Normal gait. No extremity cyanosis, clubbing, or edema.  Neuro: Oriented to person, place and time  Psych: Mood is wnl       Labs: Nonfasting labs were ordered    Assessment & Plan:     31 y.o. female with the following -     1. Weight gain  We will go ahead and order this lab work recommend I see her back in " 2 weeks this is acute problem  - CBC WITH DIFFERENTIAL; Future  - Comp Metabolic Panel; Future  - TSH; Future  - TRIIDOTHYRONINE; Future    2. Abnormal menses  I recommend getting a pregnancy test done this is acute problem  - HCG QUAL SERUM; Future    3. Chronic migraine without aura without status migrainosus, not intractable  I will have my MA have patient's sign release to request her records from her neurologist this is a chronic problem    Return in about 2 weeks (around 11/8/2022).    Please note that this dictation was created using voice recognition software. I have made every reasonable attempt to correct obvious errors, but I expect that there are errors of grammar and possibly content that I did not discover before finalizing the note.

## 2022-10-27 ENCOUNTER — HOSPITAL ENCOUNTER (OUTPATIENT)
Dept: LAB | Facility: MEDICAL CENTER | Age: 32
End: 2022-10-27
Attending: FAMILY MEDICINE
Payer: COMMERCIAL

## 2022-10-27 DIAGNOSIS — N92.6 ABNORMAL MENSES: ICD-10-CM

## 2022-10-27 DIAGNOSIS — R63.5 WEIGHT GAIN: ICD-10-CM

## 2022-10-27 LAB
ALBUMIN SERPL BCP-MCNC: 4.1 G/DL (ref 3.2–4.9)
ALBUMIN/GLOB SERPL: 1.3 G/DL
ALP SERPL-CCNC: 53 U/L (ref 30–99)
ALT SERPL-CCNC: 20 U/L (ref 2–50)
ANION GAP SERPL CALC-SCNC: 9 MMOL/L (ref 7–16)
AST SERPL-CCNC: 20 U/L (ref 12–45)
BASOPHILS # BLD AUTO: 0.2 % (ref 0–1.8)
BASOPHILS # BLD: 0.01 K/UL (ref 0–0.12)
BILIRUB SERPL-MCNC: 0.3 MG/DL (ref 0.1–1.5)
BUN SERPL-MCNC: 9 MG/DL (ref 8–22)
CALCIUM SERPL-MCNC: 9 MG/DL (ref 8.5–10.5)
CHLORIDE SERPL-SCNC: 104 MMOL/L (ref 96–112)
CO2 SERPL-SCNC: 24 MMOL/L (ref 20–33)
CREAT SERPL-MCNC: 0.84 MG/DL (ref 0.5–1.4)
EOSINOPHIL # BLD AUTO: 0.09 K/UL (ref 0–0.51)
EOSINOPHIL NFR BLD: 1.5 % (ref 0–6.9)
ERYTHROCYTE [DISTWIDTH] IN BLOOD BY AUTOMATED COUNT: 42.2 FL (ref 35.9–50)
GFR SERPLBLD CREATININE-BSD FMLA CKD-EPI: 94 ML/MIN/1.73 M 2
GLOBULIN SER CALC-MCNC: 3.1 G/DL (ref 1.9–3.5)
GLUCOSE SERPL-MCNC: 102 MG/DL (ref 65–99)
HCG SERPL QL: NEGATIVE
HCT VFR BLD AUTO: 42.4 % (ref 37–47)
HGB BLD-MCNC: 14 G/DL (ref 12–16)
IMM GRANULOCYTES # BLD AUTO: 0.03 K/UL (ref 0–0.11)
IMM GRANULOCYTES NFR BLD AUTO: 0.5 % (ref 0–0.9)
LYMPHOCYTES # BLD AUTO: 2.42 K/UL (ref 1–4.8)
LYMPHOCYTES NFR BLD: 40.5 % (ref 22–41)
MCH RBC QN AUTO: 29.6 PG (ref 27–33)
MCHC RBC AUTO-ENTMCNC: 33 G/DL (ref 33.6–35)
MCV RBC AUTO: 89.6 FL (ref 81.4–97.8)
MONOCYTES # BLD AUTO: 0.79 K/UL (ref 0–0.85)
MONOCYTES NFR BLD AUTO: 13.2 % (ref 0–13.4)
NEUTROPHILS # BLD AUTO: 2.63 K/UL (ref 2–7.15)
NEUTROPHILS NFR BLD: 44.1 % (ref 44–72)
NRBC # BLD AUTO: 0 K/UL
NRBC BLD-RTO: 0 /100 WBC
PLATELET # BLD AUTO: 304 K/UL (ref 164–446)
PMV BLD AUTO: 10 FL (ref 9–12.9)
POTASSIUM SERPL-SCNC: 3.7 MMOL/L (ref 3.6–5.5)
PROT SERPL-MCNC: 7.2 G/DL (ref 6–8.2)
RBC # BLD AUTO: 4.73 M/UL (ref 4.2–5.4)
SODIUM SERPL-SCNC: 137 MMOL/L (ref 135–145)
T3 SERPL-MCNC: 172 NG/DL (ref 60–181)
TSH SERPL DL<=0.005 MIU/L-ACNC: 1.01 UIU/ML (ref 0.38–5.33)
WBC # BLD AUTO: 6 K/UL (ref 4.8–10.8)

## 2022-10-27 PROCEDURE — 36415 COLL VENOUS BLD VENIPUNCTURE: CPT

## 2022-10-27 PROCEDURE — 85025 COMPLETE CBC W/AUTO DIFF WBC: CPT

## 2022-10-27 PROCEDURE — 84443 ASSAY THYROID STIM HORMONE: CPT

## 2022-10-27 PROCEDURE — 84480 ASSAY TRIIODOTHYRONINE (T3): CPT

## 2022-10-27 PROCEDURE — 84703 CHORIONIC GONADOTROPIN ASSAY: CPT

## 2022-10-27 PROCEDURE — 80053 COMPREHEN METABOLIC PANEL: CPT

## 2022-11-07 ENCOUNTER — OFFICE VISIT (OUTPATIENT)
Dept: MEDICAL GROUP | Facility: PHYSICIAN GROUP | Age: 32
End: 2022-11-07
Payer: COMMERCIAL

## 2022-11-07 VITALS
HEIGHT: 62 IN | SYSTOLIC BLOOD PRESSURE: 112 MMHG | DIASTOLIC BLOOD PRESSURE: 76 MMHG | RESPIRATION RATE: 16 BRPM | TEMPERATURE: 98.4 F | WEIGHT: 160.2 LBS | HEART RATE: 97 BPM | OXYGEN SATURATION: 98 % | BODY MASS INDEX: 29.48 KG/M2

## 2022-11-07 DIAGNOSIS — R63.5 WEIGHT GAIN: ICD-10-CM

## 2022-11-07 DIAGNOSIS — G43.009 MIGRAINE WITHOUT AURA AND WITHOUT STATUS MIGRAINOSUS, NOT INTRACTABLE: ICD-10-CM

## 2022-11-07 PROCEDURE — 99213 OFFICE O/P EST LOW 20 MIN: CPT | Performed by: FAMILY MEDICINE

## 2022-11-07 ASSESSMENT — FIBROSIS 4 INDEX: FIB4 SCORE: 0.47

## 2022-11-07 NOTE — PROGRESS NOTES
Subjective:     CC:   Chief Complaint   Patient presents with    Follow-Up       HPI:   Abby presents today for follow-up.  Patient did see neurology and they did put her on amitriptyline which she feels is helping her with her headaches.  Patient has been using the NuvaRing for a number years with no problems or issues.  Patient denies any nipple discharge or abnormal periods at this time.    Past Medical History:   Diagnosis Date    Anxiety and depression     Migraine        Social History     Tobacco Use    Smoking status: Never    Smokeless tobacco: Never   Vaping Use    Vaping Use: Never used   Substance Use Topics    Alcohol use: Yes     Alcohol/week: 2.4 oz     Types: 4 Glasses of wine per week     Comment: Occ    Drug use: No       Current Outpatient Medications Ordered in Epic   Medication Sig Dispense Refill    pantoprazole (PROTONIX) 40 MG Tablet Delayed Response pantoprazole 40 mg tablet,delayed release   PLEASE SEE ATTACHED FOR DETAILED DIRECTIONS      ondansetron (ZOFRAN) 4 MG Tab tablet ondansetron HCl 4 mg tablet   TAKE 1 TABLET BY MOUTH EVERY 4 HOURS AS NEEDED FOR NAUSEA OR VOMITING      amitriptyline (ELAVIL) 25 MG Tab       ibuprofen (MOTRIN) 800 MG Tab Take 1 Tablet by mouth every 8 hours as needed for Moderate Pain or Inflammation. 30 Tablet 0    ethinyl estradiol-etonogestrel (NUVARING) 0.12-0.015 MG/24HR vaginal ring INSERT 1 RING VAGINALLY AS DIRECTED, REMOVE AFTER 3 WEEKS AND WAIT 7 DAYS BEFORE INSERTING A NEW RING 3 Each 2    diclofenac sodium (VOLTAREN) 1 % Gel APPLY 2 GRAM TOPICALLY TO THE ENTIRE AFFECTED AREAS UP TO 4 TIMES A DAY ONLY AS NEEDED FOR NECK PAIN      SUMAtriptan (IMITREX) 50 MG Tab 1 p.o. when migraine occurs may repeat in 2 hours if it persists no more than 2 tablets in 1 day 9 Tablet 1    fluticasone (FLONASE) 50 MCG/ACT nasal spray Administer 1 Spray into affected nostril(S) every day.      polyethylene glycol 3350 (MIRALAX) 17 GM/SCOOP Powder Take 17 g by mouth every  "day. 225 g 3    cyclobenzaprine (FLEXERIL) 10 MG Tab Take 1 Tab by mouth 3 times a day as needed. 30 Tab 0    multivitamin (THERAGRAN) Tab Take 1 Tab by mouth every day.       No current Epic-ordered facility-administered medications on file.       Allergies:  Patient has no known allergies.    Health Maintenance: Completed    ROS:  Gen: no fevers/chills  Eyes: no changes in vision  ENT: no sore throat, no hearing loss, no bloody nose  Pulm: no sob, no cough  CV: no chest pain, no palpitations  GI: no nausea/vomiting, no diarrhea  Neuro: no headaches, no numbness/tingling  Heme/Lymph: no easy bruising    Objective:     Exam:  /76 (BP Location: Right arm, Patient Position: Sitting, BP Cuff Size: Adult)   Pulse 97   Temp 36.9 °C (98.4 °F) (Temporal)   Resp 16   Ht 1.575 m (5' 2\")   Wt 72.7 kg (160 lb 3.2 oz)   LMP 10/10/2022   SpO2 98%   BMI 29.30 kg/m²  Body mass index is 29.3 kg/m².    Gen: Alert and oriented, No apparent distress.  Skin: Warm and dry.  No obvious lesions.  Eyes: Sclera wnl Pupils normal in size  Lungs: Normal effort, CTA bilaterally, no wheezes, rhonchi, or rales  CV: Regular rate and rhythm. No murmurs, rubs, or gallops.  Musculoskeletal: Normal gait. No extremity cyanosis, clubbing, or edema.  Neuro: Oriented to person, place and time  Psych: Mood is wnl         Assessment & Plan:     31 y.o. female with the following -     1. Weight gain  At this point her labs appear to be within normal limits recommend possibility of a nutritional consult.  Also recommend she can start doing some calorie counting and also documentation of what types of exercise she is doing.  I can always see her back in 1 month for follow-up.  At this point patient wants to wait and see how the consultation does.  This is acute problem  - Referral to Nutrition Services    2. Migraine without aura and without status migrainosus, not intractable  Patient did see neurology but she started gaining weight after her " visit with them patient's wondering about pituitary issues I would recommend that she go back to the neurologist again to discuss this issue.  This is a chronic problem      Return in about 4 weeks (around 12/5/2022), or if symptoms worsen or fail to improve.    Please note that this dictation was created using voice recognition software. I have made every reasonable attempt to correct obvious errors, but I expect that there are errors of grammar and possibly content that I did not discover before finalizing the note.

## 2022-12-06 ENCOUNTER — HOSPITAL ENCOUNTER (OUTPATIENT)
Facility: MEDICAL CENTER | Age: 32
End: 2022-12-06
Attending: OBSTETRICS & GYNECOLOGY
Payer: COMMERCIAL

## 2022-12-06 ENCOUNTER — GYNECOLOGY VISIT (OUTPATIENT)
Dept: OBGYN | Facility: CLINIC | Age: 32
End: 2022-12-06
Payer: COMMERCIAL

## 2022-12-06 VITALS — SYSTOLIC BLOOD PRESSURE: 127 MMHG | DIASTOLIC BLOOD PRESSURE: 78 MMHG | WEIGHT: 168 LBS | BODY MASS INDEX: 30.73 KG/M2

## 2022-12-06 DIAGNOSIS — N91.2 ABSENCE OF MENSES DUE TO USE OF CONTRACEPTIVE: ICD-10-CM

## 2022-12-06 DIAGNOSIS — R10.2 PELVIC PAIN: ICD-10-CM

## 2022-12-06 DIAGNOSIS — Z01.419 WELL WOMAN EXAM WITH ROUTINE GYNECOLOGICAL EXAM: ICD-10-CM

## 2022-12-06 PROCEDURE — 87491 CHLMYD TRACH DNA AMP PROBE: CPT

## 2022-12-06 PROCEDURE — 88175 CYTOPATH C/V AUTO FLUID REDO: CPT

## 2022-12-06 PROCEDURE — 87591 N.GONORRHOEAE DNA AMP PROB: CPT

## 2022-12-06 PROCEDURE — 87624 HPV HI-RISK TYP POOLED RSLT: CPT

## 2022-12-06 PROCEDURE — 99215 OFFICE O/P EST HI 40 MIN: CPT | Performed by: OBSTETRICS & GYNECOLOGY

## 2022-12-06 RX ORDER — ACETAMINOPHEN AND CODEINE PHOSPHATE 120; 12 MG/5ML; MG/5ML
1 SOLUTION ORAL EVERY 24 HOURS
Qty: 30 TABLET | Refills: 11 | Status: SHIPPED | OUTPATIENT
Start: 2022-12-06 | End: 2023-05-01

## 2022-12-06 ASSESSMENT — FIBROSIS 4 INDEX: FIB4 SCORE: 0.49

## 2022-12-06 NOTE — PROGRESS NOTES
Gynecology Annual    Abby Rosne    32 y.o.    Chief complaint    Chief Complaint   Patient presents with    Gynecologic Exam     New Patient        HPI    Patient is a pleasant 31 yo  who presents for gyn annual exam and several concerns.   She has noticed for the past several months significant weight gain about 40 lbs steadily over last few months despite diet, exercise and evaluation by her PCP. She also reports high appetite, breast tenderness and mood changes. Has been on Nuva ring for contraception for the past decade and overall quite happy with method. She does report for the past two months having no menses during her ring free 'period' weeks. Has taken multiple pregnancy tests over last few weeks which were negative.   She has started Amitriptyline prescribed by her PCP for treatment of migraines, but her PCP does not think it is the etiology of her weight gain and above symptoms.   She also has had pelvic cramping daily since the past month.   No abnormal discharge.   Sexually active with male partner, long term. Feels safe. Urinary and bowel movement increased frequency lately.       Review of Systems:  Review of Systems   All other systems reviewed and are negative.     Past Obstetrical History:    SAB x 1  LTCS x 1    Past Gynecological History:    Last pap: Unsure, > 5 years ago    H/o abnormal pap: No    H/o STIs: No    DEXA: N/A    Last Mammogram: N/A    LMP: Patient's last menstrual period was 10/10/2022 (approximate).    BCM: Nuva Ring    Past Medical History    Past Medical History:   Diagnosis Date    Anxiety and depression     Migraine        Past Surgical History    Past Surgical History:   Procedure Laterality Date    PRIMARY C SECTION  2019       Family History   Problem Relation Age of Onset    Hypertension Mother     Cancer Father     Cancer Maternal Aunt         Breast cancer    Cancer Maternal Grandmother         Breast cancer    Arthritis Maternal Grandmother      Cancer Paternal Grandfather         Prostate CA    Lung Disease Maternal Grandfather     Lung Disease Son        Allergies    No Known Allergies    Medications    Current Outpatient Medications   Medication Sig Dispense Refill    pantoprazole (PROTONIX) 40 MG Tablet Delayed Response pantoprazole 40 mg tablet,delayed release   PLEASE SEE ATTACHED FOR DETAILED DIRECTIONS      ondansetron (ZOFRAN) 4 MG Tab tablet ondansetron HCl 4 mg tablet   TAKE 1 TABLET BY MOUTH EVERY 4 HOURS AS NEEDED FOR NAUSEA OR VOMITING      amitriptyline (ELAVIL) 25 MG Tab       ibuprofen (MOTRIN) 800 MG Tab Take 1 Tablet by mouth every 8 hours as needed for Moderate Pain or Inflammation. 30 Tablet 0    ethinyl estradiol-etonogestrel (NUVARING) 0.12-0.015 MG/24HR vaginal ring INSERT 1 RING VAGINALLY AS DIRECTED, REMOVE AFTER 3 WEEKS AND WAIT 7 DAYS BEFORE INSERTING A NEW RING 3 Each 2    diclofenac sodium (VOLTAREN) 1 % Gel APPLY 2 GRAM TOPICALLY TO THE ENTIRE AFFECTED AREAS UP TO 4 TIMES A DAY ONLY AS NEEDED FOR NECK PAIN      SUMAtriptan (IMITREX) 50 MG Tab 1 p.o. when migraine occurs may repeat in 2 hours if it persists no more than 2 tablets in 1 day 9 Tablet 1    fluticasone (FLONASE) 50 MCG/ACT nasal spray Administer 1 Spray into affected nostril(S) every day.      polyethylene glycol 3350 (MIRALAX) 17 GM/SCOOP Powder Take 17 g by mouth every day. 225 g 3    cyclobenzaprine (FLEXERIL) 10 MG Tab Take 1 Tab by mouth 3 times a day as needed. 30 Tab 0    multivitamin (THERAGRAN) Tab Take 1 Tab by mouth every day.       No current facility-administered medications for this visit.       Social    Social History     Tobacco Use    Smoking status: Never    Smokeless tobacco: Never   Vaping Use    Vaping Use: Never used   Substance Use Topics    Alcohol use: Yes     Alcohol/week: 2.4 oz     Types: 4 Glasses of wine per week     Comment: Occ    Drug use: No        OBJECTIVE:    Vitals    /78   Wt 168 lb   LMP 10/10/2022 (Approximate)    BMI 30.73 kg/m²     Physical Exam    GENERAL: Well developed, well nourished, female in no acute distress.    HEENT: NCAT, mucus membranes moist    Neck: Supple, nontender, no KAT, no thyromegaly    Breasts: Symmetric, Nontender, no masses, no nipple discharge, no skin changes    CV: RRR    Pulm: CTAB    Abdomen: Soft ND NT.    Ext: ALVARADO    : Normal Vulva, vagina. No lesions present. No abnormal discharge. No blood.    Cervix smooth pink no lesions, abnormal discharge or blood. Pap, screening GC/Ct collected.     Uterus small midline AV mobile nontender    Adnexa no masses or tenderness    Labs/Pathology:    None    A/P    Patient Active Problem List   Diagnosis    Migraine without aura and without status migrainosus, not intractable    Anxiety and depression    Insomnia disorder    Encounter for birth control    Neutropenia (HCC)    Hip pain, left    Environmental allergies    Fatigue    Chronic abdominal pain    Benign papule    Chronic migraine without aura without status migrainosus, not intractable    Pharyngitis with viral syndrome    Weight gain    Abnormal menses       Abby Rosen    32 y.o.        1. Well woman exam with routine gynecological exam - follow up pap, screening GC/CT. Continue yearly pelvic and breast exam - monthly self breast exam encouraged. Follow up with PCP for routine health maintenance/exams.      2. Pelvic pain - patient concerned about having concerning findings internally.' Reassured that her pelvic exam was normal, but imaging offered, patient desires this. Pelvic US ordered.      3. Absence of menses due to use of contraceptive - discussed this may be normal side effect of Nuva ring, but this may be also contributing to weight gain, breast tenderness, mood changes. Offered switching to estrogen-free contraception including oral progestins, levonorgestrel IUDs, etonorgestrel implant. Risks, benefits, side effects of each reviewed. She desires trial of oral progestin.   Rx norethindrone 0.35 mg daily ordered.        RTC in 3 months for follow up.     Total time spent is 60 minutes, outside of the well woman exam.      Gabbie Wong M.D.    Obstetrics and Gynecology    12/6/202210:43 AM

## 2022-12-07 DIAGNOSIS — Z01.419 WELL WOMAN EXAM WITH ROUTINE GYNECOLOGICAL EXAM: ICD-10-CM

## 2022-12-07 LAB
C TRACH DNA GENITAL QL NAA+PROBE: NEGATIVE
N GONORRHOEA DNA GENITAL QL NAA+PROBE: NEGATIVE
SPECIMEN SOURCE: NORMAL

## 2022-12-08 LAB
CYTOLOGY REG CYTOL: ABNORMAL
HPV HR 12 DNA CVX QL NAA+PROBE: POSITIVE
HPV16 DNA SPEC QL NAA+PROBE: NEGATIVE
HPV18 DNA SPEC QL NAA+PROBE: NEGATIVE
SPECIMEN SOURCE: ABNORMAL

## 2023-01-18 ENCOUNTER — HOSPITAL ENCOUNTER (OUTPATIENT)
Dept: RADIOLOGY | Facility: MEDICAL CENTER | Age: 33
End: 2023-01-18
Attending: OBSTETRICS & GYNECOLOGY
Payer: COMMERCIAL

## 2023-01-18 DIAGNOSIS — R10.2 PELVIC PAIN: ICD-10-CM

## 2023-01-18 PROCEDURE — 76830 TRANSVAGINAL US NON-OB: CPT

## 2023-04-26 ENCOUNTER — OFFICE VISIT (OUTPATIENT)
Dept: MEDICAL GROUP | Facility: IMAGING CENTER | Age: 33
End: 2023-04-26
Payer: COMMERCIAL

## 2023-04-26 VITALS
BODY MASS INDEX: 30.51 KG/M2 | SYSTOLIC BLOOD PRESSURE: 114 MMHG | OXYGEN SATURATION: 98 % | HEART RATE: 77 BPM | TEMPERATURE: 98.1 F | RESPIRATION RATE: 17 BRPM | DIASTOLIC BLOOD PRESSURE: 80 MMHG | WEIGHT: 165.8 LBS | HEIGHT: 62 IN

## 2023-04-26 DIAGNOSIS — M54.2 NECK PAIN: ICD-10-CM

## 2023-04-26 DIAGNOSIS — F43.9 STRESS: ICD-10-CM

## 2023-04-26 DIAGNOSIS — M54.9 UPPER BACK PAIN: ICD-10-CM

## 2023-04-26 DIAGNOSIS — M62.838 MUSCLE SPASM: ICD-10-CM

## 2023-04-26 DIAGNOSIS — R11.0 NAUSEA: ICD-10-CM

## 2023-04-26 DIAGNOSIS — G43.009 MIGRAINE WITHOUT AURA AND WITHOUT STATUS MIGRAINOSUS, NOT INTRACTABLE: ICD-10-CM

## 2023-04-26 DIAGNOSIS — F41.9 ANXIETY: ICD-10-CM

## 2023-04-26 PROCEDURE — 99214 OFFICE O/P EST MOD 30 MIN: CPT | Performed by: FAMILY MEDICINE

## 2023-04-26 RX ORDER — ONDANSETRON 4 MG/1
TABLET, FILM COATED ORAL
Qty: 20 TABLET | Refills: 0 | Status: SHIPPED | OUTPATIENT
Start: 2023-04-26 | End: 2023-06-23 | Stop reason: SDUPTHER

## 2023-04-26 RX ORDER — ACETAMINOPHEN AND CODEINE PHOSPHATE 120; 12 MG/5ML; MG/5ML
1 SOLUTION ORAL DAILY
COMMUNITY
End: 2023-09-06 | Stop reason: SDUPTHER

## 2023-04-26 RX ORDER — CYCLOBENZAPRINE HCL 10 MG
10 TABLET ORAL 3 TIMES DAILY PRN
Qty: 30 TABLET | Refills: 0 | Status: SHIPPED | OUTPATIENT
Start: 2023-04-26 | End: 2023-06-23 | Stop reason: SDUPTHER

## 2023-04-26 ASSESSMENT — PATIENT HEALTH QUESTIONNAIRE - PHQ9
SUM OF ALL RESPONSES TO PHQ QUESTIONS 1-9: 10
CLINICAL INTERPRETATION OF PHQ2 SCORE: 1
5. POOR APPETITE OR OVEREATING: 0 - NOT AT ALL

## 2023-04-26 ASSESSMENT — ANXIETY QUESTIONNAIRES
1. FEELING NERVOUS, ANXIOUS, OR ON EDGE: NEARLY EVERY DAY
5. BEING SO RESTLESS THAT IT IS HARD TO SIT STILL: NEARLY EVERY DAY
4. TROUBLE RELAXING: NEARLY EVERY DAY
6. BECOMING EASILY ANNOYED OR IRRITABLE: NEARLY EVERY DAY
7. FEELING AFRAID AS IF SOMETHING AWFUL MIGHT HAPPEN: NOT AT ALL
2. NOT BEING ABLE TO STOP OR CONTROL WORRYING: NEARLY EVERY DAY
GAD7 TOTAL SCORE: 18
3. WORRYING TOO MUCH ABOUT DIFFERENT THINGS: NEARLY EVERY DAY

## 2023-04-26 ASSESSMENT — PAIN SCALES - GENERAL: PAINLEVEL: NO PAIN

## 2023-04-26 ASSESSMENT — FIBROSIS 4 INDEX: FIB4 SCORE: 0.49

## 2023-04-26 NOTE — PROGRESS NOTES
Chief Complaint   Patient presents with    Establish Care     Prior PCP- Doreen Lin     Anxiety       HPI:  32 y.o. female new patient here to review medical issues and establish care.   Previously with Doreen Lin MD.    Has been using Flexeril about once a week for neck/back pain.  Sits at a desk  all day, she works as a .  5-15 min work out. Without recent injury.     Anxiety issues.  Currently stressors as her son's health issues, work, etc. Feels more frequent.  Feels she needs to move or do something.  Said she was on Xanax intermittently in the past.    Anxiety more so than depressive symptoms.  No SI/HI.    Migraines-amitriptyline 12.5 mg nightly.  She uses Zofran for nausea as needed.  Needs refill medication for Zofran.      Lifestyle:  Stressors: as above.   Social Support:  toddler son-autism, 5 yo- nonverbal, eats certain foods, started having seizures, OM, chiari malformation.   Mom in area, sister in area.   Work: working from home    Health Maintenance Due   Topic Date Due    IMM DTaP/Tdap/Td Vaccine (7 - Td or Tdap) 01/25/2017       Immunization History   Administered Date(s) Administered    DTP - Historical vaccine 01/31/1990, 04/06/1990, 06/06/1990, 11/21/1991, 09/01/1994    HPV Quadrivalent Vaccine (GARDASIL) - HISTORICAL DATA 01/25/2007, 04/05/2007, 08/24/2007    Hepatitis A Vaccine, Adult 11/16/2000    Hepatitis A Vaccine, Ped/Adol 01/25/2007    Hepatitis B Vaccine Adolescent/Pediatric 04/21/1993, 05/26/1993, 01/26/1994    Influenza Vaccine Quad Inj (Pf) 10/30/2017    MMR Vaccine 09/04/1991, 09/01/1994    Meningococcal Conjugate Vaccine MCV4 (Menactra) 08/24/2007    OPV TRIVALENT - HISTORICAL DATA (GIVEN PRIOR TO MAY 2016) 01/31/1990, 04/06/1990, 09/04/1991, 09/01/1994    PFIZER BIVALENT BOOSTER SARS-COV-2 VACCINE (12+) 11/21/2022    PFIZER PURPLE CAP SARS-COV-2 VACCINATION (12+) 08/07/2021, 08/28/2021    TD Vaccine 11/16/2000    Tdap Vaccine 01/25/2007         Review of  Systems   Constitutional: Negative for fever, chills and malaise/fatigue.   HENT: Negative for congestion, sore throat, or swallowing issues.   Eyes: Negative for pain or vision changes.   Respiratory: Negative for cough and shortness of breath.    Cardiovascular: Negative for leg swelling. No chest pain.   Gastrointestinal: Negative for nausea, vomiting, abdominal pain and diarrhea.   Genitourinary: Negative for dysuria and hematuria.   Skin: Negative for rash.   Neurological: Negative for dizziness, focal weakness and headaches.   Endo/Heme/Allergies: Does not bruise/bleed easily.   Psychiatric/Behavioral:  As above.      Current Outpatient Medications:     norethindrone (VINICIO) 0.35 MG tablet, Take 1 Tablet by mouth every day., Disp: , Rfl:     pantoprazole (PROTONIX) 40 MG Tablet Delayed Response, pantoprazole 40 mg tablet,delayed release  PLEASE SEE ATTACHED FOR DETAILED DIRECTIONS, Disp: , Rfl:     ondansetron (ZOFRAN) 4 MG Tab tablet, ondansetron HCl 4 mg tablet  TAKE 1 TABLET BY MOUTH EVERY 4 HOURS AS NEEDED FOR NAUSEA OR VOMITING, Disp: , Rfl:     amitriptyline (ELAVIL) 25 MG Tab, , Disp: , Rfl:     ibuprofen (MOTRIN) 800 MG Tab, Take 1 Tablet by mouth every 8 hours as needed for Moderate Pain or Inflammation., Disp: 30 Tablet, Rfl: 0    diclofenac sodium (VOLTAREN) 1 % Gel, APPLY 2 GRAM TOPICALLY TO THE ENTIRE AFFECTED AREAS UP TO 4 TIMES A DAY ONLY AS NEEDED FOR NECK PAIN, Disp: , Rfl:     SUMAtriptan (IMITREX) 50 MG Tab, 1 p.o. when migraine occurs may repeat in 2 hours if it persists no more than 2 tablets in 1 day, Disp: 9 Tablet, Rfl: 1    fluticasone (FLONASE) 50 MCG/ACT nasal spray, Administer 1 Spray into affected nostril(S) every day., Disp: , Rfl:     polyethylene glycol 3350 (MIRALAX) 17 GM/SCOOP Powder, Take 17 g by mouth every day., Disp: 225 g, Rfl: 3    cyclobenzaprine (FLEXERIL) 10 MG Tab, Take 1 Tab by mouth 3 times a day as needed., Disp: 30 Tab, Rfl: 0    multivitamin (THERAGRAN) Tab,  "Take 1 Tab by mouth every day., Disp: , Rfl:     norethindrone (MICRONOR) 0.35 MG tablet, Take 1 Tablet by mouth every 24 hours. (Patient not taking: Reported on 4/26/2023), Disp: 30 Tablet, Rfl: 11    No Known Allergies    Patient Active Problem List   Diagnosis    Migraine without aura and without status migrainosus, not intractable    Anxiety and depression    Insomnia disorder    Encounter for birth control    Neutropenia (HCC)    Hip pain, left    Environmental allergies    Fatigue    Chronic abdominal pain    Benign papule    Chronic migraine without aura without status migrainosus, not intractable    Pharyngitis with viral syndrome    Weight gain    Abnormal menses       Past Medical History:   Diagnosis Date    Anxiety and depression     Migraine        Past Surgical History:   Procedure Laterality Date    PRIMARY C SECTION  01/31/2019       Family History   Problem Relation Age of Onset    Hypertension Mother     Cancer Father     Cancer Maternal Aunt         Breast cancer    Cancer Maternal Grandmother         Breast cancer    Arthritis Maternal Grandmother     Cancer Paternal Grandfather         Prostate CA    Lung Disease Maternal Grandfather     Lung Disease Son        Social History     Tobacco Use    Smoking status: Never    Smokeless tobacco: Never   Vaping Use    Vaping Use: Never used   Substance Use Topics    Alcohol use: Yes     Alcohol/week: 2.4 oz     Types: 4 Glasses of wine per week     Comment: Occ    Drug use: No         PHYSICAL EXAM:  /80 (BP Location: Left arm, Patient Position: Sitting, BP Cuff Size: Adult)   Pulse 77   Temp 36.7 °C (98.1 °F) (Temporal)   Resp 17   Ht 1.575 m (5' 2\")   Wt 75.2 kg (165 lb 12.8 oz)   LMP  (LMP Unknown) Comment: Last menses in Oct  SpO2 98%   BMI 30.33 kg/m²   General: well developed, well nourished female, no acute distress  HEENT: eye clear  Neck: supple, no lymphadenopathy, no thyromegaly, no spinal TTP, full ROM  Cardiovascular: regular " rate and rhythm, no murmurs, gallops, rubs  Lungs: clear to auscultation bilaterally, no wheezes, crackles, or ronchi  Abdomen: +bowel sounds, soft, nontender, nondistended, no rebound, no guarding, no hepatosplenomegaly  Extremities: no cyanosis, clubbing, edema  Skin: Warm and dry  Psych: appropriate mood and affect      ASSESSMENT/PLAN:    This is a 32 y.o. female new patient.     1. Muscle spasm  Recommend appropriate ergonomics at work station.   Recommend routine conditioning exercises. Modify activities. Reviewed precautions and potential side effects of medication therapy.   Monitor.    cyclobenzaprine (FLEXERIL) 10 mg Tab      2. Upper back pain - as above.  cyclobenzaprine (FLEXERIL) 10 mg Tab      3. Neck pain - as above.  cyclobenzaprine (FLEXERIL) 10 mg Tab      4. Anxiety   Counseled on management of stressors and anxiety.   Recommend increase Elavil from 12.5 mg to 25 mg as tolerated. Reviewed precautions and potential side effects of medication therapy.   Consider counseling therapy.  Recommend routine self-care activities.  Recommend healthy diet and routine exercise.  Monitor.       5. Stress   Counseled on management of stressors.        6. Migraine without aura and without status migrainosus, not intractable -stable, continue on elavil therapy.        7. Nausea - with migraine, medication as needed.  ondansetron (ZOFRAN) 4 MG Tab tablet          Return in about 6 weeks (around 6/7/2023).      This medical record contains text that has been entered with the assistance of computer voice recognition and dictation software.  Therefore, it may contain unintended errors in text, spelling, punctuation, or grammar.

## 2023-05-01 ENCOUNTER — SUPERVISING PHYSICIAN REVIEW (OUTPATIENT)
Dept: MEDICAL GROUP | Facility: IMAGING CENTER | Age: 33
End: 2023-05-01

## 2023-05-01 ENCOUNTER — TELEMEDICINE (OUTPATIENT)
Dept: MEDICAL GROUP | Facility: IMAGING CENTER | Age: 33
End: 2023-05-01
Payer: COMMERCIAL

## 2023-05-01 VITALS — BODY MASS INDEX: 29.44 KG/M2 | HEIGHT: 62 IN | WEIGHT: 160 LBS

## 2023-05-01 DIAGNOSIS — F33.2 SEVERE EPISODE OF RECURRENT MAJOR DEPRESSIVE DISORDER, WITHOUT PSYCHOTIC FEATURES (HCC): ICD-10-CM

## 2023-05-01 DIAGNOSIS — G47.9 DIFFICULTY SLEEPING: ICD-10-CM

## 2023-05-01 PROCEDURE — 99214 OFFICE O/P EST MOD 30 MIN: CPT | Mod: 95 | Performed by: PHYSICIAN ASSISTANT

## 2023-05-01 RX ORDER — TRAZODONE HYDROCHLORIDE 50 MG/1
50 TABLET ORAL NIGHTLY
Qty: 30 TABLET | Refills: 0 | Status: SHIPPED | OUTPATIENT
Start: 2023-05-01 | End: 2023-05-24

## 2023-05-01 RX ORDER — BUPROPION HYDROCHLORIDE 150 MG/1
150 TABLET ORAL EVERY MORNING
Qty: 30 TABLET | Refills: 0 | Status: SHIPPED | OUTPATIENT
Start: 2023-05-01 | End: 2023-05-24

## 2023-05-01 RX ORDER — ALBUTEROL SULFATE 90 UG/1
AEROSOL, METERED RESPIRATORY (INHALATION)
COMMUNITY
Start: 2023-03-16

## 2023-05-01 ASSESSMENT — PAIN SCALES - GENERAL: PAINLEVEL: NO PAIN

## 2023-05-01 ASSESSMENT — ANXIETY QUESTIONNAIRES
2. NOT BEING ABLE TO STOP OR CONTROL WORRYING: SEVERAL DAYS
5. BEING SO RESTLESS THAT IT IS HARD TO SIT STILL: SEVERAL DAYS
7. FEELING AFRAID AS IF SOMETHING AWFUL MIGHT HAPPEN: SEVERAL DAYS
1. FEELING NERVOUS, ANXIOUS, OR ON EDGE: SEVERAL DAYS
3. WORRYING TOO MUCH ABOUT DIFFERENT THINGS: SEVERAL DAYS
GAD7 TOTAL SCORE: 11
4. TROUBLE RELAXING: NEARLY EVERY DAY
6. BECOMING EASILY ANNOYED OR IRRITABLE: NEARLY EVERY DAY
IF YOU CHECKED OFF ANY PROBLEMS ON THIS QUESTIONNAIRE, HOW DIFFICULT HAVE THESE PROBLEMS MADE IT FOR YOU TO DO YOUR WORK, TAKE CARE OF THINGS AT HOME, OR GET ALONG WITH OTHER PEOPLE: SOMEWHAT DIFFICULT

## 2023-05-01 ASSESSMENT — PATIENT HEALTH QUESTIONNAIRE - PHQ9
CLINICAL INTERPRETATION OF PHQ2 SCORE: 4
5. POOR APPETITE OR OVEREATING: 0 - NOT AT ALL
SUM OF ALL RESPONSES TO PHQ QUESTIONS 1-9: 18

## 2023-05-01 ASSESSMENT — FIBROSIS 4 INDEX: FIB4 SCORE: 0.49

## 2023-05-02 NOTE — PROGRESS NOTES
"Virtual Visit: Established Patient   This visit was conducted via Zoom using secure and encrypted videoconferencing technology. The patient was in a private location in the [facility].    The patient's identity was confirmed and verbal consent was obtained for this virtual visit.    Subjective:   CC:   Chief Complaint   Patient presents with    Depression       Abby Rosen is a 32 y.o. female presenting for evaluation and management of:    Severe episode of recurrent major depressive disorder, without psychotic features (HCC)  Pt admits to recurrent depression that has been worsening over the past 3 weeks. She worries about her sons health and buying a new house. Feels like she \"has a lot on her plate\". Admits to increased irritability, anger, and hopeless thoughts. She has not been sleeping well, cannot shut off her mind. Has been on zoloft on and off over the past couple years and noted improvement, but had a decreased sex drive. Stopped taking Elavil in December, was on it for migraine prophylaxis and felt that she didn't need it anymore.  Notes worsening of depression since being off of it.    Depression Screening    Little interest or pleasure in doing things?  1 - several days   Feeling down, depressed , or hopeless? 3 - nearly every day   Trouble falling or staying asleep, or sleeping too much?  2 - more than half the days   Feeling tired or having little energy?  3 - nearly every day   Poor appetite or overeating?  0 - not at all   Feeling bad about yourself - or that you are a failure or have let yourself or your family down? 3 - nearly every day   Trouble concentrating on things, such as reading the newspaper or watching television? 3 - nearly every day   Moving or speaking so slowly that other people could have noticed.  Or the opposite - being so fidgety or restless that you have been moving around a lot more than usual?  3 - nearly every day   Thoughts that you would be better off dead, or of " hurting yourself?  0 - not at all   Patient Health Questionnaire Score: 18     DESI-7 Questionnaire    Feeling nervous, anxious, or on edge: Several days  Not being able to sop or control worrying: Several days  Worrying too much about different things: Several days  Trouble relaxing: Nearly every day  Being so restless that it's hard to sit still: Several days  Becoming easily annoyed or irritable: Nearly every day  Feeling afraid as if something awful might happen: Several days  Total: 11    Interpretation of DESI 7 Total Score   Score Severity :  0-4 No Anxiety   5-9 Mild Anxiety  10-14 Moderate Anxiety  15-21 Severe Anxiety          ROS   Denies any recent fevers or chills. No nausea or vomiting. No chest pains or shortness of breath.     No Known Allergies    Current medicines (including changes today)  Current Outpatient Medications   Medication Sig Dispense Refill    traZODone (DESYREL) 50 MG Tab Take 1 Tablet by mouth every evening. 30 Tablet 0    buPROPion (WELLBUTRIN XL) 150 MG XL tablet Take 1 Tablet by mouth every morning. 30 Tablet 0    norethindrone (MICRONOR) 0.35 MG tablet Take 1 Tablet by mouth every day.      ondansetron (ZOFRAN) 4 MG Tab tablet TAKE 1 TABLET BY MOUTH EVERY 4 HOURS AS NEEDED FOR NAUSEA OR VOMITING 20 Tablet 0    cyclobenzaprine (FLEXERIL) 10 mg Tab Take 1 Tablet by mouth 3 times a day as needed for Muscle Spasms or Moderate Pain. 30 Tablet 0    pantoprazole (PROTONIX) 40 MG Tablet Delayed Response pantoprazole 40 mg tablet,delayed release   PLEASE SEE ATTACHED FOR DETAILED DIRECTIONS      ibuprofen (MOTRIN) 800 MG Tab Take 1 Tablet by mouth every 8 hours as needed for Moderate Pain or Inflammation. 30 Tablet 0    diclofenac sodium (VOLTAREN) 1 % Gel APPLY 2 GRAM TOPICALLY TO THE ENTIRE AFFECTED AREAS UP TO 4 TIMES A DAY ONLY AS NEEDED FOR NECK PAIN      SUMAtriptan (IMITREX) 50 MG Tab 1 p.o. when migraine occurs may repeat in 2 hours if it persists no more than 2 tablets in 1 day 9  "Tablet 1    fluticasone (FLONASE) 50 MCG/ACT nasal spray Administer 1 Spray into affected nostril(S) every day.      polyethylene glycol 3350 (MIRALAX) 17 GM/SCOOP Powder Take 17 g by mouth every day. 225 g 3    multivitamin (THERAGRAN) Tab Take 1 Tab by mouth every day.      albuterol 108 (90 Base) MCG/ACT Aero Soln inhalation aerosol USE 1 PUFF EVERY 4 HRS AS NEEDED FOR SHORTNESS OF BREATH AND WHEEZING       No current facility-administered medications for this visit.       Patient Active Problem List    Diagnosis Date Noted    Difficulty sleeping 05/01/2023    Weight gain 10/25/2022    Abnormal menses 10/25/2022    Pharyngitis with viral syndrome 06/30/2022    Chronic migraine without aura without status migrainosus, not intractable 04/05/2022    Benign papule 11/09/2021    Chronic abdominal pain 08/11/2020    Fatigue 07/15/2020    Hip pain, left 05/08/2019    Environmental allergies 05/08/2019    Neutropenia (HCC) 10/03/2017    Migraine without aura and without status migrainosus, not intractable 09/11/2017    Severe episode of recurrent major depressive disorder, without psychotic features (HCC) 09/11/2017    Insomnia disorder 09/11/2017    Encounter for birth control 09/11/2017       Family History   Problem Relation Age of Onset    Hypertension Mother     Cancer Father     Cancer Maternal Aunt         Breast cancer    Cancer Maternal Grandmother         Breast cancer    Arthritis Maternal Grandmother     Cancer Paternal Grandfather         Prostate CA    Lung Disease Maternal Grandfather     Lung Disease Son        She  has a past medical history of Anxiety and depression and Migraine.  She  has a past surgical history that includes primary c section (01/31/2019).         Objective:   Ht 1.575 m (5' 2\")   Wt 72.6 kg (160 lb)   LMP  (LMP Unknown) Comment: Last menses in Oct  BMI 29.26 kg/m²   RR 12    Physical Exam:  Constitutional: Alert, no distress, well-groomed.  Flat affect.  Skin: No rashes in visible " areas.  Eye: Round. Conjunctiva clear, lids normal. No icterus.   ENMT: Lips pink without lesions, good dentition, moist mucous membranes. Phonation normal.  Neck: No masses, no thyromegaly. Moves freely without pain.  Respiratory: Unlabored respiratory effort, no cough or audible wheeze  Psych: Alert and oriented x3, normal affect and mood.     Assessment and Plan:   The following treatment plan was discussed:     1. Severe episode of recurrent major depressive disorder, without psychotic features (HCC)  Acute on chronic, uncontrolled.  We will start bupropion 150 mg daily.  Side effects include headache, nausea, diarrhea.  Previous decreased libido with SSRIs.  If you ever feel like you may hurt yourself or others, or have thoughts about taking your own life, get help right away. To get help:  Call your local emergency services (911 in the [place]  The [facility] and human services helpline (211 in the [place]  Go to your nearest emergency department.  Call a suicide hotline to speak with a trained counselor. The following suicide hotlines are available in the [place]:  6-509-464-TALK (1-753.318.2355).  5-382-AFHPZFA (1-295.824.2598).  - Referral to Psychology  - traZODone (DESYREL) 50 MG Tab; Take 1 Tablet by mouth every evening.  Dispense: 30 Tablet; Refill: 0  - buPROPion (WELLBUTRIN XL) 150 MG XL tablet; Take 1 Tablet by mouth every morning.  Dispense: 30 Tablet; Refill: 0    2. Difficulty sleeping  - Referral to Psychology  - traZODone (DESYREL) 50 MG Tab; Take 1 Tablet by mouth every evening.  Dispense: 30 Tablet; Refill: 0      Follow-up: Return in about 3 weeks (around 5/22/2023) for Medication recheck.         Ashley Domínguez PA-C ([place]aker)  Physician Assistant Certified  [facility]    Please note that this dictation was created using voice recognition software. I have made every reasonable attempt to correct obvious errors, but I expect that there are errors of grammar and possibly  content that I did not discover before finalizing the note.

## 2023-05-02 NOTE — ASSESSMENT & PLAN NOTE
"Pt admits to recurrent depression that has been worsening over the past 3 weeks. She worries about her sons health and buying a new house. Feels like she \"has a lot on her plate\". Admits to increased irritability, anger, and hopeless thoughts. She has not been sleeping well, cannot shut off her mind. Has been on zoloft on and off over the past couple years and noted improvement, but had a decreased sex drive. Stopped taking Elavil in December, was on it for migraine prophylaxis and felt that she didn't need it anymore.  Notes worsening of depression since being off of it.  "

## 2023-05-14 NOTE — PROGRESS NOTES
I have reviewed and agree with history, assessment and plan for office encounter on 5/1/23 with Advanced Practice Provider: Ashley Domínguez PA-C.  Face to face encounter/direct observation: No  Suggested changes to plan or follow-up: none   Mari Bejarano M.D.

## 2023-06-07 NOTE — ASSESSMENT & PLAN NOTE
"She reports \"plugged and itchy\" bilateral ears that began early this week. Reports associated symptoms of an dry, itchy throat, worse when lying down and a mild cough.  Denies fever/chills, N/V, congestion, sputum production or difficulty breathing.   She has tried cough drops and pseudafed which has not seemed to help.   She does report a hx of seasonal allergies in which she usually takes flonase for, but reports that she has not been taking it. Here today for further evaluation.   " Current

## 2023-06-23 ENCOUNTER — TELEMEDICINE (OUTPATIENT)
Dept: MEDICAL GROUP | Facility: IMAGING CENTER | Age: 33
End: 2023-06-23
Payer: COMMERCIAL

## 2023-06-23 VITALS — WEIGHT: 158 LBS | TEMPERATURE: 98.2 F | HEIGHT: 62 IN | BODY MASS INDEX: 29.08 KG/M2

## 2023-06-23 DIAGNOSIS — G89.29 CHRONIC NECK PAIN: ICD-10-CM

## 2023-06-23 DIAGNOSIS — F33.2 SEVERE EPISODE OF RECURRENT MAJOR DEPRESSIVE DISORDER, WITHOUT PSYCHOTIC FEATURES (HCC): ICD-10-CM

## 2023-06-23 DIAGNOSIS — R11.0 NAUSEA: ICD-10-CM

## 2023-06-23 DIAGNOSIS — M54.2 CHRONIC NECK PAIN: ICD-10-CM

## 2023-06-23 PROBLEM — J02.9 PHARYNGITIS WITH VIRAL SYNDROME: Status: RESOLVED | Noted: 2022-06-30 | Resolved: 2023-06-23

## 2023-06-23 PROBLEM — B34.9 PHARYNGITIS WITH VIRAL SYNDROME: Status: RESOLVED | Noted: 2022-06-30 | Resolved: 2023-06-23

## 2023-06-23 PROCEDURE — 99214 OFFICE O/P EST MOD 30 MIN: CPT | Mod: 95 | Performed by: PHYSICIAN ASSISTANT

## 2023-06-23 RX ORDER — ONDANSETRON 4 MG/1
4 TABLET, FILM COATED ORAL EVERY 6 HOURS PRN
Qty: 20 TABLET | Refills: 0 | Status: SHIPPED | OUTPATIENT
Start: 2023-06-23 | End: 2023-09-21

## 2023-06-23 RX ORDER — DULOXETIN HYDROCHLORIDE 60 MG/1
CAPSULE, DELAYED RELEASE ORAL
COMMUNITY
Start: 2023-06-22 | End: 2023-07-05

## 2023-06-23 RX ORDER — CYCLOBENZAPRINE HCL 10 MG
10 TABLET ORAL 3 TIMES DAILY PRN
Qty: 30 TABLET | Refills: 0 | Status: SHIPPED | OUTPATIENT
Start: 2023-06-23 | End: 2024-01-11 | Stop reason: SDUPTHER

## 2023-06-23 ASSESSMENT — FIBROSIS 4 INDEX: FIB4 SCORE: 0.49

## 2023-06-23 NOTE — PROGRESS NOTES
Virtual Visit: Established Patient   This visit was conducted via Zoom using secure and encrypted videoconferencing technology. The patient was in a private location in the state of [place].    The patient's identity was confirmed and verbal consent was obtained for this virtual visit.    Subjective:   CC:   Chief Complaint   Patient presents with    Medication Management     Pt states she stopped taking the wellbutrin. Brain fog and fatigue. Now seeing psychiatry.       Abby Rosen is a 32 y.o. female presenting for evaluation and management of:    Severe episode of recurrent major depressive disorder, without psychotic features (HCC)  Chronic, uncontrolled. Could not tolerate Wellbutrin due to increased crying and emotional lability. Established with psychiatry and was prescribed Cymbalta. Has not started yet. Has been using trazodone as needed to sleep.     Chronic neck pain  Pt admits to chronic neck pain. Has done PT in the past, as well as an epidural injection. Uses flexeril once every 1-2 weeks when her muscles feel tight. Tries to do stretches to help release tension.    Patient requesting refill of Zofran.  States that she has been having episodes of dizziness and nausea since trialing Wellbutrin.  Stopped taking it around 2 weeks ago.  She is not sure if it is related.  No recent sinus infections or allergies.    ROS   Denies any recent fevers or chills. No vomiting. No chest pains or shortness of breath.     No Known Allergies    Current medicines (including changes today)  Current Outpatient Medications   Medication Sig Dispense Refill    ondansetron (ZOFRAN) 4 MG Tab tablet Take 1 Tablet by mouth every 6 hours as needed for Nausea/Vomiting for up to 90 days. 20 Tablet 0    cyclobenzaprine (FLEXERIL) 10 mg Tab Take 1 Tablet by mouth 3 times a day as needed for Muscle Spasms or Moderate Pain. 30 Tablet 0    traZODone (DESYREL) 50 MG Tab TAKE 1 TABLET BY MOUTH EVERY DAY IN THE EVENING 90 Tablet 1     albuterol 108 (90 Base) MCG/ACT Aero Soln inhalation aerosol USE 1 PUFF EVERY 4 HRS AS NEEDED FOR SHORTNESS OF BREATH AND WHEEZING      norethindrone (MICRONOR) 0.35 MG tablet Take 1 Tablet by mouth every day.      pantoprazole (PROTONIX) 40 MG Tablet Delayed Response pantoprazole 40 mg tablet,delayed release   PLEASE SEE ATTACHED FOR DETAILED DIRECTIONS      ibuprofen (MOTRIN) 800 MG Tab Take 1 Tablet by mouth every 8 hours as needed for Moderate Pain or Inflammation. 30 Tablet 0    diclofenac sodium (VOLTAREN) 1 % Gel APPLY 2 GRAM TOPICALLY TO THE ENTIRE AFFECTED AREAS UP TO 4 TIMES A DAY ONLY AS NEEDED FOR NECK PAIN      SUMAtriptan (IMITREX) 50 MG Tab 1 p.o. when migraine occurs may repeat in 2 hours if it persists no more than 2 tablets in 1 day 9 Tablet 1    fluticasone (FLONASE) 50 MCG/ACT nasal spray Administer 1 Spray into affected nostril(S) every day.      multivitamin (THERAGRAN) Tab Take 1 Tab by mouth every day.      DULoxetine (CYMBALTA) 60 MG Cap DR Particles delayed-release capsule        No current facility-administered medications for this visit.       Patient Active Problem List    Diagnosis Date Noted    Chronic neck pain 06/23/2023    Difficulty sleeping 05/01/2023    Weight gain 10/25/2022    Abnormal menses 10/25/2022    Chronic migraine without aura without status migrainosus, not intractable 04/05/2022    Benign papule 11/09/2021    Chronic abdominal pain 08/11/2020    Fatigue 07/15/2020    Hip pain, left 05/08/2019    Environmental allergies 05/08/2019    Neutropenia (HCC) 10/03/2017    Migraine without aura and without status migrainosus, not intractable 09/11/2017    Severe episode of recurrent major depressive disorder, without psychotic features (HCC) 09/11/2017    Insomnia disorder 09/11/2017    Encounter for birth control 09/11/2017       Family History   Problem Relation Age of Onset    Hypertension Mother     Cancer Father     Cancer Maternal Aunt         Breast cancer    Cancer  "Maternal Grandmother         Breast cancer    Arthritis Maternal Grandmother     Cancer Paternal Grandfather         Prostate CA    Lung Disease Maternal Grandfather     Lung Disease Son        She  has a past medical history of Anxiety and depression and Migraine.  She  has a past surgical history that includes primary c section (01/31/2019).         Objective:   Temp 36.8 °C (98.2 °F) (Temporal)   Ht 1.575 m (5' 2\") Comment: Pt reported  Wt 71.7 kg (158 lb) Comment: Pt reported  LMP 10/10/2022 (Approximate)   BMI 28.90 kg/m²   RR 12    Physical Exam:  Constitutional: Alert, no distress, well-groomed.  Skin: No rashes in visible areas.  Eye: Round. Conjunctiva clear, lids normal. No icterus.   ENMT: Lips pink without lesions, good dentition, moist mucous membranes. Phonation normal.  Neck: No masses, no thyromegaly. Moves freely without pain.  Respiratory: Unlabored respiratory effort, no cough or audible wheeze  Psych: Alert and oriented x3, normal affect and mood.     Assessment and Plan:   The following treatment plan was discussed:     1. Severe episode of recurrent major depressive disorder, without psychotic features (HCC)  Chronic, uncontrolled.  Now managed by psychiatry.  Medication list updated with new Cymbalta prescription from her psychiatrist.  Follow-up as scheduled.  - DULoxetine (CYMBALTA) 60 MG Cap DR Particles delayed-release capsule    2. Chronic neck pain  Chronic, uses Flexeril as needed.  Would recommend working on posture, as well as frequent stretching.  Do not operate heavy machinery while taking Flexeril as it can cause drowsiness.  - cyclobenzaprine (FLEXERIL) 10 mg Tab; Take 1 Tablet by mouth 3 times a day as needed for Muscle Spasms or Moderate Pain.  Dispense: 30 Tablet; Refill: 0    3. Nausea  With intermittent episodes of dizziness, instructed patient to schedule an appointment office for evaluation or go to emergency department if symptoms are severe.  Will refill Zofran.  ER " if vomiting, dehydration, melena, abdominal pain, fever, headache, vision changes.  - ondansetron (ZOFRAN) 4 MG Tab tablet; Take 1 Tablet by mouth every 6 hours as needed for Nausea/Vomiting for up to 90 days.  Dispense: 20 Tablet; Refill: 0      Follow-up: Return in about 2 weeks (around 7/7/2023) for Follow-up.         Ashley Domínguez PA-C ([place]aker)  Physician Assistant Certified  [facility]    Please note that this dictation was created using voice recognition software. I have made every reasonable attempt to correct obvious errors, but I expect that there are errors of grammar and possibly content that I did not discover before finalizing the note.

## 2023-06-23 NOTE — ASSESSMENT & PLAN NOTE
Chronic, uncontrolled. Could not tolerate Wellbutrin due to increased crying and emotional lability. Established with psychiatry and was prescribed Cymbalta. Has not started yet. Has been using trazodone as needed to sleep.

## 2023-06-23 NOTE — PATIENT INSTRUCTIONS
It was a pleasure meeting with you today at [facility]!    Your medical history/records and medications were reviewed today.     UPDATE on MyChart Results: If you have blood work, and/or imaging studies, or any other test or procedure completed, you will have access to results as soon as they become available in MyChart. Recently, these results will be available for review at the same time that your provider is able to see results!    This will likely mean you will see a result before your provider has had a chance to review and discuss with you.  Some results or care notes may be hard to understand and may be serious in nature.    We look at every result and your provider will contact you to explain what they mean and discuss appropriate next steps. Please allow for at least 72 business hours for chart and result review.     We prefer that you wait for your care team to contact you with your results.  Often, your provider will discuss your results with you at your next appointment. We look forward to continuing to partner with you in your care.    Please review my practice information below:    If you have any prescription refill requests, please send them via [employer] or discuss with your provider at the start of your office visits. Please allow 3-5 business days for lab and testing review and you will be contacted via [employer] with those results, or if advised to make a follow up appointment regarding those results, then please do so.     Once resulted, your lab/test/imaging results will show up automatically in your MyChart. Please wait for my interpretation and recommendations prior to viewing your results to avoid any unnecessary confusion or misinterpretation. I will address all of the lab values that I interpret as abnormal and message you accordingly on your MyChart. I will always send you a message about your results even if they are normal. If you do not hear back from me within 5-7 business  days after completing your tests, then please send me a message on [employer] so I can obtain your results (especially if you went to an outside lab or imaging center - [facility], [facility], etc).     If you have any additional questions or concerns beyond my interpretation of your results, please make an appointment with me to discuss in further detail.    Please only use the [employer] messaging system for questions regarding your most recent appointment or if advised to use otherwise (glucose or blood pressure reporting).     If you have any new problems or concerns, you must make an appointment to discuss. This includes any referral requests, lab requests (unless advised to notify me for pre-appt labs), medication side effects, or request for medication adjustments.     Please arrive 15 minutes prior to your appointment time to complete your check-in and intake with the medical assistant.      Thank you,    Ashley Domínguez PA-C ([place]aker)  Physician Assistant Certified  [facility]    -----------------------------------------------------------------    Attn: Patients of [facility]:    In an effort to continue to provide excellent and efficient care to our patients, it is vital that we continue to use our resources appropriately. With that, this is a reminder that [employer] is used for prescription refill requests, test results, virtual visits, and chart review only.     Any new questions, concerns/conditions, lab/imaging requests, medication adjustments, new prescriptions, or referral requests do require an appointment (virtually or in person), unless discussed otherwise at your most recent appointment.     Thank you for your understanding,    [facility]

## 2023-06-23 NOTE — ASSESSMENT & PLAN NOTE
Pt admits to chronic neck pain. Has done PT in the past, as well as an epidural injection. Uses flexeril once every 1-2 weeks when her muscles feel tight. Tries to do stretches to help release tension.

## 2023-07-05 ENCOUNTER — GYNECOLOGY VISIT (OUTPATIENT)
Dept: OBGYN | Facility: CLINIC | Age: 33
End: 2023-07-05
Payer: COMMERCIAL

## 2023-07-05 VITALS — WEIGHT: 158 LBS | BODY MASS INDEX: 28.9 KG/M2 | DIASTOLIC BLOOD PRESSURE: 73 MMHG | SYSTOLIC BLOOD PRESSURE: 130 MMHG

## 2023-07-05 DIAGNOSIS — Z30.09 FAMILY PLANNING: ICD-10-CM

## 2023-07-05 DIAGNOSIS — N92.1 BREAKTHROUGH BLEEDING ON BIRTH CONTROL PILLS: ICD-10-CM

## 2023-07-05 PROCEDURE — 3078F DIAST BP <80 MM HG: CPT | Performed by: OBSTETRICS & GYNECOLOGY

## 2023-07-05 PROCEDURE — 99213 OFFICE O/P EST LOW 20 MIN: CPT | Performed by: OBSTETRICS & GYNECOLOGY

## 2023-07-05 PROCEDURE — 3075F SYST BP GE 130 - 139MM HG: CPT | Performed by: OBSTETRICS & GYNECOLOGY

## 2023-07-05 RX ORDER — SWAB
1 SWAB, NON-MEDICATED MISCELLANEOUS DAILY
Qty: 30 TABLET | Refills: 11 | Status: SHIPPED | OUTPATIENT
Start: 2023-07-05

## 2023-07-05 ASSESSMENT — FIBROSIS 4 INDEX: FIB4 SCORE: 0.49

## 2023-07-05 NOTE — PROGRESS NOTES
GYN FOLLOW UP VISIT    CC:  No chief complaint on file.       HPI: Patient is a 32 y.o.  who presents for  follow up of birth control use. At her last visit with us, we switched her from estrogen containing Nuva Ring to progesterone only minipill due to bothersome side effects of weight gain, breast tenderness and mood changes. Reports these side effect have resolved after switching to progesterone only pills. She does state she has few days of breakthrough spotting each month. She also does occasionally forget to take the pill.   She and her  are planning to try for pregnancy in the next month or two and asking when she should stop her birth control.   She also has follow up with her psychiatrist soon for her depression. Reports she has seen GI specialist in the past for issues and she does state she has stomach cramping and loose stools at times.        ROS:   General: denies fever / chills  HEENT: denies sore throat:  CV: denies chest pain:  Repiratory: denies shortness of breath  GI: denies abdominal pain  : denies dysuria:    PFSH:  I personally reviewed the past medical and surgical histories.       PHYSICAL EXAMINATION:  Vital Signs:   Vitals:    23 0900   BP: 130/73   BP Location: Right arm   Patient Position: Sitting   BP Cuff Size: Adult   Weight: 158 lb     Body mass index is 28.9 kg/m².    Gen: appears well, NAD  Exam deferred    ASSESSMENT AND PLAN:  32 y.o.  with breakthrough bleeding on birth control pills.   Family planning discussed.     Advised she may stop her contraceptive pills anytime she is ready to become pregnant. She had no delay in getting pregnant with her first baby. Advised she should follow up with her psychiatrist and GI specialists to address her non-obgyn issues and if needed, be on medications that would be compatible with pregnancy. Also advised she should start daily prenatal vitamin with folic acid, sent Rx to her pharmacy.     RTC in 1 year or PRN.      1. Family planning        2. Breakthrough bleeding on birth control pills          Orders Placed This Encounter    prenatal vitamin with Fe/FA (NICLOE PRENATAL) 28-0.8 MG Tab         Time spent: 30 minutes    Gabbie Wong M.D.

## 2023-09-05 ENCOUNTER — PATIENT MESSAGE (OUTPATIENT)
Dept: OBGYN | Facility: CLINIC | Age: 33
End: 2023-09-05
Payer: MEDICAID

## 2023-09-05 DIAGNOSIS — N92.1 BREAKTHROUGH BLEEDING ON BIRTH CONTROL PILLS: ICD-10-CM

## 2023-09-05 DIAGNOSIS — Z30.41 ENCOUNTER FOR SURVEILLANCE OF CONTRACEPTIVE PILLS: ICD-10-CM

## 2023-09-06 RX ORDER — ACETAMINOPHEN AND CODEINE PHOSPHATE 120; 12 MG/5ML; MG/5ML
1 SOLUTION ORAL DAILY
Qty: 28 TABLET | Refills: 11 | Status: SHIPPED | OUTPATIENT
Start: 2023-09-06

## 2023-09-11 ENCOUNTER — APPOINTMENT (OUTPATIENT)
Dept: RADIOLOGY | Facility: MEDICAL CENTER | Age: 33
End: 2023-09-11
Attending: NURSE PRACTITIONER
Payer: MEDICAID